# Patient Record
Sex: FEMALE | Race: OTHER | Employment: UNEMPLOYED | URBAN - METROPOLITAN AREA
[De-identification: names, ages, dates, MRNs, and addresses within clinical notes are randomized per-mention and may not be internally consistent; named-entity substitution may affect disease eponyms.]

---

## 2018-02-27 ENCOUNTER — HOSPITAL ENCOUNTER (EMERGENCY)
Age: 83
Discharge: OTHER INSTITUTION | End: 2018-02-27
Attending: EMERGENCY MEDICINE
Payer: MEDICARE

## 2018-02-27 ENCOUNTER — APPOINTMENT (OUTPATIENT)
Dept: GENERAL RADIOLOGY | Age: 83
End: 2018-02-27
Payer: MEDICARE

## 2018-02-27 ENCOUNTER — APPOINTMENT (OUTPATIENT)
Dept: CT IMAGING | Age: 83
End: 2018-02-27
Payer: MEDICARE

## 2018-02-27 VITALS
OXYGEN SATURATION: 96 % | HEART RATE: 107 BPM | DIASTOLIC BLOOD PRESSURE: 54 MMHG | RESPIRATION RATE: 18 BRPM | TEMPERATURE: 98 F | BODY MASS INDEX: 30.04 KG/M2 | SYSTOLIC BLOOD PRESSURE: 113 MMHG | WEIGHT: 159 LBS

## 2018-02-27 DIAGNOSIS — F41.1 ANXIETY STATE: Primary | ICD-10-CM

## 2018-02-27 DIAGNOSIS — F39 MOOD DISORDER (HCC): ICD-10-CM

## 2018-02-27 DIAGNOSIS — F99 PSYCHIATRIC DISORDER: ICD-10-CM

## 2018-02-27 LAB
ALBUMIN SERPL-MCNC: 4 G/DL (ref 3.9–4.9)
ALP BLD-CCNC: 71 U/L (ref 40–130)
ALT SERPL-CCNC: 20 U/L (ref 0–33)
AMPHETAMINE SCREEN, URINE: NORMAL
ANION GAP SERPL CALCULATED.3IONS-SCNC: 12 MEQ/L (ref 7–13)
APTT: 33.8 SEC (ref 21.6–35.4)
AST SERPL-CCNC: 37 U/L (ref 0–35)
BARBITURATE SCREEN URINE: NORMAL
BASOPHILS ABSOLUTE: 0.1 K/UL (ref 0–0.2)
BASOPHILS RELATIVE PERCENT: 1 %
BENZODIAZEPINE SCREEN, URINE: NORMAL
BILIRUB SERPL-MCNC: 0.5 MG/DL (ref 0–1.2)
BILIRUBIN URINE: ABNORMAL
BLOOD, URINE: ABNORMAL
BUN BLDV-MCNC: 19 MG/DL (ref 8–23)
CALCIUM SERPL-MCNC: 9.1 MG/DL (ref 8.6–10.2)
CANNABINOID SCREEN URINE: NORMAL
CHLORIDE BLD-SCNC: 101 MEQ/L (ref 98–107)
CLARITY: ABNORMAL
CO2: 27 MEQ/L (ref 22–29)
COCAINE METABOLITE SCREEN URINE: NORMAL
COLOR: ABNORMAL
CREAT SERPL-MCNC: 1 MG/DL (ref 0.5–0.9)
CRYSTALS, UA: ABNORMAL
EKG ATRIAL RATE: 133 BPM
EKG Q-T INTERVAL: 368 MS
EKG QRS DURATION: 100 MS
EKG QTC CALCULATION (BAZETT): 440 MS
EKG R AXIS: 6 DEGREES
EKG T AXIS: 97 DEGREES
EKG VENTRICULAR RATE: 86 BPM
EOSINOPHILS ABSOLUTE: 0.1 K/UL (ref 0–0.7)
EOSINOPHILS RELATIVE PERCENT: 1.3 %
EPITHELIAL CELLS, UA: ABNORMAL /HPF
ETHANOL PERCENT: NORMAL G/DL
ETHANOL: <10 MG/DL (ref 0–0.08)
GFR AFRICAN AMERICAN: >60
GFR NON-AFRICAN AMERICAN: 52.8
GLOBULIN: 2.5 G/DL (ref 2.3–3.5)
GLUCOSE BLD-MCNC: 178 MG/DL (ref 74–109)
GLUCOSE URINE: NEGATIVE MG/DL
HCT VFR BLD CALC: 42.1 % (ref 37–47)
HEMOGLOBIN: 13.7 G/DL (ref 12–16)
INR BLD: 2.4
KETONES, URINE: NEGATIVE MG/DL
LEUKOCYTE ESTERASE, URINE: ABNORMAL
LYMPHOCYTES ABSOLUTE: 0.8 K/UL (ref 1–4.8)
LYMPHOCYTES RELATIVE PERCENT: 12.3 %
Lab: NORMAL
MAGNESIUM: 2.2 MG/DL (ref 1.7–2.3)
MCH RBC QN AUTO: 29.1 PG (ref 27–31.3)
MCHC RBC AUTO-ENTMCNC: 32.6 % (ref 33–37)
MCV RBC AUTO: 89.2 FL (ref 82–100)
MONOCYTES ABSOLUTE: 0.5 K/UL (ref 0.2–0.8)
MONOCYTES RELATIVE PERCENT: 7.9 %
NEUTROPHILS ABSOLUTE: 5 K/UL (ref 1.4–6.5)
NEUTROPHILS RELATIVE PERCENT: 77.5 %
NITRITE, URINE: NEGATIVE
OPIATE SCREEN URINE: NORMAL
PDW BLD-RTO: 13.3 % (ref 11.5–14.5)
PH UA: 5.5 (ref 5–9)
PHENCYCLIDINE SCREEN URINE: NORMAL
PLATELET # BLD: 161 K/UL (ref 130–400)
POTASSIUM SERPL-SCNC: 4.6 MEQ/L (ref 3.5–5.1)
PROTEIN UA: ABNORMAL MG/DL
PROTHROMBIN TIME: 25.5 SEC (ref 8.1–13.7)
RBC # BLD: 4.72 M/UL (ref 4.2–5.4)
RBC UA: ABNORMAL /HPF (ref 0–2)
RENAL EPITHELIAL, UA: ABNORMAL /HPF
SODIUM BLD-SCNC: 140 MEQ/L (ref 132–144)
SPECIFIC GRAVITY UA: 1.02 (ref 1–1.03)
TOTAL PROTEIN: 6.5 G/DL (ref 6.4–8.1)
TROPONIN: <0.01 NG/ML (ref 0–0.01)
UROBILINOGEN, URINE: 0.2 E.U./DL
WBC # BLD: 6.5 K/UL (ref 4.8–10.8)
WBC UA: ABNORMAL /HPF (ref 0–5)

## 2018-02-27 PROCEDURE — 84484 ASSAY OF TROPONIN QUANT: CPT

## 2018-02-27 PROCEDURE — 80307 DRUG TEST PRSMV CHEM ANLYZR: CPT

## 2018-02-27 PROCEDURE — 6360000002 HC RX W HCPCS: Performed by: EMERGENCY MEDICINE

## 2018-02-27 PROCEDURE — G0480 DRUG TEST DEF 1-7 CLASSES: HCPCS

## 2018-02-27 PROCEDURE — 96374 THER/PROPH/DIAG INJ IV PUSH: CPT

## 2018-02-27 PROCEDURE — 85610 PROTHROMBIN TIME: CPT

## 2018-02-27 PROCEDURE — 83735 ASSAY OF MAGNESIUM: CPT

## 2018-02-27 PROCEDURE — 93010 ELECTROCARDIOGRAM REPORT: CPT | Performed by: INTERNAL MEDICINE

## 2018-02-27 PROCEDURE — 80053 COMPREHEN METABOLIC PANEL: CPT

## 2018-02-27 PROCEDURE — 85025 COMPLETE CBC W/AUTO DIFF WBC: CPT

## 2018-02-27 PROCEDURE — 93005 ELECTROCARDIOGRAM TRACING: CPT

## 2018-02-27 PROCEDURE — 72125 CT NECK SPINE W/O DYE: CPT

## 2018-02-27 PROCEDURE — 72110 X-RAY EXAM L-2 SPINE 4/>VWS: CPT

## 2018-02-27 PROCEDURE — 81001 URINALYSIS AUTO W/SCOPE: CPT

## 2018-02-27 PROCEDURE — 99285 EMERGENCY DEPT VISIT HI MDM: CPT

## 2018-02-27 PROCEDURE — 70450 CT HEAD/BRAIN W/O DYE: CPT

## 2018-02-27 PROCEDURE — 85730 THROMBOPLASTIN TIME PARTIAL: CPT

## 2018-02-27 PROCEDURE — 2580000003 HC RX 258: Performed by: EMERGENCY MEDICINE

## 2018-02-27 PROCEDURE — 36415 COLL VENOUS BLD VENIPUNCTURE: CPT

## 2018-02-27 RX ORDER — LORAZEPAM 2 MG/ML
1 INJECTION INTRAMUSCULAR ONCE
Status: COMPLETED | OUTPATIENT
Start: 2018-02-27 | End: 2018-02-27

## 2018-02-27 RX ORDER — 0.9 % SODIUM CHLORIDE 0.9 %
1000 INTRAVENOUS SOLUTION INTRAVENOUS ONCE
Status: COMPLETED | OUTPATIENT
Start: 2018-02-27 | End: 2018-02-27

## 2018-02-27 RX ADMIN — SODIUM CHLORIDE 1000 ML: 9 INJECTION, SOLUTION INTRAVENOUS at 08:21

## 2018-02-27 RX ADMIN — LORAZEPAM 1 MG: 2 INJECTION INTRAMUSCULAR; INTRAVENOUS at 14:30

## 2018-02-27 ASSESSMENT — ENCOUNTER SYMPTOMS
BACK PAIN: 1
ABDOMINAL PAIN: 0
COUGH: 0
VOMITING: 0
SORE THROAT: 0
DIARRHEA: 0
NAUSEA: 0
SHORTNESS OF BREATH: 0

## 2018-02-27 ASSESSMENT — PAIN SCALES - GENERAL: PAINLEVEL_OUTOF10: 6

## 2018-02-27 ASSESSMENT — PAIN DESCRIPTION - PAIN TYPE: TYPE: ACUTE PAIN

## 2018-02-27 ASSESSMENT — PAIN DESCRIPTION - DESCRIPTORS: DESCRIPTORS: ACHING

## 2018-02-27 ASSESSMENT — PAIN DESCRIPTION - FREQUENCY: FREQUENCY: CONTINUOUS

## 2018-02-27 ASSESSMENT — PAIN DESCRIPTION - LOCATION: LOCATION: ABDOMEN

## 2018-02-27 ASSESSMENT — PAIN DESCRIPTION - ORIENTATION: ORIENTATION: UPPER

## 2018-02-27 NOTE — ED PROVIDER NOTES
(AUGMENTIN) 875-125 MG PER TABLET    TK 1 T PO BID    CEPHALEXIN (KEFLEX) 500 MG CAPSULE    TK ONE C PO   BID FOR 7 DAYS    CHOLECALCIFEROL (VITAMIN D) 2000 UNITS CAPS CAPSULE    Take 1 capsule by mouth daily    CLOBETASOL (TEMOVATE) 0.05 % OINTMENT    Apply prn. CLOTRIMAZOLE-BETAMETHASONE (LOTRISONE) 1-0.05 % CREAM    Apply topically 2 times daily. DONEPEZIL (ARICEPT) 5 MG TABLET    Take 1 tablet by mouth nightly    DULOXETINE (CYMBALTA) 30 MG EXTENDED RELEASE CAPSULE    Take 30 mg by mouth 2 times daily Indications: Duloxetine DR    ESCITALOPRAM (LEXAPRO) 20 MG TABLET    TAKE 1 TABLET BY MOUTH DAILY    FAMOTIDINE (PEPCID) 20 MG TABLET    TAKE 1 TABLET BY MOUTH TWICE DAILY    LIDOCAINE (LIDODERM) 5 %    Place 1 patch onto the skin daily 12 hours on, 12 hours off. METOPROLOL (TOPROL-XL) 50 MG XL TABLET    Take 1 1/2 tabs daily    NUEDEXTA 20-10 MG CAPS        PRIMIDONE (MYSOLINE) 50 MG TABLET    Take 3 tablets by mouth daily. SULFAMETHOXAZOLE-TRIMETHOPRIM (BACTRIM DS;SEPTRA DS) 800-160 MG PER TABLET    TK 1 T PO  BID    TIZANIDINE (ZANAFLEX) 4 MG TABLET    Take 0.5-1 tablets by mouth nightly    WARFARIN (COUMADIN) 6 MG TABLET    TAKE AS DIRECTED TO MAINTAIN INR       ALLERGIES     Codeine    FAMILY HISTORY     History reviewed. No pertinent family history. SOCIAL HISTORY       Social History     Social History    Marital status:      Spouse name: N/A    Number of children: N/A    Years of education: N/A     Social History Main Topics    Smoking status: Never Smoker    Smokeless tobacco: None    Alcohol use No    Drug use: No    Sexual activity: Not Asked     Other Topics Concern    None     Social History Narrative    None         PHYSICAL EXAM       ED Triage Vitals [02/27/18 0707]   BP Temp Temp Source Pulse Resp SpO2 Height Weight   (!) 138/91 97.2 °F (36.2 °C) Oral 94 21 97 % -- 159 lb (72.1 kg)       Physical Exam   Constitutional: She is oriented to person, place, and time.
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
## 2018-04-02 ENCOUNTER — HOSPITAL ENCOUNTER (INPATIENT)
Age: 83
LOS: 5 days | Discharge: HOME | DRG: 193 | End: 2018-04-08
Attending: EMERGENCY MEDICINE | Admitting: HOSPITALIST
Payer: MEDICARE

## 2018-04-02 ENCOUNTER — APPOINTMENT (OUTPATIENT)
Dept: GENERAL RADIOLOGY | Age: 83
DRG: 193 | End: 2018-04-02
Payer: MEDICARE

## 2018-04-02 DIAGNOSIS — J98.01 BRONCHOSPASM: ICD-10-CM

## 2018-04-02 DIAGNOSIS — J10.1 INFLUENZA B: Primary | ICD-10-CM

## 2018-04-02 DIAGNOSIS — E87.20 LACTIC ACIDOSIS: ICD-10-CM

## 2018-04-02 DIAGNOSIS — R77.8 ELEVATED TROPONIN: ICD-10-CM

## 2018-04-02 LAB
ALBUMIN SERPL-MCNC: 3.2 G/DL (ref 3.9–4.9)
ALP BLD-CCNC: 52 U/L (ref 40–130)
ALT SERPL-CCNC: 7 U/L (ref 0–33)
ANION GAP SERPL CALCULATED.3IONS-SCNC: 13 MEQ/L (ref 7–13)
AST SERPL-CCNC: 27 U/L (ref 0–35)
BASOPHILS ABSOLUTE: 0 K/UL (ref 0–0.2)
BASOPHILS RELATIVE PERCENT: 0.6 %
BILIRUB SERPL-MCNC: 0.4 MG/DL (ref 0–1.2)
BUN BLDV-MCNC: 27 MG/DL (ref 8–23)
CALCIUM SERPL-MCNC: 8.2 MG/DL (ref 8.6–10.2)
CHLORIDE BLD-SCNC: 100 MEQ/L (ref 98–107)
CO2: 28 MEQ/L (ref 22–29)
CREAT SERPL-MCNC: 0.93 MG/DL (ref 0.5–0.9)
EOSINOPHILS ABSOLUTE: 0 K/UL (ref 0–0.7)
EOSINOPHILS RELATIVE PERCENT: 0 %
GFR AFRICAN AMERICAN: >60
GFR NON-AFRICAN AMERICAN: 57.4
GLOBULIN: 2.1 G/DL (ref 2.3–3.5)
GLUCOSE BLD-MCNC: 187 MG/DL (ref 74–109)
HCT VFR BLD CALC: 41 % (ref 37–47)
HEMOGLOBIN: 13.4 G/DL (ref 12–16)
INR BLD: 2.5
LACTIC ACID: 3.1 MMOL/L (ref 0.5–2.2)
LYMPHOCYTES ABSOLUTE: 1.1 K/UL (ref 1–4.8)
LYMPHOCYTES RELATIVE PERCENT: 14 %
MCH RBC QN AUTO: 29 PG (ref 27–31.3)
MCHC RBC AUTO-ENTMCNC: 32.7 % (ref 33–37)
MCV RBC AUTO: 88.8 FL (ref 82–100)
MONOCYTES ABSOLUTE: 1 K/UL (ref 0.2–0.8)
MONOCYTES RELATIVE PERCENT: 12.9 %
NEUTROPHILS ABSOLUTE: 5.7 K/UL (ref 1.4–6.5)
NEUTROPHILS RELATIVE PERCENT: 72.5 %
PDW BLD-RTO: 14.6 % (ref 11.5–14.5)
PLATELET # BLD: 79 K/UL (ref 130–400)
PLATELET SLIDE REVIEW: ABNORMAL
POTASSIUM SERPL-SCNC: 3.5 MEQ/L (ref 3.5–5.1)
PRO-BNP: 3576 PG/ML
PROTHROMBIN TIME: 26.2 SEC (ref 8.1–13.7)
RAPID INFLUENZA  B AGN: POSITIVE
RAPID INFLUENZA A AGN: NEGATIVE
RBC # BLD: 4.62 M/UL (ref 4.2–5.4)
SLIDE REVIEW: ABNORMAL
SODIUM BLD-SCNC: 141 MEQ/L (ref 132–144)
TOTAL PROTEIN: 5.3 G/DL (ref 6.4–8.1)
TROPONIN: 0.04 NG/ML (ref 0–0.01)
WBC # BLD: 7.9 K/UL (ref 4.8–10.8)

## 2018-04-02 PROCEDURE — 85025 COMPLETE CBC W/AUTO DIFF WBC: CPT

## 2018-04-02 PROCEDURE — 6370000000 HC RX 637 (ALT 250 FOR IP): Performed by: EMERGENCY MEDICINE

## 2018-04-02 PROCEDURE — 93005 ELECTROCARDIOGRAM TRACING: CPT

## 2018-04-02 PROCEDURE — 71046 X-RAY EXAM CHEST 2 VIEWS: CPT

## 2018-04-02 PROCEDURE — G0378 HOSPITAL OBSERVATION PER HR: HCPCS

## 2018-04-02 PROCEDURE — 83880 ASSAY OF NATRIURETIC PEPTIDE: CPT

## 2018-04-02 PROCEDURE — 94640 AIRWAY INHALATION TREATMENT: CPT

## 2018-04-02 PROCEDURE — 85610 PROTHROMBIN TIME: CPT

## 2018-04-02 PROCEDURE — 96374 THER/PROPH/DIAG INJ IV PUSH: CPT

## 2018-04-02 PROCEDURE — 80053 COMPREHEN METABOLIC PANEL: CPT

## 2018-04-02 PROCEDURE — 99285 EMERGENCY DEPT VISIT HI MDM: CPT

## 2018-04-02 PROCEDURE — 84484 ASSAY OF TROPONIN QUANT: CPT

## 2018-04-02 PROCEDURE — 6360000002 HC RX W HCPCS: Performed by: EMERGENCY MEDICINE

## 2018-04-02 PROCEDURE — 2580000003 HC RX 258: Performed by: EMERGENCY MEDICINE

## 2018-04-02 PROCEDURE — 6370000000 HC RX 637 (ALT 250 FOR IP): Performed by: INTERNAL MEDICINE

## 2018-04-02 PROCEDURE — 36415 COLL VENOUS BLD VENIPUNCTURE: CPT

## 2018-04-02 PROCEDURE — 86403 PARTICLE AGGLUT ANTBDY SCRN: CPT

## 2018-04-02 PROCEDURE — 87040 BLOOD CULTURE FOR BACTERIA: CPT

## 2018-04-02 PROCEDURE — 83605 ASSAY OF LACTIC ACID: CPT

## 2018-04-02 RX ORDER — OSELTAMIVIR PHOSPHATE 75 MG/1
75 CAPSULE ORAL 2 TIMES DAILY
Status: DISCONTINUED | OUTPATIENT
Start: 2018-04-02 | End: 2018-04-07

## 2018-04-02 RX ORDER — BENZONATATE 100 MG/1
100 CAPSULE ORAL 3 TIMES DAILY PRN
Status: DISCONTINUED | OUTPATIENT
Start: 2018-04-02 | End: 2018-04-08 | Stop reason: HOSPADM

## 2018-04-02 RX ORDER — METHYLPREDNISOLONE SODIUM SUCCINATE 40 MG/ML
20 INJECTION, POWDER, LYOPHILIZED, FOR SOLUTION INTRAMUSCULAR; INTRAVENOUS EVERY 8 HOURS
Status: DISCONTINUED | OUTPATIENT
Start: 2018-04-02 | End: 2018-04-02

## 2018-04-02 RX ORDER — LIDOCAINE 50 MG/G
1 PATCH TOPICAL DAILY
Status: DISCONTINUED | OUTPATIENT
Start: 2018-04-02 | End: 2018-04-08 | Stop reason: HOSPADM

## 2018-04-02 RX ORDER — ONDANSETRON 2 MG/ML
4 INJECTION INTRAMUSCULAR; INTRAVENOUS EVERY 6 HOURS PRN
Status: DISCONTINUED | OUTPATIENT
Start: 2018-04-02 | End: 2018-04-08 | Stop reason: HOSPADM

## 2018-04-02 RX ORDER — SODIUM CHLORIDE 9 MG/ML
INJECTION, SOLUTION INTRAVENOUS CONTINUOUS
Status: DISCONTINUED | OUTPATIENT
Start: 2018-04-02 | End: 2018-04-03

## 2018-04-02 RX ORDER — 0.9 % SODIUM CHLORIDE 0.9 %
500 INTRAVENOUS SOLUTION INTRAVENOUS ONCE
Status: COMPLETED | OUTPATIENT
Start: 2018-04-02 | End: 2018-04-02

## 2018-04-02 RX ORDER — BUSPIRONE HYDROCHLORIDE 10 MG/1
10 TABLET ORAL 3 TIMES DAILY
COMMUNITY
End: 2018-09-09

## 2018-04-02 RX ORDER — ESCITALOPRAM OXALATE 20 MG/1
20 TABLET ORAL DAILY
Status: DISCONTINUED | OUTPATIENT
Start: 2018-04-02 | End: 2018-04-03

## 2018-04-02 RX ORDER — PALIPERIDONE 3 MG/1
3 TABLET, EXTENDED RELEASE ORAL EVERY MORNING
Status: ON HOLD | COMMUNITY
End: 2018-04-08 | Stop reason: HOSPADM

## 2018-04-02 RX ORDER — PALIPERIDONE 3 MG/1
3 TABLET, EXTENDED RELEASE ORAL EVERY MORNING
Status: DISCONTINUED | OUTPATIENT
Start: 2018-04-03 | End: 2018-04-03

## 2018-04-02 RX ORDER — IPRATROPIUM BROMIDE AND ALBUTEROL SULFATE 2.5; .5 MG/3ML; MG/3ML
1 SOLUTION RESPIRATORY (INHALATION) 3 TIMES DAILY
Status: DISCONTINUED | OUTPATIENT
Start: 2018-04-02 | End: 2018-04-02

## 2018-04-02 RX ORDER — HALOPERIDOL 5 MG/ML
2 INJECTION INTRAMUSCULAR EVERY 4 HOURS PRN
Status: DISCONTINUED | OUTPATIENT
Start: 2018-04-02 | End: 2018-04-08 | Stop reason: HOSPADM

## 2018-04-02 RX ORDER — DIVALPROEX SODIUM 125 MG/1
375 CAPSULE, COATED PELLETS ORAL 2 TIMES DAILY
Status: ON HOLD | COMMUNITY
End: 2018-04-08 | Stop reason: HOSPADM

## 2018-04-02 RX ORDER — DIVALPROEX SODIUM 125 MG/1
375 CAPSULE, COATED PELLETS ORAL 2 TIMES DAILY
Status: DISCONTINUED | OUTPATIENT
Start: 2018-04-02 | End: 2018-04-03

## 2018-04-02 RX ORDER — AMANTADINE HYDROCHLORIDE 100 MG/1
100 CAPSULE, GELATIN COATED ORAL DAILY
Status: DISCONTINUED | OUTPATIENT
Start: 2018-04-02 | End: 2018-04-03

## 2018-04-02 RX ORDER — METHYLPREDNISOLONE SODIUM SUCCINATE 125 MG/2ML
125 INJECTION, POWDER, LYOPHILIZED, FOR SOLUTION INTRAMUSCULAR; INTRAVENOUS ONCE
Status: COMPLETED | OUTPATIENT
Start: 2018-04-02 | End: 2018-04-02

## 2018-04-02 RX ORDER — ALBUTEROL SULFATE 2.5 MG/3ML
2.5 SOLUTION RESPIRATORY (INHALATION)
Status: DISCONTINUED | OUTPATIENT
Start: 2018-04-02 | End: 2018-04-02

## 2018-04-02 RX ORDER — IPRATROPIUM BROMIDE AND ALBUTEROL SULFATE 2.5; .5 MG/3ML; MG/3ML
1 SOLUTION RESPIRATORY (INHALATION) PRN
Status: DISCONTINUED | OUTPATIENT
Start: 2018-04-02 | End: 2018-04-02

## 2018-04-02 RX ORDER — ALBUTEROL SULFATE 2.5 MG/3ML
2.5 SOLUTION RESPIRATORY (INHALATION) EVERY 4 HOURS PRN
Status: DISCONTINUED | OUTPATIENT
Start: 2018-04-02 | End: 2018-04-03

## 2018-04-02 RX ORDER — FAMOTIDINE 20 MG/1
20 TABLET, FILM COATED ORAL 2 TIMES DAILY
Status: DISCONTINUED | OUTPATIENT
Start: 2018-04-02 | End: 2018-04-08 | Stop reason: HOSPADM

## 2018-04-02 RX ORDER — TIZANIDINE 4 MG/1
4 TABLET ORAL NIGHTLY PRN
Status: DISCONTINUED | OUTPATIENT
Start: 2018-04-02 | End: 2018-04-08 | Stop reason: HOSPADM

## 2018-04-02 RX ORDER — PRIMIDONE 50 MG/1
150 TABLET ORAL DAILY
Status: DISCONTINUED | OUTPATIENT
Start: 2018-04-02 | End: 2018-04-08 | Stop reason: HOSPADM

## 2018-04-02 RX ORDER — GUAIFENESIN/DEXTROMETHORPHAN 100-10MG/5
5 SYRUP ORAL EVERY 4 HOURS PRN
Status: DISCONTINUED | OUTPATIENT
Start: 2018-04-02 | End: 2018-04-08 | Stop reason: HOSPADM

## 2018-04-02 RX ORDER — FLECAINIDE ACETATE 100 MG/1
100 TABLET ORAL 2 TIMES DAILY
Status: DISCONTINUED | OUTPATIENT
Start: 2018-04-02 | End: 2018-04-08 | Stop reason: HOSPADM

## 2018-04-02 RX ORDER — ALPRAZOLAM 0.25 MG/1
0.25 TABLET ORAL 3 TIMES DAILY PRN
Status: DISCONTINUED | OUTPATIENT
Start: 2018-04-02 | End: 2018-04-08 | Stop reason: HOSPADM

## 2018-04-02 RX ORDER — ACETAMINOPHEN 325 MG/1
650 TABLET ORAL EVERY 4 HOURS PRN
Status: DISCONTINUED | OUTPATIENT
Start: 2018-04-02 | End: 2018-04-08 | Stop reason: HOSPADM

## 2018-04-02 RX ORDER — DONEPEZIL HYDROCHLORIDE 5 MG/1
5 TABLET, FILM COATED ORAL NIGHTLY
Status: DISCONTINUED | OUTPATIENT
Start: 2018-04-02 | End: 2018-04-02

## 2018-04-02 RX ORDER — DULOXETIN HYDROCHLORIDE 30 MG/1
30 CAPSULE, DELAYED RELEASE ORAL 2 TIMES DAILY
Status: DISCONTINUED | OUTPATIENT
Start: 2018-04-02 | End: 2018-04-08 | Stop reason: HOSPADM

## 2018-04-02 RX ORDER — BUSPIRONE HYDROCHLORIDE 10 MG/1
10 TABLET ORAL 3 TIMES DAILY
Status: DISCONTINUED | OUTPATIENT
Start: 2018-04-02 | End: 2018-04-08 | Stop reason: HOSPADM

## 2018-04-02 RX ORDER — DOCUSATE SODIUM 100 MG/1
100 CAPSULE, LIQUID FILLED ORAL 2 TIMES DAILY
Status: DISCONTINUED | OUTPATIENT
Start: 2018-04-02 | End: 2018-04-08 | Stop reason: HOSPADM

## 2018-04-02 RX ORDER — FLECAINIDE ACETATE 100 MG/1
100 TABLET ORAL 2 TIMES DAILY
COMMUNITY
End: 2018-09-09

## 2018-04-02 RX ORDER — IPRATROPIUM BROMIDE AND ALBUTEROL SULFATE 2.5; .5 MG/3ML; MG/3ML
1 SOLUTION RESPIRATORY (INHALATION) 4 TIMES DAILY
Status: DISCONTINUED | OUTPATIENT
Start: 2018-04-02 | End: 2018-04-02

## 2018-04-02 RX ADMIN — ALPRAZOLAM 0.25 MG: 0.25 TABLET ORAL at 19:01

## 2018-04-02 RX ADMIN — SODIUM CHLORIDE 500 ML: 9 INJECTION, SOLUTION INTRAVENOUS at 14:33

## 2018-04-02 RX ADMIN — METHYLPREDNISOLONE SODIUM SUCCINATE 125 MG: 125 INJECTION, POWDER, FOR SOLUTION INTRAMUSCULAR; INTRAVENOUS at 14:33

## 2018-04-02 RX ADMIN — WARFARIN SODIUM 6 MG: 5 TABLET ORAL at 19:01

## 2018-04-02 RX ADMIN — IPRATROPIUM BROMIDE AND ALBUTEROL SULFATE 1 AMPULE: .5; 3 SOLUTION RESPIRATORY (INHALATION) at 13:15

## 2018-04-02 RX ADMIN — SODIUM CHLORIDE: 9 INJECTION, SOLUTION INTRAVENOUS at 22:28

## 2018-04-02 ASSESSMENT — ENCOUNTER SYMPTOMS
SORE THROAT: 0
VOMITING: 0
SHORTNESS OF BREATH: 1
NAUSEA: 0
WHEEZING: 1
ABDOMINAL PAIN: 0
EYE PAIN: 0
COUGH: 1
CHEST TIGHTNESS: 1

## 2018-04-03 PROBLEM — R77.8 ELEVATED TROPONIN: Status: ACTIVE | Noted: 2018-04-03

## 2018-04-03 LAB
CK MB: 2.4 NG/ML (ref 0–3.8)
CK MB: 2.7 NG/ML (ref 0–3.8)
CREATINE KINASE-MB INDEX: 3.2 % (ref 0–3.5)
CREATINE KINASE-MB INDEX: 3.9 % (ref 0–3.5)
INR BLD: 2.8
LACTIC ACID: 2.9 MMOL/L (ref 0.5–2.2)
PROTHROMBIN TIME: 30 SEC (ref 8.1–13.7)
TOTAL CK: 70 U/L (ref 0–170)
TOTAL CK: 75 U/L (ref 0–170)
TROPONIN: <0.01 NG/ML (ref 0–0.01)
TROPONIN: <0.01 NG/ML (ref 0–0.01)

## 2018-04-03 PROCEDURE — 1210000000 HC MED SURG R&B

## 2018-04-03 PROCEDURE — 6370000000 HC RX 637 (ALT 250 FOR IP): Performed by: INTERNAL MEDICINE

## 2018-04-03 PROCEDURE — 82550 ASSAY OF CK (CPK): CPT

## 2018-04-03 PROCEDURE — 2580000003 HC RX 258: Performed by: EMERGENCY MEDICINE

## 2018-04-03 PROCEDURE — 94640 AIRWAY INHALATION TREATMENT: CPT

## 2018-04-03 PROCEDURE — 93005 ELECTROCARDIOGRAM TRACING: CPT

## 2018-04-03 PROCEDURE — 6370000000 HC RX 637 (ALT 250 FOR IP): Performed by: CLINICAL NURSE SPECIALIST

## 2018-04-03 PROCEDURE — 6370000000 HC RX 637 (ALT 250 FOR IP): Performed by: PSYCHIATRY & NEUROLOGY

## 2018-04-03 PROCEDURE — 99222 1ST HOSP IP/OBS MODERATE 55: CPT | Performed by: INTERNAL MEDICINE

## 2018-04-03 PROCEDURE — 83605 ASSAY OF LACTIC ACID: CPT

## 2018-04-03 PROCEDURE — 82553 CREATINE MB FRACTION: CPT

## 2018-04-03 PROCEDURE — 97162 PT EVAL MOD COMPLEX 30 MIN: CPT

## 2018-04-03 PROCEDURE — 36415 COLL VENOUS BLD VENIPUNCTURE: CPT

## 2018-04-03 PROCEDURE — 84484 ASSAY OF TROPONIN QUANT: CPT

## 2018-04-03 PROCEDURE — G8979 MOBILITY GOAL STATUS: HCPCS

## 2018-04-03 PROCEDURE — 99222 1ST HOSP IP/OBS MODERATE 55: CPT | Performed by: CLINICAL NURSE SPECIALIST

## 2018-04-03 PROCEDURE — 6360000002 HC RX W HCPCS: Performed by: INTERNAL MEDICINE

## 2018-04-03 PROCEDURE — 93010 ELECTROCARDIOGRAM REPORT: CPT | Performed by: INTERNAL MEDICINE

## 2018-04-03 PROCEDURE — G8978 MOBILITY CURRENT STATUS: HCPCS

## 2018-04-03 PROCEDURE — 85610 PROTHROMBIN TIME: CPT

## 2018-04-03 RX ORDER — ALBUTEROL SULFATE 2.5 MG/3ML
2.5 SOLUTION RESPIRATORY (INHALATION)
Status: DISCONTINUED | OUTPATIENT
Start: 2018-04-03 | End: 2018-04-08

## 2018-04-03 RX ORDER — MEMANTINE HYDROCHLORIDE 5 MG/1
5 TABLET ORAL 2 TIMES DAILY
Status: DISCONTINUED | OUTPATIENT
Start: 2018-04-03 | End: 2018-04-08 | Stop reason: HOSPADM

## 2018-04-03 RX ORDER — ALBUTEROL SULFATE 2.5 MG/3ML
2.5 SOLUTION RESPIRATORY (INHALATION) 4 TIMES DAILY
Status: DISCONTINUED | OUTPATIENT
Start: 2018-04-03 | End: 2018-04-06

## 2018-04-03 RX ORDER — QUETIAPINE FUMARATE 25 MG/1
12.5 TABLET, FILM COATED ORAL 3 TIMES DAILY
Status: DISCONTINUED | OUTPATIENT
Start: 2018-04-03 | End: 2018-04-08 | Stop reason: HOSPADM

## 2018-04-03 RX ORDER — DIVALPROEX SODIUM 125 MG/1
500 CAPSULE, COATED PELLETS ORAL EVERY 12 HOURS SCHEDULED
Status: DISCONTINUED | OUTPATIENT
Start: 2018-04-03 | End: 2018-04-03

## 2018-04-03 RX ADMIN — DOCUSATE SODIUM 100 MG: 100 CAPSULE, LIQUID FILLED ORAL at 20:13

## 2018-04-03 RX ADMIN — FAMOTIDINE 20 MG: 20 TABLET ORAL at 08:54

## 2018-04-03 RX ADMIN — ACETAMINOPHEN 650 MG: 325 TABLET ORAL at 09:09

## 2018-04-03 RX ADMIN — PRIMIDONE 150 MG: 50 TABLET ORAL at 08:54

## 2018-04-03 RX ADMIN — QUETIAPINE FUMARATE 12.5 MG: 25 TABLET ORAL at 15:05

## 2018-04-03 RX ADMIN — QUETIAPINE FUMARATE 12.5 MG: 25 TABLET ORAL at 20:13

## 2018-04-03 RX ADMIN — PIMAVANSERIN TARTRATE 34 MG: 17 TABLET, COATED ORAL at 20:15

## 2018-04-03 RX ADMIN — ESCITALOPRAM 20 MG: 20 TABLET, FILM COATED ORAL at 08:53

## 2018-04-03 RX ADMIN — AMANTADINE HYDROCHLORIDE 100 MG: 100 CAPSULE ORAL at 08:54

## 2018-04-03 RX ADMIN — SODIUM CHLORIDE: 9 INJECTION, SOLUTION INTRAVENOUS at 08:52

## 2018-04-03 RX ADMIN — ALBUTEROL SULFATE 2.5 MG: 2.5 SOLUTION RESPIRATORY (INHALATION) at 19:34

## 2018-04-03 RX ADMIN — OSELTAMIVIR PHOSPHATE 75 MG: 75 CAPSULE ORAL at 08:52

## 2018-04-03 RX ADMIN — BENZONATATE 100 MG: 100 CAPSULE ORAL at 17:36

## 2018-04-03 RX ADMIN — ACETAMINOPHEN 650 MG: 325 TABLET ORAL at 17:36

## 2018-04-03 RX ADMIN — DIVALPROEX SODIUM 375 MG: 125 CAPSULE, COATED PELLETS ORAL at 08:53

## 2018-04-03 RX ADMIN — ALBUTEROL SULFATE 2.5 MG: 2.5 SOLUTION RESPIRATORY (INHALATION) at 16:08

## 2018-04-03 RX ADMIN — BUSPIRONE HYDROCHLORIDE 10 MG: 10 TABLET ORAL at 08:53

## 2018-04-03 RX ADMIN — VITAMIN D, TAB 1000IU (100/BT) 2000 UNITS: 25 TAB at 08:52

## 2018-04-03 RX ADMIN — OSELTAMIVIR PHOSPHATE 75 MG: 75 CAPSULE ORAL at 20:13

## 2018-04-03 RX ADMIN — MEMANTINE 5 MG: 5 TABLET ORAL at 20:13

## 2018-04-03 RX ADMIN — FLECAINIDE ACETATE 100 MG: 100 TABLET ORAL at 08:54

## 2018-04-03 RX ADMIN — BUSPIRONE HYDROCHLORIDE 10 MG: 10 TABLET ORAL at 20:13

## 2018-04-03 RX ADMIN — CARBIDOPA AND LEVODOPA 1 TABLET: 10; 100 TABLET ORAL at 08:55

## 2018-04-03 RX ADMIN — DULOXETINE HYDROCHLORIDE 30 MG: 30 CAPSULE, DELAYED RELEASE ORAL at 20:13

## 2018-04-03 RX ADMIN — BENZONATATE 100 MG: 100 CAPSULE ORAL at 09:09

## 2018-04-03 RX ADMIN — FAMOTIDINE 20 MG: 20 TABLET ORAL at 20:13

## 2018-04-03 RX ADMIN — ALPRAZOLAM 0.25 MG: 0.25 TABLET ORAL at 17:56

## 2018-04-03 RX ADMIN — MEMANTINE 5 MG: 5 TABLET ORAL at 15:05

## 2018-04-03 RX ADMIN — DOCUSATE SODIUM 100 MG: 100 CAPSULE, LIQUID FILLED ORAL at 08:52

## 2018-04-03 RX ADMIN — WARFARIN SODIUM 6 MG: 5 TABLET ORAL at 17:37

## 2018-04-03 RX ADMIN — DULOXETINE HYDROCHLORIDE 30 MG: 30 CAPSULE, DELAYED RELEASE ORAL at 08:54

## 2018-04-03 RX ADMIN — METOPROLOL SUCCINATE 75 MG: 50 TABLET, FILM COATED, EXTENDED RELEASE ORAL at 08:47

## 2018-04-03 RX ADMIN — BUSPIRONE HYDROCHLORIDE 10 MG: 10 TABLET ORAL at 17:37

## 2018-04-03 RX ADMIN — FLECAINIDE ACETATE 100 MG: 100 TABLET ORAL at 20:13

## 2018-04-03 ASSESSMENT — ENCOUNTER SYMPTOMS
CHEST TIGHTNESS: 0
COLOR CHANGE: 0
ABDOMINAL DISTENTION: 0
BACK PAIN: 0
ABDOMINAL PAIN: 0
APNEA: 0
CHOKING: 0
SHORTNESS OF BREATH: 1
COUGH: 1

## 2018-04-03 ASSESSMENT — PAIN SCALES - GENERAL
PAINLEVEL_OUTOF10: 5
PAINLEVEL_OUTOF10: 0
PAINLEVEL_OUTOF10: 5
PAINLEVEL_OUTOF10: 4
PAINLEVEL_OUTOF10: 0
PAINLEVEL_OUTOF10: 0
PAINLEVEL_OUTOF10: 4
PAINLEVEL_OUTOF10: 0

## 2018-04-03 ASSESSMENT — PAIN DESCRIPTION - ORIENTATION: ORIENTATION: RIGHT

## 2018-04-03 ASSESSMENT — PAIN - FUNCTIONAL ASSESSMENT: PAIN_FUNCTIONAL_ASSESSMENT: 0-10

## 2018-04-03 ASSESSMENT — PAIN DESCRIPTION - PAIN TYPE
TYPE: ACUTE PAIN
TYPE: ACUTE PAIN

## 2018-04-03 ASSESSMENT — PAIN DESCRIPTION - LOCATION
LOCATION: EAR
LOCATION: HEAD;THROAT

## 2018-04-04 LAB
CK MB: 2 NG/ML (ref 0–3.8)
CREATINE KINASE-MB INDEX: 5.1 % (ref 0–3.5)
INR BLD: 5.6
PROTHROMBIN TIME: 60.2 SEC (ref 8.1–13.7)
TOTAL CK: 39 U/L (ref 0–170)
TROPONIN: 0.02 NG/ML (ref 0–0.01)

## 2018-04-04 PROCEDURE — 6360000002 HC RX W HCPCS: Performed by: INTERNAL MEDICINE

## 2018-04-04 PROCEDURE — 6370000000 HC RX 637 (ALT 250 FOR IP): Performed by: INTERNAL MEDICINE

## 2018-04-04 PROCEDURE — 99232 SBSQ HOSP IP/OBS MODERATE 35: CPT | Performed by: INTERNAL MEDICINE

## 2018-04-04 PROCEDURE — 1210000000 HC MED SURG R&B

## 2018-04-04 PROCEDURE — 94640 AIRWAY INHALATION TREATMENT: CPT

## 2018-04-04 PROCEDURE — 82553 CREATINE MB FRACTION: CPT

## 2018-04-04 PROCEDURE — 93005 ELECTROCARDIOGRAM TRACING: CPT

## 2018-04-04 PROCEDURE — 84484 ASSAY OF TROPONIN QUANT: CPT

## 2018-04-04 PROCEDURE — 94760 N-INVAS EAR/PLS OXIMETRY 1: CPT

## 2018-04-04 PROCEDURE — 36415 COLL VENOUS BLD VENIPUNCTURE: CPT

## 2018-04-04 PROCEDURE — 6370000000 HC RX 637 (ALT 250 FOR IP): Performed by: PSYCHIATRY & NEUROLOGY

## 2018-04-04 PROCEDURE — APPSS15 APP SPLIT SHARED TIME 0-15 MINUTES: Performed by: PHYSICIAN ASSISTANT

## 2018-04-04 PROCEDURE — 6370000000 HC RX 637 (ALT 250 FOR IP)

## 2018-04-04 PROCEDURE — 6370000000 HC RX 637 (ALT 250 FOR IP): Performed by: CLINICAL NURSE SPECIALIST

## 2018-04-04 PROCEDURE — 82550 ASSAY OF CK (CPK): CPT

## 2018-04-04 PROCEDURE — 85610 PROTHROMBIN TIME: CPT

## 2018-04-04 RX ORDER — ACETAMINOPHEN 80 MG
TABLET,CHEWABLE ORAL ONCE
Status: DISCONTINUED | OUTPATIENT
Start: 2018-04-04 | End: 2018-04-08 | Stop reason: HOSPADM

## 2018-04-04 RX ORDER — METOPROLOL SUCCINATE 50 MG/1
100 TABLET, EXTENDED RELEASE ORAL DAILY
Status: DISCONTINUED | OUTPATIENT
Start: 2018-04-05 | End: 2018-04-08 | Stop reason: HOSPADM

## 2018-04-04 RX ORDER — METHYLPREDNISOLONE SODIUM SUCCINATE 40 MG/ML
40 INJECTION, POWDER, LYOPHILIZED, FOR SOLUTION INTRAMUSCULAR; INTRAVENOUS EVERY 8 HOURS
Status: DISCONTINUED | OUTPATIENT
Start: 2018-04-04 | End: 2018-04-07

## 2018-04-04 RX ADMIN — VITAMIN D, TAB 1000IU (100/BT) 2000 UNITS: 25 TAB at 10:03

## 2018-04-04 RX ADMIN — QUETIAPINE FUMARATE 12.5 MG: 25 TABLET ORAL at 10:05

## 2018-04-04 RX ADMIN — ALBUTEROL SULFATE 2.5 MG: 2.5 SOLUTION RESPIRATORY (INHALATION) at 08:21

## 2018-04-04 RX ADMIN — BUSPIRONE HYDROCHLORIDE 10 MG: 10 TABLET ORAL at 20:14

## 2018-04-04 RX ADMIN — QUETIAPINE FUMARATE 12.5 MG: 25 TABLET ORAL at 20:15

## 2018-04-04 RX ADMIN — METOPROLOL SUCCINATE 75 MG: 50 TABLET, FILM COATED, EXTENDED RELEASE ORAL at 10:03

## 2018-04-04 RX ADMIN — ALBUTEROL SULFATE 2.5 MG: 2.5 SOLUTION RESPIRATORY (INHALATION) at 12:13

## 2018-04-04 RX ADMIN — DULOXETINE HYDROCHLORIDE 30 MG: 30 CAPSULE, DELAYED RELEASE ORAL at 10:04

## 2018-04-04 RX ADMIN — FAMOTIDINE 20 MG: 20 TABLET ORAL at 20:15

## 2018-04-04 RX ADMIN — DOCUSATE SODIUM 100 MG: 100 CAPSULE, LIQUID FILLED ORAL at 10:02

## 2018-04-04 RX ADMIN — ALBUTEROL SULFATE 2.5 MG: 2.5 SOLUTION RESPIRATORY (INHALATION) at 15:47

## 2018-04-04 RX ADMIN — OSELTAMIVIR PHOSPHATE 75 MG: 75 CAPSULE ORAL at 20:15

## 2018-04-04 RX ADMIN — FLECAINIDE ACETATE 100 MG: 100 TABLET ORAL at 20:15

## 2018-04-04 RX ADMIN — BUSPIRONE HYDROCHLORIDE 10 MG: 10 TABLET ORAL at 10:03

## 2018-04-04 RX ADMIN — FLECAINIDE ACETATE 100 MG: 100 TABLET ORAL at 10:04

## 2018-04-04 RX ADMIN — OSELTAMIVIR PHOSPHATE 75 MG: 75 CAPSULE ORAL at 10:06

## 2018-04-04 RX ADMIN — MEMANTINE 5 MG: 5 TABLET ORAL at 10:03

## 2018-04-04 RX ADMIN — ALBUTEROL SULFATE 2.5 MG: 2.5 SOLUTION RESPIRATORY (INHALATION) at 20:50

## 2018-04-04 RX ADMIN — BUSPIRONE HYDROCHLORIDE 10 MG: 10 TABLET ORAL at 14:48

## 2018-04-04 RX ADMIN — DULOXETINE HYDROCHLORIDE 30 MG: 30 CAPSULE, DELAYED RELEASE ORAL at 20:15

## 2018-04-04 RX ADMIN — PIMAVANSERIN TARTRATE 34 MG: 17 TABLET, COATED ORAL at 20:11

## 2018-04-04 RX ADMIN — METHYLPREDNISOLONE SODIUM SUCCINATE 40 MG: 40 INJECTION, POWDER, FOR SOLUTION INTRAMUSCULAR; INTRAVENOUS at 20:11

## 2018-04-04 RX ADMIN — DOCUSATE SODIUM 100 MG: 100 CAPSULE, LIQUID FILLED ORAL at 20:15

## 2018-04-04 RX ADMIN — FAMOTIDINE 20 MG: 20 TABLET ORAL at 10:04

## 2018-04-04 RX ADMIN — MEMANTINE 5 MG: 5 TABLET ORAL at 20:15

## 2018-04-04 RX ADMIN — PRIMIDONE 150 MG: 50 TABLET ORAL at 10:03

## 2018-04-04 RX ADMIN — QUETIAPINE FUMARATE 12.5 MG: 25 TABLET ORAL at 14:48

## 2018-04-05 ENCOUNTER — APPOINTMENT (OUTPATIENT)
Dept: GENERAL RADIOLOGY | Age: 83
DRG: 193 | End: 2018-04-05
Payer: MEDICARE

## 2018-04-05 LAB
AMORPHOUS: ABNORMAL
BACTERIA: ABNORMAL /HPF
BILIRUBIN URINE: NEGATIVE
BLOOD, URINE: NEGATIVE
C-REACTIVE PROTEIN, HIGH SENSITIVITY: 33.8 MG/L (ref 0–5)
CLARITY: ABNORMAL
COLOR: YELLOW
EPITHELIAL CELLS, UA: ABNORMAL /HPF
GLUCOSE URINE: 500 MG/DL
INR BLD: 4.8
KETONES, URINE: NEGATIVE MG/DL
LEUKOCYTE ESTERASE, URINE: ABNORMAL
NITRITE, URINE: NEGATIVE
PH UA: 6 (ref 5–9)
PROTEIN UA: ABNORMAL MG/DL
PROTHROMBIN TIME: 51.3 SEC (ref 8.1–13.7)
RBC UA: ABNORMAL /HPF (ref 0–2)
SEDIMENTATION RATE, ERYTHROCYTE: 5 MM (ref 0–30)
SPECIFIC GRAVITY UA: 1.03 (ref 1–1.03)
UROBILINOGEN, URINE: 1 E.U./DL
WBC UA: ABNORMAL /HPF (ref 0–5)

## 2018-04-05 PROCEDURE — 6360000002 HC RX W HCPCS: Performed by: HOSPITALIST

## 2018-04-05 PROCEDURE — 85652 RBC SED RATE AUTOMATED: CPT

## 2018-04-05 PROCEDURE — 6360000002 HC RX W HCPCS: Performed by: INTERNAL MEDICINE

## 2018-04-05 PROCEDURE — 99232 SBSQ HOSP IP/OBS MODERATE 35: CPT | Performed by: INTERNAL MEDICINE

## 2018-04-05 PROCEDURE — 6370000000 HC RX 637 (ALT 250 FOR IP): Performed by: NURSE PRACTITIONER

## 2018-04-05 PROCEDURE — 94760 N-INVAS EAR/PLS OXIMETRY 1: CPT

## 2018-04-05 PROCEDURE — 94640 AIRWAY INHALATION TREATMENT: CPT

## 2018-04-05 PROCEDURE — 99232 SBSQ HOSP IP/OBS MODERATE 35: CPT | Performed by: CLINICAL NURSE SPECIALIST

## 2018-04-05 PROCEDURE — 1210000000 HC MED SURG R&B

## 2018-04-05 PROCEDURE — 6370000000 HC RX 637 (ALT 250 FOR IP): Performed by: CLINICAL NURSE SPECIALIST

## 2018-04-05 PROCEDURE — 71045 X-RAY EXAM CHEST 1 VIEW: CPT

## 2018-04-05 PROCEDURE — 93005 ELECTROCARDIOGRAM TRACING: CPT

## 2018-04-05 PROCEDURE — G8997 SWALLOW GOAL STATUS: HCPCS

## 2018-04-05 PROCEDURE — 2580000003 HC RX 258: Performed by: HOSPITALIST

## 2018-04-05 PROCEDURE — 86141 C-REACTIVE PROTEIN HS: CPT

## 2018-04-05 PROCEDURE — 87086 URINE CULTURE/COLONY COUNT: CPT

## 2018-04-05 PROCEDURE — 93010 ELECTROCARDIOGRAM REPORT: CPT | Performed by: INTERNAL MEDICINE

## 2018-04-05 PROCEDURE — 97110 THERAPEUTIC EXERCISES: CPT

## 2018-04-05 PROCEDURE — 36415 COLL VENOUS BLD VENIPUNCTURE: CPT

## 2018-04-05 PROCEDURE — 2580000003 HC RX 258: Performed by: INTERNAL MEDICINE

## 2018-04-05 PROCEDURE — G8996 SWALLOW CURRENT STATUS: HCPCS

## 2018-04-05 PROCEDURE — 6370000000 HC RX 637 (ALT 250 FOR IP): Performed by: PSYCHIATRY & NEUROLOGY

## 2018-04-05 PROCEDURE — 92610 EVALUATE SWALLOWING FUNCTION: CPT

## 2018-04-05 PROCEDURE — 6370000000 HC RX 637 (ALT 250 FOR IP): Performed by: INTERNAL MEDICINE

## 2018-04-05 PROCEDURE — 85610 PROTHROMBIN TIME: CPT

## 2018-04-05 PROCEDURE — 81001 URINALYSIS AUTO W/SCOPE: CPT

## 2018-04-05 RX ORDER — OSELTAMIVIR PHOSPHATE 75 MG/1
75 CAPSULE ORAL 2 TIMES DAILY
Qty: 4 CAPSULE | Refills: 0 | Status: SHIPPED | OUTPATIENT
Start: 2018-04-05 | End: 2018-04-08 | Stop reason: HOSPADM

## 2018-04-05 RX ORDER — METOPROLOL SUCCINATE 100 MG/1
100 TABLET, EXTENDED RELEASE ORAL DAILY
Qty: 30 TABLET | Refills: 3 | Status: SHIPPED | OUTPATIENT
Start: 2018-04-06 | End: 2018-04-08

## 2018-04-05 RX ORDER — PREDNISONE 10 MG/1
TABLET ORAL
Qty: 30 TABLET | Refills: 0 | Status: SHIPPED | OUTPATIENT
Start: 2018-04-05 | End: 2018-04-08 | Stop reason: HOSPADM

## 2018-04-05 RX ORDER — MEMANTINE HYDROCHLORIDE 5 MG/1
5 TABLET ORAL 2 TIMES DAILY
Qty: 60 TABLET | Refills: 1 | Status: SHIPPED | OUTPATIENT
Start: 2018-04-05 | End: 2018-04-08

## 2018-04-05 RX ORDER — SODIUM CHLORIDE 9 MG/ML
INJECTION, SOLUTION INTRAVENOUS CONTINUOUS
Status: DISPENSED | OUTPATIENT
Start: 2018-04-05 | End: 2018-04-05

## 2018-04-05 RX ORDER — QUETIAPINE FUMARATE 25 MG/1
12.5 TABLET, FILM COATED ORAL 3 TIMES DAILY
Qty: 90 TABLET | Refills: 1 | Status: SHIPPED | OUTPATIENT
Start: 2018-04-05 | End: 2018-04-08

## 2018-04-05 RX ADMIN — VITAMIN D, TAB 1000IU (100/BT) 2000 UNITS: 25 TAB at 08:50

## 2018-04-05 RX ADMIN — ALBUTEROL SULFATE 2.5 MG: 2.5 SOLUTION RESPIRATORY (INHALATION) at 20:49

## 2018-04-05 RX ADMIN — FAMOTIDINE 20 MG: 20 TABLET ORAL at 21:50

## 2018-04-05 RX ADMIN — QUETIAPINE FUMARATE 12.5 MG: 25 TABLET ORAL at 15:01

## 2018-04-05 RX ADMIN — METOPROLOL SUCCINATE 100 MG: 50 TABLET, EXTENDED RELEASE ORAL at 08:49

## 2018-04-05 RX ADMIN — FLECAINIDE ACETATE 100 MG: 100 TABLET ORAL at 08:50

## 2018-04-05 RX ADMIN — SODIUM CHLORIDE: 9 INJECTION, SOLUTION INTRAVENOUS at 08:49

## 2018-04-05 RX ADMIN — ALBUTEROL SULFATE 2.5 MG: 2.5 SOLUTION RESPIRATORY (INHALATION) at 07:25

## 2018-04-05 RX ADMIN — PRIMIDONE 150 MG: 50 TABLET ORAL at 08:50

## 2018-04-05 RX ADMIN — OSELTAMIVIR PHOSPHATE 75 MG: 75 CAPSULE ORAL at 21:50

## 2018-04-05 RX ADMIN — BUSPIRONE HYDROCHLORIDE 10 MG: 10 TABLET ORAL at 08:50

## 2018-04-05 RX ADMIN — DULOXETINE HYDROCHLORIDE 30 MG: 30 CAPSULE, DELAYED RELEASE ORAL at 08:49

## 2018-04-05 RX ADMIN — MEMANTINE 5 MG: 5 TABLET ORAL at 21:50

## 2018-04-05 RX ADMIN — METHYLPREDNISOLONE SODIUM SUCCINATE 40 MG: 40 INJECTION, POWDER, FOR SOLUTION INTRAMUSCULAR; INTRAVENOUS at 21:51

## 2018-04-05 RX ADMIN — CEFTRIAXONE 1 G: 1 INJECTION, POWDER, FOR SOLUTION INTRAMUSCULAR; INTRAVENOUS at 18:29

## 2018-04-05 RX ADMIN — METHYLPREDNISOLONE SODIUM SUCCINATE 40 MG: 40 INJECTION, POWDER, FOR SOLUTION INTRAMUSCULAR; INTRAVENOUS at 12:34

## 2018-04-05 RX ADMIN — MEMANTINE 5 MG: 5 TABLET ORAL at 08:50

## 2018-04-05 RX ADMIN — QUETIAPINE FUMARATE 12.5 MG: 25 TABLET ORAL at 08:50

## 2018-04-05 RX ADMIN — DULOXETINE HYDROCHLORIDE 30 MG: 30 CAPSULE, DELAYED RELEASE ORAL at 21:50

## 2018-04-05 RX ADMIN — ALBUTEROL SULFATE 2.5 MG: 2.5 SOLUTION RESPIRATORY (INHALATION) at 10:58

## 2018-04-05 RX ADMIN — METHYLPREDNISOLONE SODIUM SUCCINATE 40 MG: 40 INJECTION, POWDER, FOR SOLUTION INTRAMUSCULAR; INTRAVENOUS at 04:37

## 2018-04-05 RX ADMIN — OSELTAMIVIR PHOSPHATE 75 MG: 75 CAPSULE ORAL at 08:49

## 2018-04-05 RX ADMIN — FLECAINIDE ACETATE 100 MG: 100 TABLET ORAL at 21:51

## 2018-04-05 RX ADMIN — BUSPIRONE HYDROCHLORIDE 10 MG: 10 TABLET ORAL at 21:50

## 2018-04-05 RX ADMIN — QUETIAPINE FUMARATE 12.5 MG: 25 TABLET ORAL at 21:51

## 2018-04-05 RX ADMIN — BUSPIRONE HYDROCHLORIDE 10 MG: 10 TABLET ORAL at 15:02

## 2018-04-05 RX ADMIN — ALBUTEROL SULFATE 2.5 MG: 2.5 SOLUTION RESPIRATORY (INHALATION) at 16:59

## 2018-04-05 RX ADMIN — DOCUSATE SODIUM 100 MG: 100 CAPSULE, LIQUID FILLED ORAL at 08:49

## 2018-04-05 RX ADMIN — FAMOTIDINE 20 MG: 20 TABLET ORAL at 08:49

## 2018-04-05 RX ADMIN — PIMAVANSERIN TARTRATE 34 MG: 17 TABLET, COATED ORAL at 21:48

## 2018-04-05 RX ADMIN — DOCUSATE SODIUM 100 MG: 100 CAPSULE, LIQUID FILLED ORAL at 21:50

## 2018-04-05 ASSESSMENT — PAIN SCALES - GENERAL
PAINLEVEL_OUTOF10: 0
PAINLEVEL_OUTOF10: 0

## 2018-04-06 LAB
ANION GAP SERPL CALCULATED.3IONS-SCNC: 11 MEQ/L (ref 7–13)
BASOPHILS ABSOLUTE: 0 K/UL (ref 0–0.2)
BASOPHILS RELATIVE PERCENT: 0.1 %
BUN BLDV-MCNC: 22 MG/DL (ref 8–23)
CALCIUM SERPL-MCNC: 8.2 MG/DL (ref 8.6–10.2)
CHLORIDE BLD-SCNC: 97 MEQ/L (ref 98–107)
CO2: 26 MEQ/L (ref 22–29)
CREAT SERPL-MCNC: 0.47 MG/DL (ref 0.5–0.9)
EOSINOPHILS ABSOLUTE: 0 K/UL (ref 0–0.7)
EOSINOPHILS RELATIVE PERCENT: 0 %
GFR AFRICAN AMERICAN: >60
GFR NON-AFRICAN AMERICAN: >60
GLUCOSE BLD-MCNC: 188 MG/DL (ref 74–109)
HCT VFR BLD CALC: 38.9 % (ref 37–47)
HEMOGLOBIN: 12.8 G/DL (ref 12–16)
INR BLD: 4.1
LYMPHOCYTES ABSOLUTE: 0.6 K/UL (ref 1–4.8)
LYMPHOCYTES RELATIVE PERCENT: 10.3 %
MAGNESIUM: 2 MG/DL (ref 1.7–2.3)
MCH RBC QN AUTO: 29.1 PG (ref 27–31.3)
MCHC RBC AUTO-ENTMCNC: 33 % (ref 33–37)
MCV RBC AUTO: 88.1 FL (ref 82–100)
MONOCYTES ABSOLUTE: 0.7 K/UL (ref 0.2–0.8)
MONOCYTES RELATIVE PERCENT: 11.5 %
NEUTROPHILS ABSOLUTE: 4.6 K/UL (ref 1.4–6.5)
NEUTROPHILS RELATIVE PERCENT: 78.1 %
PDW BLD-RTO: 14.2 % (ref 11.5–14.5)
PLATELET # BLD: 86 K/UL (ref 130–400)
POTASSIUM SERPL-SCNC: 4.3 MEQ/L (ref 3.5–5.1)
PROTHROMBIN TIME: 43.1 SEC (ref 8.1–13.7)
RBC # BLD: 4.41 M/UL (ref 4.2–5.4)
SODIUM BLD-SCNC: 134 MEQ/L (ref 132–144)
URINE CULTURE, ROUTINE: NORMAL
WBC # BLD: 5.9 K/UL (ref 4.8–10.8)

## 2018-04-06 PROCEDURE — 97116 GAIT TRAINING THERAPY: CPT

## 2018-04-06 PROCEDURE — APPSS15 APP SPLIT SHARED TIME 0-15 MINUTES: Performed by: PHYSICIAN ASSISTANT

## 2018-04-06 PROCEDURE — 94760 N-INVAS EAR/PLS OXIMETRY 1: CPT

## 2018-04-06 PROCEDURE — 83735 ASSAY OF MAGNESIUM: CPT

## 2018-04-06 PROCEDURE — 6360000002 HC RX W HCPCS: Performed by: HOSPITALIST

## 2018-04-06 PROCEDURE — 6360000002 HC RX W HCPCS: Performed by: INTERNAL MEDICINE

## 2018-04-06 PROCEDURE — 6370000000 HC RX 637 (ALT 250 FOR IP): Performed by: NURSE PRACTITIONER

## 2018-04-06 PROCEDURE — 93010 ELECTROCARDIOGRAM REPORT: CPT | Performed by: INTERNAL MEDICINE

## 2018-04-06 PROCEDURE — 97110 THERAPEUTIC EXERCISES: CPT

## 2018-04-06 PROCEDURE — 80048 BASIC METABOLIC PNL TOTAL CA: CPT

## 2018-04-06 PROCEDURE — 99232 SBSQ HOSP IP/OBS MODERATE 35: CPT | Performed by: INTERNAL MEDICINE

## 2018-04-06 PROCEDURE — 85025 COMPLETE CBC W/AUTO DIFF WBC: CPT

## 2018-04-06 PROCEDURE — 87086 URINE CULTURE/COLONY COUNT: CPT

## 2018-04-06 PROCEDURE — 2580000003 HC RX 258: Performed by: HOSPITALIST

## 2018-04-06 PROCEDURE — 6370000000 HC RX 637 (ALT 250 FOR IP): Performed by: PSYCHIATRY & NEUROLOGY

## 2018-04-06 PROCEDURE — 94664 DEMO&/EVAL PT USE INHALER: CPT

## 2018-04-06 PROCEDURE — 6370000000 HC RX 637 (ALT 250 FOR IP): Performed by: INTERNAL MEDICINE

## 2018-04-06 PROCEDURE — 6370000000 HC RX 637 (ALT 250 FOR IP): Performed by: CLINICAL NURSE SPECIALIST

## 2018-04-06 PROCEDURE — 1210000000 HC MED SURG R&B

## 2018-04-06 PROCEDURE — 94640 AIRWAY INHALATION TREATMENT: CPT

## 2018-04-06 PROCEDURE — 36415 COLL VENOUS BLD VENIPUNCTURE: CPT

## 2018-04-06 PROCEDURE — 85610 PROTHROMBIN TIME: CPT

## 2018-04-06 RX ORDER — ALBUTEROL SULFATE 2.5 MG/3ML
2.5 SOLUTION RESPIRATORY (INHALATION) 3 TIMES DAILY
Status: DISCONTINUED | OUTPATIENT
Start: 2018-04-06 | End: 2018-04-08

## 2018-04-06 RX ADMIN — METHYLPREDNISOLONE SODIUM SUCCINATE 40 MG: 40 INJECTION, POWDER, FOR SOLUTION INTRAMUSCULAR; INTRAVENOUS at 06:11

## 2018-04-06 RX ADMIN — QUETIAPINE FUMARATE 12.5 MG: 25 TABLET ORAL at 13:07

## 2018-04-06 RX ADMIN — FLECAINIDE ACETATE 100 MG: 100 TABLET ORAL at 21:36

## 2018-04-06 RX ADMIN — OSELTAMIVIR PHOSPHATE 75 MG: 75 CAPSULE ORAL at 08:08

## 2018-04-06 RX ADMIN — DULOXETINE HYDROCHLORIDE 30 MG: 30 CAPSULE, DELAYED RELEASE ORAL at 21:36

## 2018-04-06 RX ADMIN — FLECAINIDE ACETATE 100 MG: 100 TABLET ORAL at 08:12

## 2018-04-06 RX ADMIN — ALBUTEROL SULFATE 2.5 MG: 2.5 SOLUTION RESPIRATORY (INHALATION) at 19:48

## 2018-04-06 RX ADMIN — METOPROLOL SUCCINATE 100 MG: 50 TABLET, EXTENDED RELEASE ORAL at 08:07

## 2018-04-06 RX ADMIN — QUETIAPINE FUMARATE 12.5 MG: 25 TABLET ORAL at 21:37

## 2018-04-06 RX ADMIN — FAMOTIDINE 20 MG: 20 TABLET ORAL at 21:37

## 2018-04-06 RX ADMIN — ALPRAZOLAM 0.25 MG: 0.25 TABLET ORAL at 16:13

## 2018-04-06 RX ADMIN — ALBUTEROL SULFATE 2.5 MG: 2.5 SOLUTION RESPIRATORY (INHALATION) at 22:22

## 2018-04-06 RX ADMIN — BUSPIRONE HYDROCHLORIDE 10 MG: 10 TABLET ORAL at 13:07

## 2018-04-06 RX ADMIN — BUSPIRONE HYDROCHLORIDE 10 MG: 10 TABLET ORAL at 08:08

## 2018-04-06 RX ADMIN — DOCUSATE SODIUM 100 MG: 100 CAPSULE, LIQUID FILLED ORAL at 21:36

## 2018-04-06 RX ADMIN — MEMANTINE 5 MG: 5 TABLET ORAL at 21:36

## 2018-04-06 RX ADMIN — ALBUTEROL SULFATE 2.5 MG: 2.5 SOLUTION RESPIRATORY (INHALATION) at 07:30

## 2018-04-06 RX ADMIN — QUETIAPINE FUMARATE 12.5 MG: 25 TABLET ORAL at 08:08

## 2018-04-06 RX ADMIN — DOCUSATE SODIUM 100 MG: 100 CAPSULE, LIQUID FILLED ORAL at 08:07

## 2018-04-06 RX ADMIN — FAMOTIDINE 20 MG: 20 TABLET ORAL at 08:08

## 2018-04-06 RX ADMIN — MEMANTINE 5 MG: 5 TABLET ORAL at 08:08

## 2018-04-06 RX ADMIN — METHYLPREDNISOLONE SODIUM SUCCINATE 40 MG: 40 INJECTION, POWDER, FOR SOLUTION INTRAMUSCULAR; INTRAVENOUS at 22:16

## 2018-04-06 RX ADMIN — PRIMIDONE 150 MG: 50 TABLET ORAL at 08:07

## 2018-04-06 RX ADMIN — CEFTRIAXONE 1 G: 1 INJECTION, POWDER, FOR SOLUTION INTRAMUSCULAR; INTRAVENOUS at 18:22

## 2018-04-06 RX ADMIN — METHYLPREDNISOLONE SODIUM SUCCINATE 40 MG: 40 INJECTION, POWDER, FOR SOLUTION INTRAMUSCULAR; INTRAVENOUS at 13:07

## 2018-04-06 RX ADMIN — DULOXETINE HYDROCHLORIDE 30 MG: 30 CAPSULE, DELAYED RELEASE ORAL at 08:07

## 2018-04-06 RX ADMIN — BUSPIRONE HYDROCHLORIDE 10 MG: 10 TABLET ORAL at 21:36

## 2018-04-06 RX ADMIN — ALBUTEROL SULFATE 2.5 MG: 2.5 SOLUTION RESPIRATORY (INHALATION) at 13:53

## 2018-04-06 RX ADMIN — OSELTAMIVIR PHOSPHATE 75 MG: 75 CAPSULE ORAL at 21:37

## 2018-04-06 RX ADMIN — VITAMIN D, TAB 1000IU (100/BT) 2000 UNITS: 25 TAB at 08:08

## 2018-04-06 ASSESSMENT — PAIN SCALES - GENERAL
PAINLEVEL_OUTOF10: 0

## 2018-04-07 LAB
BLOOD CULTURE, ROUTINE: NORMAL
CULTURE, BLOOD 2: NORMAL
INR BLD: 2.2
PROTHROMBIN TIME: 23 SEC (ref 8.1–13.7)
URINE CULTURE, ROUTINE: NORMAL

## 2018-04-07 PROCEDURE — 6360000002 HC RX W HCPCS: Performed by: HOSPITALIST

## 2018-04-07 PROCEDURE — 97116 GAIT TRAINING THERAPY: CPT

## 2018-04-07 PROCEDURE — 97110 THERAPEUTIC EXERCISES: CPT

## 2018-04-07 PROCEDURE — 94640 AIRWAY INHALATION TREATMENT: CPT

## 2018-04-07 PROCEDURE — 6360000002 HC RX W HCPCS: Performed by: INTERNAL MEDICINE

## 2018-04-07 PROCEDURE — 6370000000 HC RX 637 (ALT 250 FOR IP): Performed by: INTERNAL MEDICINE

## 2018-04-07 PROCEDURE — 6370000000 HC RX 637 (ALT 250 FOR IP): Performed by: CLINICAL NURSE SPECIALIST

## 2018-04-07 PROCEDURE — 99231 SBSQ HOSP IP/OBS SF/LOW 25: CPT | Performed by: INTERNAL MEDICINE

## 2018-04-07 PROCEDURE — 6370000000 HC RX 637 (ALT 250 FOR IP): Performed by: PSYCHIATRY & NEUROLOGY

## 2018-04-07 PROCEDURE — 85610 PROTHROMBIN TIME: CPT

## 2018-04-07 PROCEDURE — 36415 COLL VENOUS BLD VENIPUNCTURE: CPT

## 2018-04-07 PROCEDURE — 1210000000 HC MED SURG R&B

## 2018-04-07 PROCEDURE — 6370000000 HC RX 637 (ALT 250 FOR IP): Performed by: NURSE PRACTITIONER

## 2018-04-07 PROCEDURE — 93005 ELECTROCARDIOGRAM TRACING: CPT

## 2018-04-07 RX ORDER — PREDNISONE 10 MG/1
10 TABLET ORAL 2 TIMES DAILY
Status: DISCONTINUED | OUTPATIENT
Start: 2018-04-07 | End: 2018-04-08 | Stop reason: HOSPADM

## 2018-04-07 RX ORDER — HYDRALAZINE HYDROCHLORIDE 20 MG/ML
20 INJECTION INTRAMUSCULAR; INTRAVENOUS EVERY 4 HOURS PRN
Status: DISCONTINUED | OUTPATIENT
Start: 2018-04-07 | End: 2018-04-08 | Stop reason: HOSPADM

## 2018-04-07 RX ORDER — CEFUROXIME AXETIL 250 MG/1
250 TABLET ORAL 2 TIMES DAILY
Status: DISCONTINUED | OUTPATIENT
Start: 2018-04-07 | End: 2018-04-08 | Stop reason: HOSPADM

## 2018-04-07 RX ADMIN — METOPROLOL SUCCINATE 100 MG: 50 TABLET, EXTENDED RELEASE ORAL at 09:55

## 2018-04-07 RX ADMIN — WARFARIN SODIUM 3 MG: 2 TABLET ORAL at 17:36

## 2018-04-07 RX ADMIN — DOCUSATE SODIUM 100 MG: 100 CAPSULE, LIQUID FILLED ORAL at 09:57

## 2018-04-07 RX ADMIN — FLECAINIDE ACETATE 100 MG: 100 TABLET ORAL at 09:57

## 2018-04-07 RX ADMIN — PRIMIDONE 150 MG: 50 TABLET ORAL at 09:57

## 2018-04-07 RX ADMIN — BUSPIRONE HYDROCHLORIDE 10 MG: 10 TABLET ORAL at 13:02

## 2018-04-07 RX ADMIN — CEFUROXIME AXETIL 250 MG: 250 TABLET ORAL at 11:23

## 2018-04-07 RX ADMIN — METHYLPREDNISOLONE SODIUM SUCCINATE 40 MG: 40 INJECTION, POWDER, FOR SOLUTION INTRAMUSCULAR; INTRAVENOUS at 07:07

## 2018-04-07 RX ADMIN — HALOPERIDOL LACTATE 2 MG: 5 INJECTION, SOLUTION INTRAMUSCULAR at 20:53

## 2018-04-07 RX ADMIN — OSELTAMIVIR PHOSPHATE 75 MG: 75 CAPSULE ORAL at 09:57

## 2018-04-07 RX ADMIN — MEMANTINE 5 MG: 5 TABLET ORAL at 09:57

## 2018-04-07 RX ADMIN — HALOPERIDOL LACTATE 2 MG: 5 INJECTION, SOLUTION INTRAMUSCULAR at 21:12

## 2018-04-07 RX ADMIN — FAMOTIDINE 20 MG: 20 TABLET ORAL at 09:55

## 2018-04-07 RX ADMIN — PREDNISONE 10 MG: 10 TABLET ORAL at 11:23

## 2018-04-07 RX ADMIN — DULOXETINE HYDROCHLORIDE 30 MG: 30 CAPSULE, DELAYED RELEASE ORAL at 09:57

## 2018-04-07 RX ADMIN — ALPRAZOLAM 0.25 MG: 0.25 TABLET ORAL at 06:59

## 2018-04-07 RX ADMIN — ALBUTEROL SULFATE 2.5 MG: 2.5 SOLUTION RESPIRATORY (INHALATION) at 13:14

## 2018-04-07 RX ADMIN — ALBUTEROL SULFATE 2.5 MG: 2.5 SOLUTION RESPIRATORY (INHALATION) at 22:33

## 2018-04-07 RX ADMIN — QUETIAPINE FUMARATE 12.5 MG: 25 TABLET ORAL at 09:57

## 2018-04-07 RX ADMIN — ALPRAZOLAM 0.25 MG: 0.25 TABLET ORAL at 21:20

## 2018-04-07 RX ADMIN — ALPRAZOLAM 0.25 MG: 0.25 TABLET ORAL at 17:36

## 2018-04-07 RX ADMIN — BUSPIRONE HYDROCHLORIDE 10 MG: 10 TABLET ORAL at 09:57

## 2018-04-07 RX ADMIN — QUETIAPINE FUMARATE 12.5 MG: 25 TABLET ORAL at 13:02

## 2018-04-07 RX ADMIN — VITAMIN D, TAB 1000IU (100/BT) 2000 UNITS: 25 TAB at 09:55

## 2018-04-07 ASSESSMENT — ENCOUNTER SYMPTOMS
SHORTNESS OF BREATH: 0
VOMITING: 0
NAUSEA: 0

## 2018-04-07 ASSESSMENT — PAIN SCALES - GENERAL
PAINLEVEL_OUTOF10: 0

## 2018-04-07 ASSESSMENT — PAIN DESCRIPTION - PAIN TYPE: TYPE: ACUTE PAIN

## 2018-04-08 VITALS
HEART RATE: 114 BPM | WEIGHT: 171 LBS | OXYGEN SATURATION: 96 % | BODY MASS INDEX: 29.19 KG/M2 | SYSTOLIC BLOOD PRESSURE: 152 MMHG | RESPIRATION RATE: 20 BRPM | DIASTOLIC BLOOD PRESSURE: 116 MMHG | HEIGHT: 64 IN | TEMPERATURE: 97.9 F

## 2018-04-08 LAB
EKG ATRIAL RATE: 125 BPM
EKG ATRIAL RATE: 159 BPM
EKG ATRIAL RATE: 73 BPM
EKG ATRIAL RATE: 75 BPM
EKG ATRIAL RATE: 82 BPM
EKG ATRIAL RATE: 82 BPM
EKG ATRIAL RATE: 89 BPM
EKG Q-T INTERVAL: 294 MS
EKG Q-T INTERVAL: 346 MS
EKG Q-T INTERVAL: 348 MS
EKG Q-T INTERVAL: 358 MS
EKG Q-T INTERVAL: 372 MS
EKG Q-T INTERVAL: 410 MS
EKG Q-T INTERVAL: 446 MS
EKG QRS DURATION: 108 MS
EKG QRS DURATION: 112 MS
EKG QRS DURATION: 114 MS
EKG QRS DURATION: 120 MS
EKG QRS DURATION: 82 MS
EKG QRS DURATION: 96 MS
EKG QRS DURATION: 98 MS
EKG QTC CALCULATION (BAZETT): 388 MS
EKG QTC CALCULATION (BAZETT): 434 MS
EKG QTC CALCULATION (BAZETT): 447 MS
EKG QTC CALCULATION (BAZETT): 447 MS
EKG QTC CALCULATION (BAZETT): 457 MS
EKG QTC CALCULATION (BAZETT): 459 MS
EKG QTC CALCULATION (BAZETT): 600 MS
EKG R AXIS: 10 DEGREES
EKG R AXIS: 12 DEGREES
EKG R AXIS: 15 DEGREES
EKG R AXIS: 16 DEGREES
EKG R AXIS: 6 DEGREES
EKG T AXIS: 129 DEGREES
EKG T AXIS: 138 DEGREES
EKG T AXIS: 199 DEGREES
EKG T AXIS: 62 DEGREES
EKG T AXIS: 66 DEGREES
EKG T AXIS: 82 DEGREES
EKG T AXIS: 82 DEGREES
EKG VENTRICULAR RATE: 105 BPM
EKG VENTRICULAR RATE: 105 BPM
EKG VENTRICULAR RATE: 109 BPM
EKG VENTRICULAR RATE: 75 BPM
EKG VENTRICULAR RATE: 87 BPM
EKG VENTRICULAR RATE: 94 BPM
EKG VENTRICULAR RATE: 95 BPM
INR BLD: 1.7
PROTHROMBIN TIME: 17.7 SEC (ref 8.1–13.7)

## 2018-04-08 PROCEDURE — 6360000002 HC RX W HCPCS: Performed by: HOSPITALIST

## 2018-04-08 PROCEDURE — 6370000000 HC RX 637 (ALT 250 FOR IP): Performed by: CLINICAL NURSE SPECIALIST

## 2018-04-08 PROCEDURE — 6370000000 HC RX 637 (ALT 250 FOR IP): Performed by: NURSE PRACTITIONER

## 2018-04-08 PROCEDURE — 36415 COLL VENOUS BLD VENIPUNCTURE: CPT

## 2018-04-08 PROCEDURE — 6370000000 HC RX 637 (ALT 250 FOR IP): Performed by: INTERNAL MEDICINE

## 2018-04-08 PROCEDURE — 93005 ELECTROCARDIOGRAM TRACING: CPT

## 2018-04-08 PROCEDURE — 85610 PROTHROMBIN TIME: CPT

## 2018-04-08 PROCEDURE — 6370000000 HC RX 637 (ALT 250 FOR IP): Performed by: PSYCHIATRY & NEUROLOGY

## 2018-04-08 RX ORDER — METOPROLOL SUCCINATE 100 MG/1
100 TABLET, EXTENDED RELEASE ORAL DAILY
Qty: 30 TABLET | Refills: 3 | Status: ON HOLD | OUTPATIENT
Start: 2018-04-08 | End: 2018-12-24 | Stop reason: HOSPADM

## 2018-04-08 RX ORDER — CEFUROXIME AXETIL 250 MG/1
250 TABLET ORAL 2 TIMES DAILY
Qty: 10 TABLET | Refills: 0 | Status: SHIPPED | OUTPATIENT
Start: 2018-04-08 | End: 2018-04-13

## 2018-04-08 RX ORDER — GUAIFENESIN/DEXTROMETHORPHAN 100-10MG/5
5 SYRUP ORAL EVERY 4 HOURS PRN
Qty: 120 ML | COMMUNITY
Start: 2018-04-08 | End: 2018-04-18

## 2018-04-08 RX ORDER — ALBUTEROL SULFATE 90 UG/1
2 AEROSOL, METERED RESPIRATORY (INHALATION) EVERY 4 HOURS PRN
Qty: 1 INHALER | Refills: 0 | Status: SHIPPED | OUTPATIENT
Start: 2018-04-08 | End: 2018-09-09

## 2018-04-08 RX ORDER — QUETIAPINE FUMARATE 25 MG/1
12.5 TABLET, FILM COATED ORAL 3 TIMES DAILY
Qty: 90 TABLET | Refills: 0 | Status: ON HOLD | OUTPATIENT
Start: 2018-04-08 | End: 2018-12-24 | Stop reason: HOSPADM

## 2018-04-08 RX ORDER — MEMANTINE HYDROCHLORIDE 5 MG/1
5 TABLET ORAL 2 TIMES DAILY
Qty: 60 TABLET | Refills: 0 | Status: SHIPPED | OUTPATIENT
Start: 2018-04-08

## 2018-04-08 RX ORDER — PREDNISONE 10 MG/1
10 TABLET ORAL 2 TIMES DAILY
Qty: 10 TABLET | Refills: 0 | Status: SHIPPED | OUTPATIENT
Start: 2018-04-08 | End: 2018-04-13

## 2018-04-08 RX ORDER — ALBUTEROL SULFATE 90 UG/1
2 AEROSOL, METERED RESPIRATORY (INHALATION) EVERY 4 HOURS PRN
Status: DISCONTINUED | OUTPATIENT
Start: 2018-04-08 | End: 2018-04-08 | Stop reason: HOSPADM

## 2018-04-08 RX ADMIN — BUSPIRONE HYDROCHLORIDE 10 MG: 10 TABLET ORAL at 09:21

## 2018-04-08 RX ADMIN — PREDNISONE 10 MG: 10 TABLET ORAL at 09:21

## 2018-04-08 RX ADMIN — VITAMIN D, TAB 1000IU (100/BT) 2000 UNITS: 25 TAB at 09:21

## 2018-04-08 RX ADMIN — MEMANTINE 5 MG: 5 TABLET ORAL at 09:20

## 2018-04-08 RX ADMIN — PRIMIDONE 150 MG: 50 TABLET ORAL at 09:21

## 2018-04-08 RX ADMIN — QUETIAPINE FUMARATE 12.5 MG: 25 TABLET ORAL at 09:21

## 2018-04-08 RX ADMIN — FAMOTIDINE 20 MG: 20 TABLET ORAL at 09:21

## 2018-04-08 RX ADMIN — CEFUROXIME AXETIL 250 MG: 250 TABLET ORAL at 09:20

## 2018-04-08 RX ADMIN — ALPRAZOLAM 0.25 MG: 0.25 TABLET ORAL at 09:21

## 2018-04-08 RX ADMIN — ALBUTEROL SULFATE 2.5 MG: 2.5 SOLUTION RESPIRATORY (INHALATION) at 08:51

## 2018-04-08 RX ADMIN — FLECAINIDE ACETATE 100 MG: 100 TABLET ORAL at 09:20

## 2018-04-08 RX ADMIN — METOPROLOL SUCCINATE 100 MG: 50 TABLET, EXTENDED RELEASE ORAL at 09:20

## 2018-04-08 RX ADMIN — DOCUSATE SODIUM 100 MG: 100 CAPSULE, LIQUID FILLED ORAL at 09:21

## 2018-04-08 RX ADMIN — DULOXETINE HYDROCHLORIDE 30 MG: 30 CAPSULE, DELAYED RELEASE ORAL at 09:21

## 2018-04-08 ASSESSMENT — PAIN SCALES - GENERAL
PAINLEVEL_OUTOF10: 0
PAINLEVEL_OUTOF10: 0

## 2018-04-08 ASSESSMENT — ENCOUNTER SYMPTOMS
SHORTNESS OF BREATH: 0
NAUSEA: 0
VOMITING: 0

## 2018-04-09 PROCEDURE — 93010 ELECTROCARDIOGRAM REPORT: CPT | Performed by: INTERNAL MEDICINE

## 2018-04-14 ENCOUNTER — APPOINTMENT (OUTPATIENT)
Dept: GENERAL RADIOLOGY | Age: 83
End: 2018-04-14
Payer: MEDICARE

## 2018-04-14 ENCOUNTER — HOSPITAL ENCOUNTER (EMERGENCY)
Age: 83
Discharge: HOME | End: 2018-04-14
Payer: MEDICARE

## 2018-04-14 VITALS
HEIGHT: 63 IN | DIASTOLIC BLOOD PRESSURE: 83 MMHG | OXYGEN SATURATION: 97 % | WEIGHT: 171 LBS | BODY MASS INDEX: 30.3 KG/M2 | TEMPERATURE: 98 F | RESPIRATION RATE: 18 BRPM | SYSTOLIC BLOOD PRESSURE: 143 MMHG | HEART RATE: 82 BPM

## 2018-04-14 DIAGNOSIS — J10.1 INFLUENZA B: ICD-10-CM

## 2018-04-14 DIAGNOSIS — R05.9 COUGH: Primary | ICD-10-CM

## 2018-04-14 LAB
ALBUMIN SERPL-MCNC: 3.9 G/DL (ref 3.9–4.9)
ALP BLD-CCNC: 67 U/L (ref 40–130)
ALT SERPL-CCNC: 39 U/L (ref 0–33)
ANION GAP SERPL CALCULATED.3IONS-SCNC: 11 MEQ/L (ref 7–13)
AST SERPL-CCNC: 28 U/L (ref 0–35)
BASOPHILS ABSOLUTE: 0.1 K/UL (ref 0–0.2)
BASOPHILS RELATIVE PERCENT: 1 %
BILIRUB SERPL-MCNC: 0.5 MG/DL (ref 0–1.2)
BUN BLDV-MCNC: 19 MG/DL (ref 8–23)
CALCIUM SERPL-MCNC: 8.6 MG/DL (ref 8.6–10.2)
CHLORIDE BLD-SCNC: 97 MEQ/L (ref 98–107)
CO2: 29 MEQ/L (ref 22–29)
CREAT SERPL-MCNC: 0.86 MG/DL (ref 0.5–0.9)
EOSINOPHILS ABSOLUTE: 0.1 K/UL (ref 0–0.7)
EOSINOPHILS RELATIVE PERCENT: 1.2 %
GFR AFRICAN AMERICAN: >60
GFR NON-AFRICAN AMERICAN: >60
GLOBULIN: 2.4 G/DL (ref 2.3–3.5)
GLUCOSE BLD-MCNC: 119 MG/DL (ref 74–109)
HCT VFR BLD CALC: 38.2 % (ref 37–47)
HEMOGLOBIN: 12.3 G/DL (ref 12–16)
INR BLD: 1.5
LYMPHOCYTES ABSOLUTE: 1.4 K/UL (ref 1–4.8)
LYMPHOCYTES RELATIVE PERCENT: 12.1 %
MCH RBC QN AUTO: 28.5 PG (ref 27–31.3)
MCHC RBC AUTO-ENTMCNC: 32.2 % (ref 33–37)
MCV RBC AUTO: 88.6 FL (ref 82–100)
MONOCYTES ABSOLUTE: 0.7 K/UL (ref 0.2–0.8)
MONOCYTES RELATIVE PERCENT: 6.5 %
NEUTROPHILS ABSOLUTE: 8.9 K/UL (ref 1.4–6.5)
NEUTROPHILS RELATIVE PERCENT: 79.2 %
PDW BLD-RTO: 14.7 % (ref 11.5–14.5)
PLATELET # BLD: 174 K/UL (ref 130–400)
POTASSIUM SERPL-SCNC: 4.3 MEQ/L (ref 3.5–5.1)
PRO-BNP: 3715 PG/ML
PROTHROMBIN TIME: 15.7 SEC (ref 9.6–12.3)
RBC # BLD: 4.31 M/UL (ref 4.2–5.4)
SODIUM BLD-SCNC: 137 MEQ/L (ref 132–144)
TOTAL PROTEIN: 6.3 G/DL (ref 6.4–8.1)
WBC # BLD: 11.3 K/UL (ref 4.8–10.8)

## 2018-04-14 PROCEDURE — 85025 COMPLETE CBC W/AUTO DIFF WBC: CPT

## 2018-04-14 PROCEDURE — 80053 COMPREHEN METABOLIC PANEL: CPT

## 2018-04-14 PROCEDURE — 71046 X-RAY EXAM CHEST 2 VIEWS: CPT

## 2018-04-14 PROCEDURE — 83880 ASSAY OF NATRIURETIC PEPTIDE: CPT

## 2018-04-14 PROCEDURE — 36415 COLL VENOUS BLD VENIPUNCTURE: CPT

## 2018-04-14 PROCEDURE — 99284 EMERGENCY DEPT VISIT MOD MDM: CPT

## 2018-04-14 PROCEDURE — 85610 PROTHROMBIN TIME: CPT

## 2018-04-14 RX ORDER — BENZONATATE 100 MG/1
100 CAPSULE ORAL 3 TIMES DAILY PRN
Qty: 20 CAPSULE | Refills: 0 | Status: SHIPPED | OUTPATIENT
Start: 2018-04-14 | End: 2018-09-09

## 2018-04-14 ASSESSMENT — ENCOUNTER SYMPTOMS
SHORTNESS OF BREATH: 0
DIARRHEA: 0
NAUSEA: 0
VOMITING: 0
COUGH: 1
ABDOMINAL PAIN: 0

## 2018-04-23 ENCOUNTER — OFFICE VISIT (OUTPATIENT)
Dept: PULMONOLOGY | Age: 83
End: 2018-04-23
Payer: MEDICARE

## 2018-04-23 VITALS
DIASTOLIC BLOOD PRESSURE: 70 MMHG | TEMPERATURE: 98.5 F | HEART RATE: 109 BPM | SYSTOLIC BLOOD PRESSURE: 118 MMHG | OXYGEN SATURATION: 92 % | WEIGHT: 159.3 LBS | BODY MASS INDEX: 28.23 KG/M2 | HEIGHT: 63 IN

## 2018-04-23 DIAGNOSIS — K21.9 GASTROESOPHAGEAL REFLUX DISEASE WITHOUT ESOPHAGITIS: ICD-10-CM

## 2018-04-23 DIAGNOSIS — Z09 HOSPITAL DISCHARGE FOLLOW-UP: Primary | ICD-10-CM

## 2018-04-23 DIAGNOSIS — J11.1 INFLUENZAL BRONCHITIS: ICD-10-CM

## 2018-04-23 PROCEDURE — G8427 DOCREV CUR MEDS BY ELIG CLIN: HCPCS | Performed by: PHYSICIAN ASSISTANT

## 2018-04-23 PROCEDURE — 1090F PRES/ABSN URINE INCON ASSESS: CPT | Performed by: PHYSICIAN ASSISTANT

## 2018-04-23 PROCEDURE — G8419 CALC BMI OUT NRM PARAM NOF/U: HCPCS | Performed by: PHYSICIAN ASSISTANT

## 2018-04-23 PROCEDURE — 4040F PNEUMOC VAC/ADMIN/RCVD: CPT | Performed by: PHYSICIAN ASSISTANT

## 2018-04-23 PROCEDURE — 1123F ACP DISCUSS/DSCN MKR DOCD: CPT | Performed by: PHYSICIAN ASSISTANT

## 2018-04-23 PROCEDURE — 1036F TOBACCO NON-USER: CPT | Performed by: PHYSICIAN ASSISTANT

## 2018-04-23 PROCEDURE — G8400 PT W/DXA NO RESULTS DOC: HCPCS | Performed by: PHYSICIAN ASSISTANT

## 2018-04-23 PROCEDURE — 1111F DSCHRG MED/CURRENT MED MERGE: CPT | Performed by: PHYSICIAN ASSISTANT

## 2018-04-23 PROCEDURE — 99214 OFFICE O/P EST MOD 30 MIN: CPT | Performed by: PHYSICIAN ASSISTANT

## 2018-04-23 ASSESSMENT — ENCOUNTER SYMPTOMS
VOICE CHANGE: 0
TROUBLE SWALLOWING: 0
SINUS PAIN: 0
COUGH: 0
RHINORRHEA: 0
ABDOMINAL PAIN: 0
SORE THROAT: 0
CHEST TIGHTNESS: 0
BACK PAIN: 0
STRIDOR: 0
WHEEZING: 0
SHORTNESS OF BREATH: 0
SINUS PRESSURE: 0

## 2018-05-08 PROBLEM — R77.8 ELEVATED TROPONIN: Status: RESOLVED | Noted: 2018-04-03 | Resolved: 2018-05-08

## 2018-09-08 ENCOUNTER — HOSPITAL ENCOUNTER (EMERGENCY)
Age: 83
Discharge: HOME | End: 2018-09-09
Attending: EMERGENCY MEDICINE
Payer: MEDICARE

## 2018-09-08 DIAGNOSIS — R07.89 ATYPICAL CHEST PAIN: Primary | ICD-10-CM

## 2018-09-08 LAB
EKG ATRIAL RATE: 144 BPM
EKG Q-T INTERVAL: 322 MS
EKG QRS DURATION: 82 MS
EKG QTC CALCULATION (BAZETT): 441 MS
EKG R AXIS: 23 DEGREES
EKG T AXIS: 164 DEGREES
EKG VENTRICULAR RATE: 113 BPM

## 2018-09-08 PROCEDURE — 93005 ELECTROCARDIOGRAM TRACING: CPT

## 2018-09-08 PROCEDURE — 99285 EMERGENCY DEPT VISIT HI MDM: CPT

## 2018-09-08 RX ORDER — LORAZEPAM 2 MG/ML
0.5 INJECTION INTRAMUSCULAR ONCE
Status: DISCONTINUED | OUTPATIENT
Start: 2018-09-08 | End: 2018-09-09

## 2018-09-08 ASSESSMENT — PAIN SCALES - WONG BAKER: WONGBAKER_NUMERICALRESPONSE: 4

## 2018-09-08 ASSESSMENT — PAIN DESCRIPTION - ONSET: ONSET: ON-GOING

## 2018-09-08 ASSESSMENT — PAIN SCALES - GENERAL: PAINLEVEL_OUTOF10: 4

## 2018-09-08 ASSESSMENT — PAIN DESCRIPTION - PROGRESSION: CLINICAL_PROGRESSION: NOT CHANGED

## 2018-09-08 ASSESSMENT — PAIN DESCRIPTION - FREQUENCY: FREQUENCY: CONTINUOUS

## 2018-09-08 ASSESSMENT — PAIN DESCRIPTION - LOCATION: LOCATION: CHEST

## 2018-09-08 ASSESSMENT — PAIN DESCRIPTION - DESCRIPTORS: DESCRIPTORS: PRESSURE;SHARP

## 2018-09-09 ENCOUNTER — APPOINTMENT (OUTPATIENT)
Dept: GENERAL RADIOLOGY | Age: 83
End: 2018-09-09
Payer: MEDICARE

## 2018-09-09 ENCOUNTER — APPOINTMENT (OUTPATIENT)
Dept: ULTRASOUND IMAGING | Age: 83
End: 2018-09-09
Payer: MEDICARE

## 2018-09-09 VITALS
BODY MASS INDEX: 28.16 KG/M2 | WEIGHT: 153 LBS | SYSTOLIC BLOOD PRESSURE: 145 MMHG | RESPIRATION RATE: 19 BRPM | OXYGEN SATURATION: 98 % | HEIGHT: 62 IN | TEMPERATURE: 97.8 F | HEART RATE: 87 BPM | DIASTOLIC BLOOD PRESSURE: 79 MMHG

## 2018-09-09 LAB
ALBUMIN SERPL-MCNC: 4.3 G/DL (ref 3.9–4.9)
ALP BLD-CCNC: 79 U/L (ref 40–130)
ALT SERPL-CCNC: 24 U/L (ref 0–33)
ANION GAP SERPL CALCULATED.3IONS-SCNC: 13 MEQ/L (ref 7–13)
APTT: 36.1 SEC (ref 21.6–35.4)
AST SERPL-CCNC: 33 U/L (ref 0–35)
BASOPHILS ABSOLUTE: 0.1 K/UL (ref 0–0.2)
BASOPHILS RELATIVE PERCENT: 0.8 %
BILIRUB SERPL-MCNC: 0.3 MG/DL (ref 0–1.2)
BUN BLDV-MCNC: 12 MG/DL (ref 8–23)
CALCIUM SERPL-MCNC: 9.3 MG/DL (ref 8.6–10.2)
CHLORIDE BLD-SCNC: 102 MEQ/L (ref 98–107)
CO2: 26 MEQ/L (ref 22–29)
CREAT SERPL-MCNC: 0.68 MG/DL (ref 0.5–0.9)
EOSINOPHILS ABSOLUTE: 0.1 K/UL (ref 0–0.7)
EOSINOPHILS RELATIVE PERCENT: 1.6 %
GFR AFRICAN AMERICAN: >60
GFR NON-AFRICAN AMERICAN: >60
GLOBULIN: 2.9 G/DL (ref 2.3–3.5)
GLUCOSE BLD-MCNC: 155 MG/DL (ref 74–109)
HCT VFR BLD CALC: 43.5 % (ref 37–47)
HEMOGLOBIN: 14.6 G/DL (ref 12–16)
INR BLD: 2.4
LYMPHOCYTES ABSOLUTE: 1.6 K/UL (ref 1–4.8)
LYMPHOCYTES RELATIVE PERCENT: 22.3 %
MCH RBC QN AUTO: 29.6 PG (ref 27–31.3)
MCHC RBC AUTO-ENTMCNC: 33.5 % (ref 33–37)
MCV RBC AUTO: 88.3 FL (ref 82–100)
MONOCYTES ABSOLUTE: 0.7 K/UL (ref 0.2–0.8)
MONOCYTES RELATIVE PERCENT: 9.9 %
NEUTROPHILS ABSOLUTE: 4.8 K/UL (ref 1.4–6.5)
NEUTROPHILS RELATIVE PERCENT: 65.4 %
PDW BLD-RTO: 14.4 % (ref 11.5–14.5)
PLATELET # BLD: 150 K/UL (ref 130–400)
POTASSIUM SERPL-SCNC: 4.2 MEQ/L (ref 3.5–5.1)
PRO-BNP: 1145 PG/ML
PROTHROMBIN TIME: 25.3 SEC (ref 9.6–12.3)
RBC # BLD: 4.93 M/UL (ref 4.2–5.4)
SODIUM BLD-SCNC: 141 MEQ/L (ref 132–144)
TOTAL CK: 120 U/L (ref 0–170)
TOTAL PROTEIN: 7.2 G/DL (ref 6.4–8.1)
TROPONIN: <0.01 NG/ML (ref 0–0.01)
WBC # BLD: 7.4 K/UL (ref 4.8–10.8)

## 2018-09-09 PROCEDURE — 93971 EXTREMITY STUDY: CPT

## 2018-09-09 PROCEDURE — 84484 ASSAY OF TROPONIN QUANT: CPT

## 2018-09-09 PROCEDURE — 80053 COMPREHEN METABOLIC PANEL: CPT

## 2018-09-09 PROCEDURE — 85730 THROMBOPLASTIN TIME PARTIAL: CPT

## 2018-09-09 PROCEDURE — 85610 PROTHROMBIN TIME: CPT

## 2018-09-09 PROCEDURE — 85025 COMPLETE CBC W/AUTO DIFF WBC: CPT

## 2018-09-09 PROCEDURE — 71045 X-RAY EXAM CHEST 1 VIEW: CPT

## 2018-09-09 PROCEDURE — 82550 ASSAY OF CK (CPK): CPT

## 2018-09-09 PROCEDURE — 36415 COLL VENOUS BLD VENIPUNCTURE: CPT

## 2018-09-09 PROCEDURE — 83880 ASSAY OF NATRIURETIC PEPTIDE: CPT

## 2018-09-09 RX ORDER — LORAZEPAM 1 MG/1
0.5 TABLET ORAL ONCE
Status: DISCONTINUED | OUTPATIENT
Start: 2018-09-09 | End: 2018-09-09 | Stop reason: HOSPADM

## 2018-09-09 RX ORDER — DIGOXIN 125 MCG
0.25 TABLET ORAL DAILY
COMMUNITY
End: 2018-12-19

## 2018-09-09 RX ORDER — DILTIAZEM HYDROCHLORIDE 120 MG/1
240 CAPSULE, COATED, EXTENDED RELEASE ORAL DAILY
Status: ON HOLD | COMMUNITY
End: 2018-12-24

## 2018-09-09 RX ORDER — LORAZEPAM 2 MG/ML
1 INJECTION INTRAMUSCULAR ONCE
Status: DISCONTINUED | OUTPATIENT
Start: 2018-09-09 | End: 2018-09-09 | Stop reason: HOSPADM

## 2018-09-09 RX ORDER — METOPROLOL TARTRATE 100 MG/1
100 TABLET ORAL DAILY
COMMUNITY
End: 2018-12-18

## 2018-09-09 ASSESSMENT — ENCOUNTER SYMPTOMS
COUGH: 0
SHORTNESS OF BREATH: 0
CHEST TIGHTNESS: 0
DIARRHEA: 0
TROUBLE SWALLOWING: 0
APNEA: 0
WHEEZING: 0
NAUSEA: 0
ABDOMINAL PAIN: 0
ALLERGIC/IMMUNOLOGIC NEGATIVE: 1
VOMITING: 0
COLOR CHANGE: 0
BLOOD IN STOOL: 0
EYE PAIN: 0

## 2018-09-09 NOTE — ED PROVIDER NOTES
[address] [facility] ED  eMERGENCY dEPARTMENT eNCOUnter      Pt Name: Darvin Clark  MRN: [id number]  [place] 1934  Date of evaluation: 9/8/2018  Provider: Ally Tavarez MD    CHIEF COMPLAINT       Chief Complaint   Patient presents with    Chest Pain     pt to room 17 via squad from home with complaint of chest pain and high blood pressure today    Hypertension         HISTORY OF PRESENT ILLNESS   (Location/Symptom, Timing/Onset, Context/Setting, Quality, Duration, Modifying Factors, Severity)  Note limiting factors. Darvin Clark is a 80 y.o. female who presents to the emergency department by EMS c/o high blood pressure and chest pain. Reports that her blood pressure was elevated yesterday and again today when she checked it. Her chest pain began today and is localized to the left side, \"feels like a sharp, stabbing, knife\", and does not radiate to her back, arm, or neck. She has not had pain like this in the past, and states that her  was yelling very loudly which made her feel anxious. She did not take any medication to try and alleviate her chest pain. Reports that she is taking coumadin for atrial fibrillation. She is also c/o right lower extremity edema. Reports that this began one week ago and that it extends up to about her knee. Endorses a history of DVT in that leg \"years ago\". Endorses some dizziness and denies any headache, diaphoresis, numbness or tingling, cough, shortness of breath, abdominal pain, nausea, vomiting, or diarrhea. HPI    Nursing Notes were reviewed. REVIEW OF SYSTEMS    (2-9 systems for level 4, 10 or more for level 5)     Review of Systems   Constitutional: Negative for chills, diaphoresis, fatigue and fever. HENT: Negative for hearing loss and trouble swallowing. Eyes: Negative for pain. Respiratory: Negative for apnea, cough, chest tightness, shortness of breath and wheezing.     Cardiovascular: Positive for chest pain, palpitations

## 2018-09-09 NOTE — ED NOTES
This RN at bedside to give patient Ativan as ordered  Patient stated \" My Doctor told me no more ativan. It makes me all doped up. \"  This RN explained to patient that only a small amount was ordered to help her relax. Patient continued to refuse this medication. Dr Caden Mcclellan made aware.       Howard Powers RN  09/09/18 8732

## 2018-09-09 NOTE — ED TRIAGE NOTES
Pt here via squad with complaint of chest pain and high blood pressure which pt states started yesterday and states her  \"yells a lot\" and has right lower leg edema and history of blood clot in right leg

## 2018-09-10 PROCEDURE — 93010 ELECTROCARDIOGRAM REPORT: CPT | Performed by: INTERNAL MEDICINE

## 2018-12-14 ENCOUNTER — HOSPITAL ENCOUNTER (EMERGENCY)
Age: 83
Discharge: HOME | End: 2018-12-15
Payer: MEDICARE

## 2018-12-14 VITALS
TEMPERATURE: 98.3 F | BODY MASS INDEX: 25.71 KG/M2 | RESPIRATION RATE: 16 BRPM | DIASTOLIC BLOOD PRESSURE: 99 MMHG | HEART RATE: 97 BPM | WEIGHT: 160 LBS | SYSTOLIC BLOOD PRESSURE: 158 MMHG | HEIGHT: 66 IN | OXYGEN SATURATION: 98 %

## 2018-12-14 DIAGNOSIS — F41.1 ANXIETY STATE: Primary | ICD-10-CM

## 2018-12-14 DIAGNOSIS — S63.502D LEFT WRIST SPRAIN, SUBSEQUENT ENCOUNTER: ICD-10-CM

## 2018-12-14 PROCEDURE — 99283 EMERGENCY DEPT VISIT LOW MDM: CPT

## 2018-12-14 RX ORDER — ACETAMINOPHEN 325 MG/1
650 TABLET ORAL ONCE
Status: COMPLETED | OUTPATIENT
Start: 2018-12-14 | End: 2018-12-15

## 2018-12-15 PROCEDURE — 6370000000 HC RX 637 (ALT 250 FOR IP): Performed by: NURSE PRACTITIONER

## 2018-12-15 RX ADMIN — ACETAMINOPHEN 650 MG: 325 TABLET ORAL at 00:14

## 2018-12-15 ASSESSMENT — ENCOUNTER SYMPTOMS
WHEEZING: 0
CHEST TIGHTNESS: 0
BACK PAIN: 0
VOMITING: 0
SHORTNESS OF BREATH: 0
TROUBLE SWALLOWING: 0
DIARRHEA: 0
RHINORRHEA: 0
COLOR CHANGE: 0
COUGH: 0
NAUSEA: 0
SORE THROAT: 0
ABDOMINAL PAIN: 0

## 2018-12-15 ASSESSMENT — PAIN SCALES - GENERAL: PAINLEVEL_OUTOF10: 4

## 2018-12-15 NOTE — ED NOTES
MSP intact. Patient states the splint gives her relief.  at bedside to take patient home. Diamond Francis RN  12/15/18 0030

## 2018-12-18 ENCOUNTER — HOSPITAL ENCOUNTER (INPATIENT)
Age: 83
LOS: 5 days | Discharge: HOME | DRG: 884 | End: 2018-12-24
Attending: PSYCHIATRY & NEUROLOGY | Admitting: PSYCHIATRY & NEUROLOGY
Payer: MEDICARE

## 2018-12-18 DIAGNOSIS — F03.90 DEMENTIA WITHOUT BEHAVIORAL DISTURBANCE, UNSPECIFIED DEMENTIA TYPE: Primary | ICD-10-CM

## 2018-12-18 DIAGNOSIS — Z86.718 HISTORY OF DVT (DEEP VEIN THROMBOSIS): ICD-10-CM

## 2018-12-18 DIAGNOSIS — I48.0 PAROXYSMAL ATRIAL FIBRILLATION (HCC): ICD-10-CM

## 2018-12-18 LAB
ACETAMINOPHEN LEVEL: <5 UG/ML (ref 10–30)
ALBUMIN SERPL-MCNC: 4.8 G/DL (ref 3.9–4.9)
ALP BLD-CCNC: 105 U/L (ref 40–130)
ALT SERPL-CCNC: 25 U/L (ref 0–33)
AMPHETAMINE SCREEN, URINE: NORMAL
ANION GAP SERPL CALCULATED.3IONS-SCNC: 13 MEQ/L (ref 7–13)
AST SERPL-CCNC: 42 U/L (ref 0–35)
BARBITURATE SCREEN URINE: NORMAL
BASOPHILS ABSOLUTE: 0 K/UL (ref 0–0.2)
BASOPHILS RELATIVE PERCENT: 0.6 %
BENZODIAZEPINE SCREEN, URINE: NORMAL
BILIRUB SERPL-MCNC: 0.6 MG/DL (ref 0–1.2)
BILIRUBIN URINE: NEGATIVE
BLOOD, URINE: NEGATIVE
BUN BLDV-MCNC: 14 MG/DL (ref 8–23)
CALCIUM SERPL-MCNC: 9.8 MG/DL (ref 8.6–10.2)
CANNABINOID SCREEN URINE: NORMAL
CHLORIDE BLD-SCNC: 97 MEQ/L (ref 98–107)
CK MB: 3.9 NG/ML (ref 0–3.8)
CLARITY: CLEAR
CO2: 30 MEQ/L (ref 22–29)
COCAINE METABOLITE SCREEN URINE: NORMAL
COLOR: ABNORMAL
CREAT SERPL-MCNC: 0.85 MG/DL (ref 0.5–0.9)
CREATINE KINASE-MB INDEX: 2.3 % (ref 0–3.5)
EOSINOPHILS ABSOLUTE: 0.1 K/UL (ref 0–0.7)
EOSINOPHILS RELATIVE PERCENT: 1.2 %
ETHANOL PERCENT: NORMAL G/DL
ETHANOL: <10 MG/DL (ref 0–0.08)
GFR AFRICAN AMERICAN: >60
GFR NON-AFRICAN AMERICAN: >60
GLOBULIN: 3.7 G/DL (ref 2.3–3.5)
GLUCOSE BLD-MCNC: 131 MG/DL (ref 74–109)
GLUCOSE URINE: 500 MG/DL
HCT VFR BLD CALC: 47.5 % (ref 37–47)
HEMOGLOBIN: 15.9 G/DL (ref 12–16)
KETONES, URINE: ABNORMAL MG/DL
LEUKOCYTE ESTERASE, URINE: NEGATIVE
LYMPHOCYTES ABSOLUTE: 1.6 K/UL (ref 1–4.8)
LYMPHOCYTES RELATIVE PERCENT: 20.5 %
Lab: NORMAL
MCH RBC QN AUTO: 30.2 PG (ref 27–31.3)
MCHC RBC AUTO-ENTMCNC: 33.5 % (ref 33–37)
MCV RBC AUTO: 90.3 FL (ref 82–100)
MONOCYTES ABSOLUTE: 0.7 K/UL (ref 0.2–0.8)
MONOCYTES RELATIVE PERCENT: 8.6 %
NEUTROPHILS ABSOLUTE: 5.2 K/UL (ref 1.4–6.5)
NEUTROPHILS RELATIVE PERCENT: 69.1 %
NITRITE, URINE: NEGATIVE
OPIATE SCREEN URINE: NORMAL
PDW BLD-RTO: 13.6 % (ref 11.5–14.5)
PH UA: 5 (ref 5–9)
PHENCYCLIDINE SCREEN URINE: NORMAL
PLATELET # BLD: 198 K/UL (ref 130–400)
POTASSIUM SERPL-SCNC: 4 MEQ/L (ref 3.5–5.1)
PROTEIN UA: NEGATIVE MG/DL
RBC # BLD: 5.26 M/UL (ref 4.2–5.4)
SALICYLATE, SERUM: <0.3 MG/DL (ref 15–30)
SODIUM BLD-SCNC: 140 MEQ/L (ref 132–144)
SPECIFIC GRAVITY UA: 1.02 (ref 1–1.03)
TOTAL CK: 171 U/L (ref 0–170)
TOTAL PROTEIN: 8.5 G/DL (ref 6.4–8.1)
TSH SERPL DL<=0.05 MIU/L-ACNC: 1.31 UIU/ML (ref 0.27–4.2)
URINE REFLEX TO CULTURE: ABNORMAL
UROBILINOGEN, URINE: 0.2 E.U./DL
WBC # BLD: 7.6 K/UL (ref 4.8–10.8)

## 2018-12-18 PROCEDURE — 80307 DRUG TEST PRSMV CHEM ANLYZR: CPT

## 2018-12-18 PROCEDURE — G0480 DRUG TEST DEF 1-7 CLASSES: HCPCS

## 2018-12-18 PROCEDURE — 85025 COMPLETE CBC W/AUTO DIFF WBC: CPT

## 2018-12-18 PROCEDURE — 80053 COMPREHEN METABOLIC PANEL: CPT

## 2018-12-18 PROCEDURE — 82550 ASSAY OF CK (CPK): CPT

## 2018-12-18 PROCEDURE — 99285 EMERGENCY DEPT VISIT HI MDM: CPT

## 2018-12-18 PROCEDURE — 84443 ASSAY THYROID STIM HORMONE: CPT

## 2018-12-18 PROCEDURE — 6370000000 HC RX 637 (ALT 250 FOR IP): Performed by: PERSONAL EMERGENCY RESPONSE ATTENDANT

## 2018-12-18 PROCEDURE — 36415 COLL VENOUS BLD VENIPUNCTURE: CPT

## 2018-12-18 PROCEDURE — 82553 CREATINE MB FRACTION: CPT

## 2018-12-18 PROCEDURE — 81003 URINALYSIS AUTO W/O SCOPE: CPT

## 2018-12-18 RX ORDER — FLUTICASONE PROPIONATE 50 MCG
2 SPRAY, SUSPENSION (ML) NASAL 2 TIMES DAILY PRN
COMMUNITY

## 2018-12-18 RX ORDER — ACETAMINOPHEN 500 MG
1000 TABLET ORAL ONCE
Status: COMPLETED | OUTPATIENT
Start: 2018-12-18 | End: 2018-12-18

## 2018-12-18 RX ORDER — FUROSEMIDE 20 MG/1
20 TABLET ORAL SEE ADMIN INSTRUCTIONS
COMMUNITY

## 2018-12-18 RX ORDER — METOPROLOL SUCCINATE 50 MG/1
150 TABLET, EXTENDED RELEASE ORAL ONCE
Status: COMPLETED | OUTPATIENT
Start: 2018-12-18 | End: 2018-12-18

## 2018-12-18 RX ORDER — MEMANTINE HYDROCHLORIDE 5 MG/1
5 TABLET ORAL ONCE
Status: COMPLETED | OUTPATIENT
Start: 2018-12-18 | End: 2018-12-18

## 2018-12-18 RX ORDER — ACETAMINOPHEN 80 MG
TABLET,CHEWABLE ORAL ONCE
Status: DISCONTINUED | OUTPATIENT
Start: 2018-12-18 | End: 2018-12-20

## 2018-12-18 RX ORDER — DULOXETIN HYDROCHLORIDE 30 MG/1
30 CAPSULE, DELAYED RELEASE ORAL ONCE
Status: COMPLETED | OUTPATIENT
Start: 2018-12-18 | End: 2018-12-18

## 2018-12-18 RX ORDER — QUETIAPINE FUMARATE 25 MG/1
12.5 TABLET, FILM COATED ORAL ONCE
Status: COMPLETED | OUTPATIENT
Start: 2018-12-18 | End: 2018-12-18

## 2018-12-18 RX ADMIN — DULOXETINE HYDROCHLORIDE 30 MG: 30 CAPSULE, DELAYED RELEASE ORAL at 23:38

## 2018-12-18 RX ADMIN — METOPROLOL SUCCINATE 150 MG: 50 TABLET, EXTENDED RELEASE ORAL at 23:28

## 2018-12-18 RX ADMIN — ACETAMINOPHEN 1000 MG: 500 TABLET ORAL at 23:19

## 2018-12-18 RX ADMIN — MEMANTINE HYDROCHLORIDE 5 MG: 5 TABLET ORAL at 23:38

## 2018-12-18 RX ADMIN — QUETIAPINE FUMARATE 12.5 MG: 25 TABLET ORAL at 23:33

## 2018-12-18 ASSESSMENT — ENCOUNTER SYMPTOMS
COUGH: 0
BLOOD IN STOOL: 0
RHINORRHEA: 0
DIARRHEA: 0
SORE THROAT: 0
COLOR CHANGE: 0
VOMITING: 0
SHORTNESS OF BREATH: 0
NAUSEA: 0
ABDOMINAL PAIN: 0

## 2018-12-18 ASSESSMENT — PAIN SCALES - GENERAL: PAINLEVEL_OUTOF10: 10

## 2018-12-18 ASSESSMENT — PAIN DESCRIPTION - LOCATION: LOCATION: TEETH

## 2018-12-18 ASSESSMENT — PAIN DESCRIPTION - PAIN TYPE: TYPE: CHRONIC PAIN

## 2018-12-18 ASSESSMENT — PATIENT HEALTH QUESTIONNAIRE - PHQ9: SUM OF ALL RESPONSES TO PHQ QUESTIONS 1-9: 2

## 2018-12-18 ASSESSMENT — PAIN DESCRIPTION - DESCRIPTORS: DESCRIPTORS: ACHING
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
## 2018-12-19 PROBLEM — F03.918: Status: ACTIVE | Noted: 2018-12-19

## 2018-12-19 LAB
EKG ATRIAL RATE: 326 BPM
EKG Q-T INTERVAL: 352 MS
EKG QRS DURATION: 78 MS
EKG QTC CALCULATION (BAZETT): 408 MS
EKG R AXIS: 32 DEGREES
EKG T AXIS: 98 DEGREES
EKG VENTRICULAR RATE: 81 BPM
INR BLD: 2.9
PROTHROMBIN TIME: 28.5 SEC (ref 9–11.5)

## 2018-12-19 PROCEDURE — 6370000000 HC RX 637 (ALT 250 FOR IP): Performed by: PHYSICIAN ASSISTANT

## 2018-12-19 PROCEDURE — 93005 ELECTROCARDIOGRAM TRACING: CPT

## 2018-12-19 PROCEDURE — 85610 PROTHROMBIN TIME: CPT

## 2018-12-19 PROCEDURE — 6370000000 HC RX 637 (ALT 250 FOR IP): Performed by: CLINICAL NURSE SPECIALIST

## 2018-12-19 PROCEDURE — 1240000000 HC EMOTIONAL WELLNESS R&B

## 2018-12-19 PROCEDURE — 36415 COLL VENOUS BLD VENIPUNCTURE: CPT

## 2018-12-19 RX ORDER — DIGOXIN 0.25 MG/ML
250 INJECTION INTRAMUSCULAR; INTRAVENOUS ONCE
Status: DISCONTINUED | OUTPATIENT
Start: 2018-12-19 | End: 2018-12-19

## 2018-12-19 RX ORDER — TRAZODONE HYDROCHLORIDE 50 MG/1
25 TABLET ORAL NIGHTLY PRN
Status: DISCONTINUED | OUTPATIENT
Start: 2018-12-19 | End: 2018-12-24 | Stop reason: HOSPADM

## 2018-12-19 RX ORDER — FUROSEMIDE 20 MG/1
20 TABLET ORAL DAILY
Status: DISCONTINUED | OUTPATIENT
Start: 2018-12-19 | End: 2018-12-24 | Stop reason: HOSPADM

## 2018-12-19 RX ORDER — DILTIAZEM HYDROCHLORIDE 240 MG/1
240 CAPSULE, COATED, EXTENDED RELEASE ORAL ONCE
Status: COMPLETED | OUTPATIENT
Start: 2018-12-19 | End: 2018-12-19

## 2018-12-19 RX ORDER — MEMANTINE HYDROCHLORIDE 5 MG/1
5 TABLET ORAL 2 TIMES DAILY
Status: DISCONTINUED | OUTPATIENT
Start: 2018-12-19 | End: 2018-12-24 | Stop reason: HOSPADM

## 2018-12-19 RX ORDER — QUETIAPINE FUMARATE 25 MG/1
12.5 TABLET, FILM COATED ORAL 2 TIMES DAILY
Status: DISCONTINUED | OUTPATIENT
Start: 2018-12-19 | End: 2018-12-24 | Stop reason: HOSPADM

## 2018-12-19 RX ORDER — ACETAMINOPHEN 325 MG/1
650 TABLET ORAL EVERY 4 HOURS PRN
Status: DISCONTINUED | OUTPATIENT
Start: 2018-12-19 | End: 2018-12-24 | Stop reason: HOSPADM

## 2018-12-19 RX ORDER — DULOXETIN HYDROCHLORIDE 30 MG/1
30 CAPSULE, DELAYED RELEASE ORAL NIGHTLY
Status: DISCONTINUED | OUTPATIENT
Start: 2018-12-19 | End: 2018-12-24 | Stop reason: HOSPADM

## 2018-12-19 RX ORDER — MAGNESIUM HYDROXIDE/ALUMINUM HYDROXICE/SIMETHICONE 120; 1200; 1200 MG/30ML; MG/30ML; MG/30ML
30 SUSPENSION ORAL PRN
Status: DISCONTINUED | OUTPATIENT
Start: 2018-12-19 | End: 2018-12-24 | Stop reason: HOSPADM

## 2018-12-19 RX ORDER — HALOPERIDOL 2 MG/1
2 TABLET ORAL EVERY 6 HOURS PRN
Status: DISCONTINUED | OUTPATIENT
Start: 2018-12-19 | End: 2018-12-24 | Stop reason: HOSPADM

## 2018-12-19 RX ORDER — DIGOXIN 250 MCG
250 TABLET ORAL DAILY
Status: DISCONTINUED | OUTPATIENT
Start: 2018-12-19 | End: 2018-12-24 | Stop reason: HOSPADM

## 2018-12-19 RX ORDER — BENZTROPINE MESYLATE 1 MG/ML
2 INJECTION INTRAMUSCULAR; INTRAVENOUS 2 TIMES DAILY PRN
Status: DISCONTINUED | OUTPATIENT
Start: 2018-12-19 | End: 2018-12-24 | Stop reason: HOSPADM

## 2018-12-19 RX ORDER — MEMANTINE HYDROCHLORIDE 5 MG/1
5 TABLET ORAL DAILY
Status: DISCONTINUED | OUTPATIENT
Start: 2018-12-19 | End: 2018-12-20 | Stop reason: DRUGHIGH

## 2018-12-19 RX ORDER — DIGOXIN 250 MCG
125 TABLET ORAL DAILY
COMMUNITY

## 2018-12-19 RX ORDER — HALOPERIDOL 5 MG/ML
2 INJECTION INTRAMUSCULAR EVERY 6 HOURS PRN
Status: DISCONTINUED | OUTPATIENT
Start: 2018-12-19 | End: 2018-12-24 | Stop reason: HOSPADM

## 2018-12-19 RX ORDER — WARFARIN SODIUM 3 MG/1
3 TABLET ORAL ONCE
Status: COMPLETED | OUTPATIENT
Start: 2018-12-19 | End: 2018-12-19

## 2018-12-19 RX ORDER — HYDROXYZINE PAMOATE 25 MG/1
25 CAPSULE ORAL EVERY 6 HOURS PRN
Status: DISCONTINUED | OUTPATIENT
Start: 2018-12-19 | End: 2018-12-24 | Stop reason: HOSPADM

## 2018-12-19 RX ORDER — QUETIAPINE FUMARATE 25 MG/1
12.5 TABLET, FILM COATED ORAL ONCE
Status: COMPLETED | OUTPATIENT
Start: 2018-12-19 | End: 2018-12-19

## 2018-12-19 RX ADMIN — DIGOXIN 250 MCG: 0.25 TABLET ORAL at 11:37

## 2018-12-19 RX ADMIN — HYDROXYZINE PAMOATE 25 MG: 25 CAPSULE ORAL at 21:31

## 2018-12-19 RX ADMIN — DILTIAZEM HYDROCHLORIDE 240 MG: 240 CAPSULE, COATED, EXTENDED RELEASE ORAL at 11:08

## 2018-12-19 RX ADMIN — MEMANTINE HYDROCHLORIDE 5 MG: 5 TABLET ORAL at 11:08

## 2018-12-19 RX ADMIN — TRAZODONE HYDROCHLORIDE 25 MG: 50 TABLET ORAL at 21:41

## 2018-12-19 RX ADMIN — QUETIAPINE FUMARATE 12.5 MG: 25 TABLET ORAL at 11:23

## 2018-12-19 RX ADMIN — QUETIAPINE FUMARATE 12.5 MG: 25 TABLET ORAL at 21:32

## 2018-12-19 RX ADMIN — MEMANTINE HYDROCHLORIDE 5 MG: 5 TABLET ORAL at 21:32

## 2018-12-19 RX ADMIN — FUROSEMIDE 20 MG: 20 TABLET ORAL at 11:08

## 2018-12-19 RX ADMIN — WARFARIN SODIUM 3 MG: 3 TABLET ORAL at 11:08

## 2018-12-19 RX ADMIN — DULOXETINE HYDROCHLORIDE 30 MG: 30 CAPSULE, DELAYED RELEASE ORAL at 21:31

## 2018-12-19 ASSESSMENT — SLEEP AND FATIGUE QUESTIONNAIRES
SLEEP PATTERN: NORMAL
DO YOU HAVE DIFFICULTY SLEEPING: NO
AVERAGE NUMBER OF SLEEP HOURS: 8
DO YOU USE A SLEEP AID: NO

## 2018-12-19 ASSESSMENT — LIFESTYLE VARIABLES: HISTORY_ALCOHOL_USE: NO

## 2018-12-19 ASSESSMENT — PATIENT HEALTH QUESTIONNAIRE - PHQ9: SUM OF ALL RESPONSES TO PHQ QUESTIONS 1-9: 2

## 2018-12-19 NOTE — ED NOTES
Pt medicated as ordered, waiting for cymbalta and namenda from pharmacy.      Moncho Mathew LPN  86/56/65 8730

## 2018-12-19 NOTE — ED PROVIDER NOTES
MEDICATIONS       Previous Medications    DIGOXIN (LANOXIN) 125 MCG TABLET    Take 0.25 mg by mouth daily Take 0.5 tab daily    DILTIAZEM (CARDIZEM CD) 120 MG EXTENDED RELEASE CAPSULE    Take 240 mg by mouth daily     DULOXETINE (CYMBALTA) 30 MG EXTENDED RELEASE CAPSULE    Take 30 mg by mouth nightly     FAMOTIDINE (PEPCID) 20 MG TABLET    TAKE 1 TABLET BY MOUTH TWICE DAILY    FLUTICASONE (FLONASE) 50 MCG/ACT NASAL SPRAY    2 sprays by Each Nare route 2 times daily as needed     FUROSEMIDE (LASIX) 20 MG TABLET    Take 20 mg by mouth See Admin Instructions 3 times per week Mon, Wed, Fri    LIDOCAINE (LIDODERM) 5 %    Place 1 patch onto the skin daily 12 hours on, 12 hours off. MEMANTINE (NAMENDA) 5 MG TABLET    Take 1 tablet by mouth 2 times daily    METOPROLOL SUCCINATE (TOPROL XL) 100 MG EXTENDED RELEASE TABLET    Take 1 tablet by mouth daily    QUETIAPINE (SEROQUEL) 25 MG TABLET    Take 0.5 tablets by mouth 3 times daily    WARFARIN (COUMADIN) 6 MG TABLET    TAKE AS DIRECTED TO MAINTAIN INR       ALLERGIES     Donepezil and Codeine    FAMILY HISTORY     No family history on file. SOCIAL HISTORY       Social History     Social History    Marital status:      Spouse name: N/A    Number of children: N/A    Years of education: N/A     Social History Main Topics    Smoking status: Never Smoker    Smokeless tobacco: Never Used    Alcohol use No    Drug use: No    Sexual activity: Not on file     Other Topics Concern    Not on file     Social History Narrative    No narrative on file         PHYSICAL EXAM         ED Triage Vitals [12/18/18 1808]   BP Temp Temp Source Pulse Resp SpO2 Height Weight   (!) 163/107 97.9 °F (36.6 °C) Oral 121 20 97 % 5' 2\" (1.575 m) 157 lb (71.2 kg)       Physical Exam   Constitutional: She is oriented to person, place, and time. She appears well-developed and well-nourished. HENT:   Head: Normocephalic and atraumatic.    Mouth/Throat: Oropharynx is clear and moist. Glucose, Ur 500 (*)     Ketones, Urine TRACE (*)     All other components within normal limits   SALICYLATE LEVEL - Abnormal; Notable for the following:     Salicylate, Serum <5.6 (*)     All other components within normal limits   ACETAMINOPHEN LEVEL - Abnormal; Notable for the following:     Acetaminophen Level <5 (*)     All other components within normal limits   CKMB & RELATIVE PERCENT - Abnormal; Notable for the following:     CK-MB 3.9 (*)     All other components within normal limits   ETHANOL   URINE DRUG SCREEN   TSH WITHOUT REFLEX   PROTIME-INR       All other labs were within normal range or not returned as of this dictation. EMERGENCY DEPARTMENT COURSE and DIFFERENTIAL DIAGNOSIS/MDM:   Vitals:    Vitals:    12/18/18 1808 12/18/18 1813   BP: (!) 163/107 (!) 149/89   Pulse: 121 118   Resp: 20 20   Temp: 97.9 °F (36.6 °C)    TempSrc: Oral    SpO2: 97%    Weight: 157 lb (71.2 kg)    Height: 5' 2\" (1.575 m)          MDM    Medically cleared. CRITICAL CARE TIME   Total Critical Caretime was 0 minutes, excluding separately reportable procedures. There was a high probability of clinically significant/life threatening deterioration in the patient's condition which required my urgent intervention. Procedures    FINAL IMPRESSION      1. Dementia without behavioral disturbance, unspecified dementia type          DISPOSITION/PLAN   DISPOSITION        PATIENT REFERRED TO:  No follow-up provider specified. DISCHARGE MEDICATIONS:  New Prescriptions    No medications on file          (Please notethat portions of this note were completed with a voice recognition program.  Efforts were made to edit the dictations but occasionally words are mis-transcribed. )    Philippa Schaumann, PA (electronically signed)  Emergency Physician Assistant          Ivan Hdez [place]  12/19/18 1662
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
## 2018-12-20 PROBLEM — F22 DELUSIONAL DISORDER (HCC): Status: ACTIVE | Noted: 2018-12-20

## 2018-12-20 LAB
INR BLD: 3.3
PROTHROMBIN TIME: 32 SEC (ref 9–11.5)

## 2018-12-20 PROCEDURE — 36415 COLL VENOUS BLD VENIPUNCTURE: CPT

## 2018-12-20 PROCEDURE — 1240000000 HC EMOTIONAL WELLNESS R&B

## 2018-12-20 PROCEDURE — 6370000000 HC RX 637 (ALT 250 FOR IP): Performed by: INTERNAL MEDICINE

## 2018-12-20 PROCEDURE — 99222 1ST HOSP IP/OBS MODERATE 55: CPT | Performed by: INTERNAL MEDICINE

## 2018-12-20 PROCEDURE — 6370000000 HC RX 637 (ALT 250 FOR IP): Performed by: PHYSICIAN ASSISTANT

## 2018-12-20 PROCEDURE — 6370000000 HC RX 637 (ALT 250 FOR IP): Performed by: CLINICAL NURSE SPECIALIST

## 2018-12-20 PROCEDURE — 99223 1ST HOSP IP/OBS HIGH 75: CPT | Performed by: PSYCHIATRY & NEUROLOGY

## 2018-12-20 PROCEDURE — 85610 PROTHROMBIN TIME: CPT

## 2018-12-20 RX ORDER — METOPROLOL SUCCINATE 100 MG/1
100 TABLET, EXTENDED RELEASE ORAL DAILY
Status: DISCONTINUED | OUTPATIENT
Start: 2018-12-20 | End: 2018-12-21

## 2018-12-20 RX ORDER — FLUTICASONE PROPIONATE 50 MCG
2 SPRAY, SUSPENSION (ML) NASAL DAILY
Status: DISCONTINUED | OUTPATIENT
Start: 2018-12-20 | End: 2018-12-24 | Stop reason: HOSPADM

## 2018-12-20 RX ORDER — WARFARIN SODIUM 3 MG/1
3 TABLET ORAL
Status: DISCONTINUED | OUTPATIENT
Start: 2018-12-21 | End: 2018-12-21

## 2018-12-20 RX ORDER — WARFARIN SODIUM 3 MG/1
6 TABLET ORAL
Status: DISCONTINUED | OUTPATIENT
Start: 2018-12-20 | End: 2018-12-21

## 2018-12-20 RX ORDER — WARFARIN SODIUM 3 MG/1
1.5 TABLET ORAL
Status: COMPLETED | OUTPATIENT
Start: 2018-12-20 | End: 2018-12-20

## 2018-12-20 RX ADMIN — MEMANTINE HYDROCHLORIDE 5 MG: 5 TABLET ORAL at 08:32

## 2018-12-20 RX ADMIN — MEMANTINE HYDROCHLORIDE 5 MG: 5 TABLET ORAL at 20:32

## 2018-12-20 RX ADMIN — WARFARIN SODIUM 1.5 MG: 3 TABLET ORAL at 17:29

## 2018-12-20 RX ADMIN — DULOXETINE HYDROCHLORIDE 30 MG: 30 CAPSULE, DELAYED RELEASE ORAL at 20:32

## 2018-12-20 RX ADMIN — FUROSEMIDE 20 MG: 20 TABLET ORAL at 08:31

## 2018-12-20 RX ADMIN — QUETIAPINE FUMARATE 12.5 MG: 25 TABLET ORAL at 08:32

## 2018-12-20 RX ADMIN — FLUTICASONE PROPIONATE 2 SPRAY: 50 SPRAY, METERED NASAL at 20:37

## 2018-12-20 RX ADMIN — ACETAMINOPHEN 650 MG: 325 TABLET ORAL at 11:02

## 2018-12-20 RX ADMIN — WARFARIN SODIUM 6 MG: 3 TABLET ORAL at 17:28

## 2018-12-20 RX ADMIN — METOPROLOL SUCCINATE 100 MG: 100 TABLET, EXTENDED RELEASE ORAL at 11:02

## 2018-12-20 RX ADMIN — TRAZODONE HYDROCHLORIDE 25 MG: 50 TABLET ORAL at 20:33

## 2018-12-20 RX ADMIN — QUETIAPINE FUMARATE 12.5 MG: 25 TABLET ORAL at 20:33

## 2018-12-20 RX ADMIN — DIGOXIN 250 MCG: 0.25 TABLET ORAL at 08:31

## 2018-12-20 ASSESSMENT — ENCOUNTER SYMPTOMS
RESPIRATORY NEGATIVE: 1
COUGH: 0
NAUSEA: 0
STRIDOR: 0
GASTROINTESTINAL NEGATIVE: 1
BLOOD IN STOOL: 0
EYES NEGATIVE: 1
SHORTNESS OF BREATH: 0
CHEST TIGHTNESS: 0
WHEEZING: 0

## 2018-12-20 ASSESSMENT — PAIN SCALES - GENERAL: PAINLEVEL_OUTOF10: 3
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
## 2018-12-20 NOTE — CONSULTS
Consults    Patient Name: Rosemarie Dial Date: 2018  5:26 PM  MR #: [id number]  : 1934    Attending Physician: Nancy Siddiqui MD  Reason for consult: af    History of Presenting Illness:      Teresita Scott is a 80 y.o. female on hospital day 1 with a history of . History Obtained From:  patient, electronic medical record    Pt admitted with depression and anxiety. She feels here  is out to hurt her. She has Chronic AF on Warfarin and rate control. Sees Dr. Nevarez Feeling regularly. She is nervous now. Her HR is fast 100. She can sense HR. She did not yet receive any of her CV meds this am    She has no cp no sob. No falls no bleed. LVEF 55% by echo at [facility]  History:      EKG:  Past Medical History:   Diagnosis Date    Anxiety     Atrial fibrillation (HCC)     Atrial fibrillation (HCC)     Atrial fibrillation (HCC)     Chronic back pain     Dementia     Depression     Hypertension      Past Surgical History:   Procedure Laterality Date    COLONOSCOPY  09    DR Manuel Garcia    HYSTERECTOMY      SMALL INTESTINE SURGERY      PARTIAL    UPPER GASTROINTESTINAL ENDOSCOPY  09    DR DONALD       Family History  No family history on file. [] Unable to obtain due to ventilated and/ or neurologic status    Social History     Social History    Marital status:      Spouse name: N/A    Number of children: N/A    Years of education: N/A     Occupational History    Not on file.      Social History Main Topics    Smoking status: Never Smoker    Smokeless tobacco: Never Used    Alcohol use No    Drug use: No    Sexual activity: Not on file     Other Topics Concern    Not on file     Social History Narrative    No narrative on file      [] Unable to obtain due to ventilated and/ or neurologic status      Home Medications:      Prescriptions Prior to Admission: digoxin (LANOXIN) 250 MCG tablet, Take 250 mcg by mouth daily To = 0.25mg  furosemide (LASIX) 20 MG
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
## 2018-12-21 ENCOUNTER — APPOINTMENT (OUTPATIENT)
Dept: GENERAL RADIOLOGY | Age: 83
DRG: 884 | End: 2018-12-21
Payer: MEDICARE

## 2018-12-21 LAB
INR BLD: 3.6
PROTHROMBIN TIME: 35.2 SEC (ref 9–11.5)

## 2018-12-21 PROCEDURE — 73090 X-RAY EXAM OF FOREARM: CPT

## 2018-12-21 PROCEDURE — 36415 COLL VENOUS BLD VENIPUNCTURE: CPT

## 2018-12-21 PROCEDURE — 1240000000 HC EMOTIONAL WELLNESS R&B

## 2018-12-21 PROCEDURE — 73110 X-RAY EXAM OF WRIST: CPT

## 2018-12-21 PROCEDURE — 85610 PROTHROMBIN TIME: CPT

## 2018-12-21 PROCEDURE — 99233 SBSQ HOSP IP/OBS HIGH 50: CPT | Performed by: PSYCHIATRY & NEUROLOGY

## 2018-12-21 PROCEDURE — 6370000000 HC RX 637 (ALT 250 FOR IP): Performed by: INTERNAL MEDICINE

## 2018-12-21 PROCEDURE — 93010 ELECTROCARDIOGRAM REPORT: CPT | Performed by: INTERNAL MEDICINE

## 2018-12-21 PROCEDURE — 6370000000 HC RX 637 (ALT 250 FOR IP): Performed by: CLINICAL NURSE SPECIALIST

## 2018-12-21 PROCEDURE — 99232 SBSQ HOSP IP/OBS MODERATE 35: CPT | Performed by: INTERNAL MEDICINE

## 2018-12-21 PROCEDURE — 6370000000 HC RX 637 (ALT 250 FOR IP): Performed by: PHYSICIAN ASSISTANT

## 2018-12-21 RX ORDER — IBUPROFEN 600 MG/1
600 TABLET ORAL EVERY 6 HOURS PRN
Status: DISCONTINUED | OUTPATIENT
Start: 2018-12-21 | End: 2018-12-21

## 2018-12-21 RX ORDER — METOPROLOL SUCCINATE 100 MG/1
100 TABLET, EXTENDED RELEASE ORAL 2 TIMES DAILY
Status: DISCONTINUED | OUTPATIENT
Start: 2018-12-21 | End: 2018-12-24 | Stop reason: HOSPADM

## 2018-12-21 RX ORDER — DILTIAZEM HYDROCHLORIDE 120 MG/1
120 CAPSULE, COATED, EXTENDED RELEASE ORAL DAILY
Status: DISCONTINUED | OUTPATIENT
Start: 2018-12-21 | End: 2018-12-24 | Stop reason: HOSPADM

## 2018-12-21 RX ADMIN — ACETAMINOPHEN 650 MG: 325 TABLET ORAL at 12:39

## 2018-12-21 RX ADMIN — DULOXETINE HYDROCHLORIDE 30 MG: 30 CAPSULE, DELAYED RELEASE ORAL at 20:46

## 2018-12-21 RX ADMIN — QUETIAPINE FUMARATE 12.5 MG: 25 TABLET ORAL at 20:47

## 2018-12-21 RX ADMIN — DILTIAZEM HYDROCHLORIDE 120 MG: 120 CAPSULE, COATED, EXTENDED RELEASE ORAL at 12:44

## 2018-12-21 RX ADMIN — ACETAMINOPHEN 650 MG: 325 TABLET ORAL at 08:39

## 2018-12-21 RX ADMIN — DIGOXIN 250 MCG: 0.25 TABLET ORAL at 08:39

## 2018-12-21 RX ADMIN — FUROSEMIDE 20 MG: 20 TABLET ORAL at 08:39

## 2018-12-21 RX ADMIN — METOPROLOL SUCCINATE 100 MG: 100 TABLET, EXTENDED RELEASE ORAL at 12:44

## 2018-12-21 RX ADMIN — QUETIAPINE FUMARATE 12.5 MG: 25 TABLET ORAL at 08:39

## 2018-12-21 RX ADMIN — METOPROLOL SUCCINATE 100 MG: 100 TABLET, EXTENDED RELEASE ORAL at 20:46

## 2018-12-21 RX ADMIN — MEMANTINE HYDROCHLORIDE 5 MG: 5 TABLET ORAL at 08:39

## 2018-12-21 RX ADMIN — MEMANTINE HYDROCHLORIDE 5 MG: 5 TABLET ORAL at 20:47

## 2018-12-21 RX ADMIN — FLUTICASONE PROPIONATE 2 SPRAY: 50 SPRAY, METERED NASAL at 08:40

## 2018-12-21 RX ADMIN — TRAZODONE HYDROCHLORIDE 25 MG: 50 TABLET ORAL at 20:46

## 2018-12-21 RX ADMIN — ACETAMINOPHEN 650 MG: 325 TABLET ORAL at 18:56

## 2018-12-21 ASSESSMENT — PAIN SCALES - GENERAL
PAINLEVEL_OUTOF10: 4
PAINLEVEL_OUTOF10: 4
PAINLEVEL_OUTOF10: 5
PAINLEVEL_OUTOF10: 5

## 2018-12-21 ASSESSMENT — PAIN DESCRIPTION - DESCRIPTORS: DESCRIPTORS: DISCOMFORT

## 2018-12-21 ASSESSMENT — PAIN DESCRIPTION - LOCATION: LOCATION: WRIST

## 2018-12-21 NOTE — PROGRESS NOTES
Out on unit, social. Calm, cooperative and seems more comfortable being here today compared to yesterday. Pt stated how nice she thought the nubia party and gift she got yesterday. Pt was helped with a shower this morning. Cooperative, eating, sleeping and going to group.

## 2018-12-21 NOTE — PLAN OF CARE
Problem: Falls - Risk of:  Goal: Will remain free from falls  Will remain free from falls   Outcome: Ongoing    Goal: Absence of physical injury  Absence of physical injury   Outcome: Ongoing      Problem: Altered Mood, Manic Behavior:  Goal: Mood stable  Mood stable   Outcome: Ongoing

## 2018-12-21 NOTE — CARE COORDINATION
Patient did not attend group despite staff encouragement.   Electronically signed by Erica Falcon on 12/21/2018 at 12:57 PM

## 2018-12-22 PROCEDURE — 6370000000 HC RX 637 (ALT 250 FOR IP): Performed by: PHYSICIAN ASSISTANT

## 2018-12-22 PROCEDURE — 6370000000 HC RX 637 (ALT 250 FOR IP): Performed by: CLINICAL NURSE SPECIALIST

## 2018-12-22 PROCEDURE — 6370000000 HC RX 637 (ALT 250 FOR IP): Performed by: INTERNAL MEDICINE

## 2018-12-22 PROCEDURE — 1240000000 HC EMOTIONAL WELLNESS R&B

## 2018-12-22 RX ORDER — WARFARIN SODIUM 3 MG/1
1.5 TABLET ORAL
Status: COMPLETED | OUTPATIENT
Start: 2018-12-22 | End: 2018-12-22

## 2018-12-22 RX ADMIN — TRAZODONE HYDROCHLORIDE 25 MG: 50 TABLET ORAL at 20:47

## 2018-12-22 RX ADMIN — ACETAMINOPHEN 650 MG: 325 TABLET ORAL at 14:11

## 2018-12-22 RX ADMIN — METOPROLOL SUCCINATE 100 MG: 100 TABLET, EXTENDED RELEASE ORAL at 08:47

## 2018-12-22 RX ADMIN — FUROSEMIDE 20 MG: 20 TABLET ORAL at 08:47

## 2018-12-22 RX ADMIN — QUETIAPINE FUMARATE 12.5 MG: 25 TABLET ORAL at 20:47

## 2018-12-22 RX ADMIN — METOPROLOL SUCCINATE 100 MG: 100 TABLET, EXTENDED RELEASE ORAL at 20:47

## 2018-12-22 RX ADMIN — QUETIAPINE FUMARATE 12.5 MG: 25 TABLET ORAL at 08:47

## 2018-12-22 RX ADMIN — MEMANTINE HYDROCHLORIDE 5 MG: 5 TABLET ORAL at 08:47

## 2018-12-22 RX ADMIN — DILTIAZEM HYDROCHLORIDE 120 MG: 120 CAPSULE, COATED, EXTENDED RELEASE ORAL at 08:46

## 2018-12-22 RX ADMIN — DIGOXIN 250 MCG: 0.25 TABLET ORAL at 08:47

## 2018-12-22 RX ADMIN — WARFARIN SODIUM 1.5 MG: 3 TABLET ORAL at 18:11

## 2018-12-22 RX ADMIN — DULOXETINE HYDROCHLORIDE 30 MG: 30 CAPSULE, DELAYED RELEASE ORAL at 20:47

## 2018-12-22 RX ADMIN — MEMANTINE HYDROCHLORIDE 5 MG: 5 TABLET ORAL at 20:47

## 2018-12-22 ASSESSMENT — PAIN SCALES - GENERAL: PAINLEVEL_OUTOF10: 6

## 2018-12-22 NOTE — PROGRESS NOTES
BEHAVIORAL HEALTH FOLLOW-UP NOTE     12/22/18     Patient was seen and examined in person, Chart reviewed   Patient's case discussed with staff/team    Chief Complaint: dementia paranoid    Interim History:     Pt doing better  Less paranoid  Has got explanation for all the questions that her  is raising about her though with delusional content at times  Overall getting better  Wanting to go home by Christiane Mendoza   has been visiting her and supportive  Appetite:   [x] Normal/Unchanged  [] Increased  [] Decreased      Sleep:       [] Normal/Unchanged  [x] Fair       [] Poor              Energy:    [] Normal/Unchanged  [] Increased  [x] Decreased        SI [] Present  [x] Absent    HI  []Present  [x] Absent     Aggression:  [] yes  [] no    Patient is [] able  [] unable to CONTRACT FOR SAFETY     PAST MEDICAL/PSYCHIATRIC HISTORY:   Past Medical History:   Diagnosis Date    Anxiety     [facility]     [facility]rial fibrillation ([facility]     Atrial fibrillation ([facility] [facility]     Chronic back pain     Dementia     Depression     Hypertension        FAMILY/SOCIAL HISTORY:  History reviewed. No pertinent family history. Social History     Social History    Marital status:      Spouse name: N/A    Number of children: N/A    Years of education: N/A     Occupational History    Not on file. Social History Main Topics    Smoking status: Never Smoker    Smokeless tobacco: Never Used    Alcohol use No    Drug use: No    Sexual activity: Not on file     Other Topics Concern    Not on file     Social History Narrative    No narrative on file           ROS:  [x] All negative/unchanged except if checked.  Explain positive(checked items) below:  [] Constitutional  [] Eyes  [] Ear/Nose/Mouth/Throat  [] Respiratory  [] CV  [] GI  []   [] Musculoskeletal  [] Skin/Breast  [] Neurological  [] Endocrine  [] Heme/Lymph  [] Allergic/Immunologic    Explanation:     MEDICATIONS:    Current Facility-Administered (!) 141/82   Pulse 82   Temp 97 °F (36.1 °C)   Resp 16   Ht 5' 2\" (1.575 m)   Wt 157 lb (71.2 kg)   LMP 01/01/1984   SpO2 96%   BMI 28.72 kg/m²   Gait - steady  Medication side effects(SE): no    Mental Status Examination:    Level of consciousness:  within normal limits   Appearance:  fair grooming and fair hygiene  Behavior/Motor:  psychomotor retardation  Attitude toward examiner:  cooperative  Speech:  slow   Mood: anxious  Affect:  anxious  Thought processes:  slow   Thought content:  Delusions:  paranoid  Cognition:  oriented to person, place, and time   Concentration poor  Insight poor   Judgement poor     ASSESSMENT:   Patient symptoms are:  [] Well controlled  [x] Improving  [] Worsening  [] No change      Diagnosis:   Principal Problem:    Neurodegenerative dementia with behavioral disturbance  Active Problems:    Delusional disorder ([facility] [address]  Resolved Problems:    * No resolved hospital problems. *      LABS:    No results for input(s): WBC, HGB, PLT in the last 72 hours. No results for input(s): NA, K, CL, CO2, BUN, CREATININE, GLUCOSE in the last 72 hours. No results for input(s): BILITOT, ALKPHOS, AST, ALT in the last 72 hours. Lab Results   Component Value Date    LABAMPH Neg 12/18/2018    BARBSCNU Neg 12/18/2018    LABBENZ Neg 12/18/2018    OPIATESCREENURINE Neg 12/18/2018    PHENCYCLIDINESCREENURINE Neg 12/18/2018    ETOH <10 12/18/2018     Lab Results   Component Value Date    TSH 1.310 12/18/2018     No results found for: LITHIUM  Lab Results   Component Value Date    VALPROATE 77.6 03/27/2018         Treatment Plan:  Reviewed current Medications with the patient. Meds as ordered  Risks, benefits, side effects, drug-to-drug interactions and alternatives to treatment were discussed. Collateral information: reviewed  CD evaluation  Encourage patient to attend group and other milieu activities.   Discharge planning discussed with the patient and treatment team.    PSYCHOTHERAPY/COUNSELING:  [x] Therapeutic interview  [x] Supportive  [] CBT  [] Ongoing  [] Other    [x] Patient continues to need, on a daily basis, active treatment furnished directly by or requiring the supervision of inpatient psychiatric personnel      Anticipated Length of stay:            Electronically signed by Martha Stinson MD on 12/24/2018 at 9:47 AM

## 2018-12-23 LAB
INR BLD: 2.8
PROTHROMBIN TIME: 27.7 SEC (ref 9–11.5)

## 2018-12-23 PROCEDURE — 6370000000 HC RX 637 (ALT 250 FOR IP): Performed by: CLINICAL NURSE SPECIALIST

## 2018-12-23 PROCEDURE — 85610 PROTHROMBIN TIME: CPT

## 2018-12-23 PROCEDURE — 6370000000 HC RX 637 (ALT 250 FOR IP): Performed by: PHYSICIAN ASSISTANT

## 2018-12-23 PROCEDURE — 6370000000 HC RX 637 (ALT 250 FOR IP): Performed by: INTERNAL MEDICINE

## 2018-12-23 PROCEDURE — 1240000000 HC EMOTIONAL WELLNESS R&B

## 2018-12-23 PROCEDURE — 36415 COLL VENOUS BLD VENIPUNCTURE: CPT

## 2018-12-23 RX ORDER — WARFARIN SODIUM 3 MG/1
1.5 TABLET ORAL
Status: COMPLETED | OUTPATIENT
Start: 2018-12-23 | End: 2018-12-23

## 2018-12-23 RX ADMIN — DULOXETINE HYDROCHLORIDE 30 MG: 30 CAPSULE, DELAYED RELEASE ORAL at 20:34

## 2018-12-23 RX ADMIN — ACETAMINOPHEN 650 MG: 325 TABLET ORAL at 13:16

## 2018-12-23 RX ADMIN — ACETAMINOPHEN 650 MG: 325 TABLET ORAL at 08:15

## 2018-12-23 RX ADMIN — WARFARIN SODIUM 1.5 MG: 3 TABLET ORAL at 18:27

## 2018-12-23 RX ADMIN — DILTIAZEM HYDROCHLORIDE 120 MG: 120 CAPSULE, COATED, EXTENDED RELEASE ORAL at 08:19

## 2018-12-23 RX ADMIN — MEMANTINE HYDROCHLORIDE 5 MG: 5 TABLET ORAL at 20:34

## 2018-12-23 RX ADMIN — QUETIAPINE FUMARATE 12.5 MG: 25 TABLET ORAL at 20:34

## 2018-12-23 RX ADMIN — DIGOXIN 250 MCG: 0.25 TABLET ORAL at 08:20

## 2018-12-23 RX ADMIN — ACETAMINOPHEN 650 MG: 325 TABLET ORAL at 19:42

## 2018-12-23 RX ADMIN — FUROSEMIDE 20 MG: 20 TABLET ORAL at 08:15

## 2018-12-23 RX ADMIN — QUETIAPINE FUMARATE 12.5 MG: 25 TABLET ORAL at 08:16

## 2018-12-23 RX ADMIN — METOPROLOL SUCCINATE 100 MG: 100 TABLET, EXTENDED RELEASE ORAL at 20:34

## 2018-12-23 RX ADMIN — MEMANTINE HYDROCHLORIDE 5 MG: 5 TABLET ORAL at 08:15

## 2018-12-23 RX ADMIN — METOPROLOL SUCCINATE 100 MG: 100 TABLET, EXTENDED RELEASE ORAL at 08:19

## 2018-12-23 ASSESSMENT — PAIN SCALES - GENERAL
PAINLEVEL_OUTOF10: 5
PAINLEVEL_OUTOF10: 5
PAINLEVEL_OUTOF10: 8
PAINLEVEL_OUTOF10: 2

## 2018-12-23 NOTE — PROGRESS NOTES
Pt seems confused, very worried about BP and pulse, Pt stated she was going to die here because of her AFIB. Pt attends groups and out for meals and in common areas. Pt associated with other piers with no problem.

## 2018-12-24 VITALS
HEART RATE: 82 BPM | TEMPERATURE: 97 F | BODY MASS INDEX: 28.89 KG/M2 | OXYGEN SATURATION: 96 % | DIASTOLIC BLOOD PRESSURE: 82 MMHG | SYSTOLIC BLOOD PRESSURE: 141 MMHG | RESPIRATION RATE: 16 BRPM | HEIGHT: 62 IN | WEIGHT: 157 LBS

## 2018-12-24 LAB
INR BLD: 2.2
PROTHROMBIN TIME: 21.4 SEC (ref 9–11.5)

## 2018-12-24 PROCEDURE — 85610 PROTHROMBIN TIME: CPT

## 2018-12-24 PROCEDURE — 36415 COLL VENOUS BLD VENIPUNCTURE: CPT

## 2018-12-24 PROCEDURE — 6370000000 HC RX 637 (ALT 250 FOR IP): Performed by: CLINICAL NURSE SPECIALIST

## 2018-12-24 PROCEDURE — 99239 HOSP IP/OBS DSCHRG MGMT >30: CPT | Performed by: PSYCHIATRY & NEUROLOGY

## 2018-12-24 PROCEDURE — 6370000000 HC RX 637 (ALT 250 FOR IP): Performed by: INTERNAL MEDICINE

## 2018-12-24 PROCEDURE — 6370000000 HC RX 637 (ALT 250 FOR IP): Performed by: PHYSICIAN ASSISTANT

## 2018-12-24 RX ORDER — QUETIAPINE FUMARATE 25 MG/1
12.5 TABLET, FILM COATED ORAL 2 TIMES DAILY
Qty: 30 TABLET | Refills: 1 | Status: SHIPPED | OUTPATIENT
Start: 2018-12-24

## 2018-12-24 RX ORDER — WARFARIN SODIUM 6 MG/1
6 TABLET ORAL SEE ADMIN INSTRUCTIONS
Qty: 1 TABLET | Refills: 0 | Status: SHIPPED | OUTPATIENT
Start: 2018-12-24 | End: 2022-01-01

## 2018-12-24 RX ORDER — DILTIAZEM HYDROCHLORIDE 120 MG/1
120 CAPSULE, COATED, EXTENDED RELEASE ORAL DAILY
Qty: 30 CAPSULE | Refills: 3 | Status: SHIPPED | OUTPATIENT
Start: 2018-12-24

## 2018-12-24 RX ORDER — WARFARIN SODIUM 2.5 MG/1
2.5 TABLET ORAL
Status: DISCONTINUED | OUTPATIENT
Start: 2018-12-24 | End: 2018-12-24 | Stop reason: HOSPADM

## 2018-12-24 RX ORDER — METOPROLOL SUCCINATE 100 MG/1
100 TABLET, EXTENDED RELEASE ORAL 2 TIMES DAILY
Qty: 30 TABLET | Refills: 3 | Status: SHIPPED | OUTPATIENT
Start: 2018-12-24

## 2018-12-24 RX ADMIN — DIGOXIN 250 MCG: 0.25 TABLET ORAL at 08:11

## 2018-12-24 RX ADMIN — ACETAMINOPHEN 650 MG: 325 TABLET ORAL at 08:14

## 2018-12-24 RX ADMIN — FLUTICASONE PROPIONATE 2 SPRAY: 50 SPRAY, METERED NASAL at 08:36

## 2018-12-24 RX ADMIN — METOPROLOL SUCCINATE 100 MG: 100 TABLET, EXTENDED RELEASE ORAL at 08:12

## 2018-12-24 RX ADMIN — QUETIAPINE FUMARATE 12.5 MG: 25 TABLET ORAL at 08:11

## 2018-12-24 RX ADMIN — FUROSEMIDE 20 MG: 20 TABLET ORAL at 08:11

## 2018-12-24 RX ADMIN — MEMANTINE HYDROCHLORIDE 5 MG: 5 TABLET ORAL at 08:11

## 2018-12-24 RX ADMIN — DILTIAZEM HYDROCHLORIDE 120 MG: 120 CAPSULE, COATED, EXTENDED RELEASE ORAL at 08:12

## 2018-12-24 RX ADMIN — ACETAMINOPHEN 650 MG: 325 TABLET ORAL at 12:05

## 2018-12-24 ASSESSMENT — PAIN SCALES - GENERAL
PAINLEVEL_OUTOF10: 3
PAINLEVEL_OUTOF10: 3
PAINLEVEL_OUTOF10: 6

## 2018-12-24 NOTE — CARE COORDINATION
RAKAN confirmed with Mr. Joan Jett Patient will follow up with Dr. Katie Connelly. Mr. Joan Jett said he is not interested [facility] as a follow up option and he is not interested in the [facility] since, according to  Joan Jett, Ben Pollack was not getting help from anyone\".

## 2018-12-24 NOTE — DISCHARGE SUMMARY
medications    clobetasol 0.05 % ointment  Commonly known as:  TEMOVATE     lidocaine 5 %  Commonly known as:  Isidore Curia 1-0.05 % cream  Generic drug:  clotrimazole-betamethasone     metoprolol 100 MG tablet  Commonly known as:  LOPRESSOR     NUEDEXTA 20-10 MG Caps per capsule  Generic drug:  dextromethorphan-quiNIDine     NUPLAZID 17 MG Tabs  Generic drug:  Pimavanserin Tartrate     vitamin D 2000 units Caps capsule           Where to Get Your Medications      These medications were sent to [address] [place] [address],  [address], [facility] [address]    Phone:  [phone]   · diltiazem 120 MG extended release capsule  · metoprolol succinate 100 MG extended release tablet  · QUEtiapine 25 MG tablet           TIME SPEND - 35 MINUTES TO COMPLETE THE EVALUATION, DISCHARGE SUMMARY, MEDICATION RECONCILIATION AND FOLLOW UP CARE     Remigio Lobo  12/24/2018  9:45 AM

## 2018-12-24 NOTE — PROGRESS NOTES
Clinical Pharmacy Note    Warfarin consult follow-up    Recent Labs      12/24/18   0826   INR  2.2     No results for input(s): HGB, HCT, PLT in the last 72 hours. Significant drug:drug interactions:  New warfarin drug-drug interactions: n/a  Discontinued drug-drug interactions: n/a  Other warfarin drug-drug interactions: duloxetine, trazodone, acetaminophen        Notes:  Warfarin Dose     12/19/18 2.9 3mg given in ED   12/20/18 3.3 1.5 mg   12/21/18 3.6 HOLD   12/22/18  --- 1.5 mg    12/23/18 2.8 1.5 mg    12/24/18  2.2  2.5 mg                                INR is 2.2 today which is therapeutic for target goal range 2-3. INR decreased by 0.6 since 12/23. Will increase warfarin dose today to 2.5 mg and continue to monitor closely. Daily PT/INR until stable within therapeutic range.

## 2019-03-11 ENCOUNTER — HOSPITAL ENCOUNTER (EMERGENCY)
Age: 84
Discharge: HOME | End: 2019-03-11
Attending: EMERGENCY MEDICINE
Payer: MEDICARE

## 2019-03-11 ENCOUNTER — APPOINTMENT (OUTPATIENT)
Dept: GENERAL RADIOLOGY | Age: 84
End: 2019-03-11
Payer: MEDICARE

## 2019-03-11 VITALS
TEMPERATURE: 98.2 F | HEART RATE: 102 BPM | RESPIRATION RATE: 16 BRPM | SYSTOLIC BLOOD PRESSURE: 152 MMHG | HEIGHT: 64 IN | OXYGEN SATURATION: 97 % | WEIGHT: 140 LBS | DIASTOLIC BLOOD PRESSURE: 69 MMHG | BODY MASS INDEX: 23.9 KG/M2

## 2019-03-11 DIAGNOSIS — M54.50 ACUTE RIGHT-SIDED LOW BACK PAIN WITHOUT SCIATICA: Primary | ICD-10-CM

## 2019-03-11 PROCEDURE — 6370000000 HC RX 637 (ALT 250 FOR IP): Performed by: EMERGENCY MEDICINE

## 2019-03-11 PROCEDURE — 99283 EMERGENCY DEPT VISIT LOW MDM: CPT

## 2019-03-11 PROCEDURE — 72110 X-RAY EXAM L-2 SPINE 4/>VWS: CPT

## 2019-03-11 RX ORDER — TRAMADOL HYDROCHLORIDE 50 MG/1
50 TABLET ORAL EVERY 6 HOURS PRN
Qty: 20 TABLET | Refills: 0 | Status: SHIPPED | OUTPATIENT
Start: 2019-03-11 | End: 2019-03-14

## 2019-03-11 RX ORDER — TRAMADOL HYDROCHLORIDE 50 MG/1
50 TABLET ORAL ONCE
Status: COMPLETED | OUTPATIENT
Start: 2019-03-11 | End: 2019-03-11

## 2019-03-11 RX ADMIN — TRAMADOL HYDROCHLORIDE 50 MG: 50 TABLET, FILM COATED ORAL at 14:30

## 2019-03-11 ASSESSMENT — ENCOUNTER SYMPTOMS
ABDOMINAL DISTENTION: 0
SHORTNESS OF BREATH: 0
FACIAL SWELLING: 0
EYE DISCHARGE: 0
ABDOMINAL PAIN: 0
RHINORRHEA: 0
COLOR CHANGE: 0
PHOTOPHOBIA: 0
WHEEZING: 0
VOMITING: 0
BACK PAIN: 1

## 2019-03-11 ASSESSMENT — PAIN DESCRIPTION - ORIENTATION: ORIENTATION: RIGHT

## 2019-03-11 ASSESSMENT — PAIN SCALES - GENERAL: PAINLEVEL_OUTOF10: 8

## 2019-03-11 ASSESSMENT — PAIN DESCRIPTION - LOCATION: LOCATION: HIP

## 2019-03-11 ASSESSMENT — PAIN DESCRIPTION - PAIN TYPE: TYPE: ACUTE PAIN

## 2019-04-24 ENCOUNTER — APPOINTMENT (OUTPATIENT)
Dept: GENERAL RADIOLOGY | Age: 84
End: 2019-04-24
Payer: MEDICARE

## 2019-04-24 ENCOUNTER — HOSPITAL ENCOUNTER (EMERGENCY)
Age: 84
Discharge: HOME | End: 2019-04-25
Attending: EMERGENCY MEDICINE
Payer: MEDICARE

## 2019-04-24 DIAGNOSIS — R07.9 CHEST PAIN, UNSPECIFIED TYPE: Primary | ICD-10-CM

## 2019-04-24 LAB
ALBUMIN SERPL-MCNC: 4.4 G/DL (ref 3.5–4.6)
ALP BLD-CCNC: 63 U/L (ref 40–130)
ALT SERPL-CCNC: 29 U/L (ref 0–33)
ANION GAP SERPL CALCULATED.3IONS-SCNC: 16 MEQ/L (ref 9–15)
AST SERPL-CCNC: 37 U/L (ref 0–35)
BASOPHILS ABSOLUTE: 0.1 K/UL (ref 0–0.2)
BASOPHILS RELATIVE PERCENT: 1.5 %
BILIRUB SERPL-MCNC: 0.3 MG/DL (ref 0.2–0.7)
BUN BLDV-MCNC: 12 MG/DL (ref 8–23)
CALCIUM SERPL-MCNC: 9.3 MG/DL (ref 8.5–9.9)
CHLORIDE BLD-SCNC: 94 MEQ/L (ref 95–107)
CO2: 26 MEQ/L (ref 20–31)
CREAT SERPL-MCNC: 0.75 MG/DL (ref 0.5–0.9)
EKG ATRIAL RATE: 144 BPM
EKG Q-T INTERVAL: 324 MS
EKG QRS DURATION: 82 MS
EKG QTC CALCULATION (BAZETT): 400 MS
EKG R AXIS: 20 DEGREES
EKG T AXIS: 118 DEGREES
EKG VENTRICULAR RATE: 92 BPM
EOSINOPHILS ABSOLUTE: 0.1 K/UL (ref 0–0.7)
EOSINOPHILS RELATIVE PERCENT: 1.8 %
GFR AFRICAN AMERICAN: >60
GFR NON-AFRICAN AMERICAN: >60
GLOBULIN: 2.6 G/DL (ref 2.3–3.5)
GLUCOSE BLD-MCNC: 163 MG/DL (ref 70–99)
HCT VFR BLD CALC: 44.3 % (ref 37–47)
HEMOGLOBIN: 14.8 G/DL (ref 12–16)
INR BLD: 2.5
LYMPHOCYTES ABSOLUTE: 1.5 K/UL (ref 1–4.8)
LYMPHOCYTES RELATIVE PERCENT: 25.2 %
MCH RBC QN AUTO: 29.4 PG (ref 27–31.3)
MCHC RBC AUTO-ENTMCNC: 33.4 % (ref 33–37)
MCV RBC AUTO: 88.2 FL (ref 82–100)
MONOCYTES ABSOLUTE: 0.6 K/UL (ref 0.2–0.8)
MONOCYTES RELATIVE PERCENT: 9.5 %
NEUTROPHILS ABSOLUTE: 3.8 K/UL (ref 1.4–6.5)
NEUTROPHILS RELATIVE PERCENT: 62 %
PDW BLD-RTO: 13.9 % (ref 11.5–14.5)
PLATELET # BLD: 142 K/UL (ref 130–400)
POTASSIUM SERPL-SCNC: 4.3 MEQ/L (ref 3.4–4.9)
PROTHROMBIN TIME: 24.5 SEC (ref 9–11.5)
RBC # BLD: 5.02 M/UL (ref 4.2–5.4)
SODIUM BLD-SCNC: 136 MEQ/L (ref 135–144)
TOTAL PROTEIN: 7 G/DL (ref 6.3–8)
TROPONIN: <0.01 NG/ML (ref 0–0.01)
WBC # BLD: 6.1 K/UL (ref 4.8–10.8)

## 2019-04-24 PROCEDURE — 80053 COMPREHEN METABOLIC PANEL: CPT

## 2019-04-24 PROCEDURE — 84484 ASSAY OF TROPONIN QUANT: CPT

## 2019-04-24 PROCEDURE — 85610 PROTHROMBIN TIME: CPT

## 2019-04-24 PROCEDURE — 71045 X-RAY EXAM CHEST 1 VIEW: CPT

## 2019-04-24 PROCEDURE — 99285 EMERGENCY DEPT VISIT HI MDM: CPT

## 2019-04-24 PROCEDURE — 93005 ELECTROCARDIOGRAM TRACING: CPT

## 2019-04-24 PROCEDURE — 85025 COMPLETE CBC W/AUTO DIFF WBC: CPT

## 2019-04-24 RX ORDER — LORAZEPAM 2 MG/ML
0.5 INJECTION INTRAMUSCULAR ONCE
Status: DISCONTINUED | OUTPATIENT
Start: 2019-04-24 | End: 2019-04-25 | Stop reason: HOSPADM

## 2019-04-24 ASSESSMENT — PAIN DESCRIPTION - PAIN TYPE: TYPE: ACUTE PAIN

## 2019-04-24 ASSESSMENT — ENCOUNTER SYMPTOMS
SHORTNESS OF BREATH: 0
ABDOMINAL DISTENTION: 0
SORE THROAT: 0
PHOTOPHOBIA: 0
ABDOMINAL PAIN: 0
WHEEZING: 0
COUGH: 0
EYE DISCHARGE: 0
VOMITING: 0
CHEST TIGHTNESS: 0

## 2019-04-24 ASSESSMENT — PAIN SCALES - GENERAL: PAINLEVEL_OUTOF10: 8

## 2019-04-24 ASSESSMENT — HEART SCORE: ECG: 0

## 2019-04-24 ASSESSMENT — PAIN DESCRIPTION - ORIENTATION: ORIENTATION: LEFT

## 2019-04-24 ASSESSMENT — PAIN DESCRIPTION - LOCATION: LOCATION: SHOULDER;CHEST

## 2019-04-24 ASSESSMENT — PAIN DESCRIPTION - FREQUENCY: FREQUENCY: CONTINUOUS

## 2019-04-25 VITALS
WEIGHT: 140 LBS | RESPIRATION RATE: 21 BRPM | SYSTOLIC BLOOD PRESSURE: 147 MMHG | BODY MASS INDEX: 23.9 KG/M2 | DIASTOLIC BLOOD PRESSURE: 94 MMHG | OXYGEN SATURATION: 98 % | HEIGHT: 64 IN | HEART RATE: 82 BPM | TEMPERATURE: 98.2 F

## 2019-04-25 NOTE — ED PROVIDER NOTES
[facility] [facility] ED  eMERGENCY dEPARTMENT eNCOUnter      Pt Name: Hamilton Burrell  MRN: [id number]  [place] 1934  Date of evaluation: 4/24/2019  Provider: Norm Bocanegra MD    CHIEF COMPLAINT       Chief Complaint   Patient presents with    Chest Pain     onset yesterday- hx. of afib    Shortness of Breath         HISTORY OF PRESENT ILLNESS   (Location/Symptom, Timing/Onset,Context/Setting, Quality, Duration, Modifying Factors, Severity)  Note limiting factors. Hamilton Burrell is a 80 y.o. female who presents to the emergency department for evaluation of chest pain onset 24 hours ago. Patient describes chest pain and dyspnea on exertion for the past 12 hours. Actually the chest pain only occurs with activity and then seems to go away rest.  She does have associated shortness of breath as mentioned. No related abdominal pain. No nausea or vomiting. No diaphoresis. Patient does have a history of atrial fibrillation on Coumadin. She sees a cardiologist from the [facility]. She is currently pain-free     HPI    NursingNotes were reviewed. REVIEW OF SYSTEMS    (2-9 systems for level 4, 10 or more for level 5)     Review of Systems   Constitutional: Negative for chills and diaphoresis. HENT: Negative for congestion, ear pain, mouth sores and sore throat. Eyes: Negative for photophobia and discharge. Respiratory: Negative for cough, chest tightness, shortness of breath and wheezing. Cardiovascular: Positive for chest pain and palpitations. Gastrointestinal: Negative for abdominal distention, abdominal pain and vomiting. Endocrine: Negative for cold intolerance. Genitourinary: Negative for difficulty urinating. Musculoskeletal: Negative for arthralgias, myalgias and neck pain. Skin: Negative for pallor and rash. Allergic/Immunologic: Negative for immunocompromised state. Neurological: Negative for dizziness and syncope. Hematological: Negative for adenopathy. Psychiatric/Behavioral: Negative for agitation and hallucinations. The patient is not nervous/anxious. All other systems reviewed and are negative. Except as noted above the remainder of the review of systems was reviewed and negative. PAST MEDICAL HISTORY     Past Medical History:   Diagnosis Date    Anxiety     Atrial fibrillation ([address]     Atrial fibrillation ([address]     Atrial fibrillation (HCC)     Chronic back pain     Dementia     Depression     Hypertension          SURGICALHISTORY       Past Surgical History:   Procedure Laterality Date    COLONOSCOPY  11/18/09    DR Kwadwo Chapman    HYSTERECTOMY      SMALL INTESTINE SURGERY      PARTIAL    UPPER GASTROINTESTINAL ENDOSCOPY  5/20/09    DR DONALD         CURRENT MEDICATIONS       Previous Medications    DIGOXIN (LANOXIN) 250 MCG TABLET    Take 250 mcg by mouth daily To = 0.25mg    DILTIAZEM (CARDIZEM CD) 120 MG EXTENDED RELEASE CAPSULE    Take 1 capsule by mouth daily    DULOXETINE (CYMBALTA) 30 MG EXTENDED RELEASE CAPSULE    Take 30 mg by mouth nightly     FAMOTIDINE (PEPCID) 20 MG TABLET    TAKE 1 TABLET BY MOUTH TWICE DAILY    FLUTICASONE (FLONASE) 50 MCG/ACT NASAL SPRAY    2 sprays by Each Nare route 2 times daily as needed     FUROSEMIDE (LASIX) 20 MG TABLET    Take 20 mg by mouth See Admin Instructions 3 times per week Mon, Wed, Fri    MEMANTINE (NAMENDA) 5 MG TABLET    Take 1 tablet by mouth 2 times daily    METOPROLOL SUCCINATE (TOPROL XL) 100 MG EXTENDED RELEASE TABLET    Take 1 tablet by mouth 2 times daily    QUETIAPINE (SEROQUEL) 25 MG TABLET    Take 0.5 tablets by mouth 2 times daily    WARFARIN (COUMADIN) 6 MG TABLET    Take 1 tablet by mouth See Admin Instructions ( INR 3.1 on 12/11/18)  TAKE 6 MG Tues, Thurs, Sat and TAKE 3 MG Sun, Mon, Wed, Fri  NEXT INR BLOOD DRAW 12/26/18       ALLERGIES     Donepezil and Codeine    FAMILY HISTORY     History reviewed. No pertinent family history.        SOCIAL HISTORY       Social History     Socioeconomic History    Marital status:      Spouse name: None    Number of children: None    Years of education: None    Highest education level: None   Occupational History    None   Social Needs    Financial resource strain: None    Food insecurity:     Worry: None     Inability: None    Transportation needs:     Medical: None     Non-medical: None   Tobacco Use    Smoking status: Never Smoker    Smokeless tobacco: Never Used   Substance and Sexual Activity    Alcohol use: No    Drug use: No    Sexual activity: Not Currently   Lifestyle    Physical activity:     Days per week: None     Minutes per session: None    Stress: None   Relationships    Social connections:     Talks on phone: None     Gets together: None     Attends Jew service: None     Active member of club or organization: None     Attends meetings of clubs or organizations: None     Relationship status: None    Intimate partner violence:     Fear of current or ex partner: None     Emotionally abused: None     Physically abused: None     Forced sexual activity: None   Other Topics Concern    None   Social History Narrative    None       SCREENINGS      @FLOW([id number])@      PHYSICAL EXAM    (up to 7 for level 4, 8 or more for level 5)     ED Triage Vitals   BP Temp Temp Source Pulse Resp SpO2 Height Weight   04/24/19 2106 04/24/19 2109 04/24/19 2109 04/24/19 2106 04/24/19 2106 04/24/19 2106 04/24/19 2106 04/24/19 2106   (!) 157/101 98.2 °F (36.8 °C) Oral 105 20 95 % 5' 4\" (1.626 m) 140 lb (63.5 kg)       Physical Exam   Constitutional: She is oriented to person, place, and time. She appears well-developed. HENT:   Head: Normocephalic. Nose: Nose normal.   Eyes: Pupils are equal, round, and reactive to light. Conjunctivae are normal.   Neck: Normal range of motion. Neck supple. Cardiovascular: Normal rate, normal heart sounds and intact distal pulses. An irregularly irregular rhythm present. Pulmonary/Chest: Effort normal and breath sounds normal. No stridor. No respiratory distress. She has no wheezes. Abdominal: Soft. Bowel sounds are normal. There is no tenderness. There is no guarding. Musculoskeletal: Normal range of motion. Neurological: She is alert and oriented to person, place, and time. Skin: Skin is warm and dry. Capillary refill takes less than 2 seconds. Psychiatric: She has a normal mood and affect. Nursing note and vitals reviewed. DIAGNOSTIC RESULTS     EKG: All EKG's are interpreted by the Emergency Department Physician who either signs or Co-signsthis chart in the absence of a cardiologist.    EKG shows atrial fibrillation with a controlled rate of 92. There is no acute ST or T-wave changes. Normal axis. Abnormal EKG. RADIOLOGY:   Non-plain filmimages such as CT, Ultrasound and MRI are read by the radiologist. Plain radiographic images are visualized and preliminarily interpreted by the emergency physician with the below findings:    Portal chest x-ray is unremarkable. Interpretation per the Radiologist below, if available at the time ofthis note:    XR CHEST PORTABLE    (Results Pending)         ED BEDSIDE ULTRASOUND:   Performed by ED Physician - none    LABS:  Labs Reviewed   COMPREHENSIVE METABOLIC PANEL - Abnormal; Notable for the following components:       Result Value    Chloride 94 (*)     Anion Gap 16 (*)     Glucose 163 (*)     AST 37 (*)     All other components within normal limits   PROTIME-INR - Abnormal; Notable for the following components:    Protime 24.5 (*)     All other components within normal limits   CBC WITH AUTO DIFFERENTIAL   TROPONIN       All other labs were within normal range or not returned as of this dictation.     EMERGENCY DEPARTMENT COURSE and DIFFERENTIAL DIAGNOSIS/MDM:   Vitals:    Vitals:    04/24/19 2109 04/24/19 2130 04/24/19 2156 04/24/19 2200   BP:  (!) 155/101  (!) 147/94   Pulse:  97 75 84   Resp:  18 20 21   Temp: 98.2 °F (36.8 °C)      TempSrc: Oral      SpO2:  99% 98%    Weight:       Height:            MDM patient with a concerning history for possible angina. Her  thinks it's related to her anxiety. I've explained to both the patient and her  that based on her age and past history my recommendation is she be admitted for further cardiac rule out. She does not want to stay in the hospital.  He definitely does not want her in the hospital.  They have a follow-up appointment with cardiologist in one week for a Holter monitor. At this point I cannot force her to stay in the hospital.  Her workup thus far has been negative. She is told if she develops any persistent pain that lasts more than 10 minutes to return here the emergency department. He feels uncomfortable in any other way she can return here and we could consider admitting her at that time. Her INR today is therapeutic at 2.5      CONSULTS:  None    PROCEDURES:  Unless otherwise noted below, none     Procedures    FINAL IMPRESSION      1.  Chest pain, unspecified type          DISPOSITION/PLAN   DISPOSITION Decision To Discharge 04/24/2019 11:01:14 PM      PATIENT REFERRED TO:  Cardiologist            DISCHARGE MEDICATIONS:  New Prescriptions    No medications on file          (Please note that portions of this note were completed with a voice recognition program.  Efforts were made to edit the dictations but occasionally words are mis-transcribed.)    Cleo Escobedo MD (electronically signed)  Attending Emergency Physician          Cleo Escobedo MD  04/24/19 8732

## 2019-04-25 NOTE — ED NOTES
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 They do not wish for pt to have ativan at this time. Provider notified.        Jude Watson RN  04/24/19 8837

## 2019-08-26 ENCOUNTER — HOSPITAL ENCOUNTER (EMERGENCY)
Age: 84
Discharge: ANOTHER HOSPITAL | End: 2019-08-27
Payer: MEDICARE

## 2019-08-26 ENCOUNTER — APPOINTMENT (OUTPATIENT)
Dept: CT IMAGING | Age: 84
End: 2019-08-26
Payer: MEDICARE

## 2019-08-26 ENCOUNTER — APPOINTMENT (OUTPATIENT)
Dept: GENERAL RADIOLOGY | Age: 84
End: 2019-08-26
Payer: MEDICARE

## 2019-08-26 DIAGNOSIS — F03.90 DEMENTIA WITHOUT BEHAVIORAL DISTURBANCE, UNSPECIFIED DEMENTIA TYPE: Primary | ICD-10-CM

## 2019-08-26 DIAGNOSIS — F32.2 CURRENT SEVERE EPISODE OF MAJOR DEPRESSIVE DISORDER WITHOUT PSYCHOTIC FEATURES, UNSPECIFIED WHETHER RECURRENT (HCC): ICD-10-CM

## 2019-08-26 LAB
ACETAMINOPHEN LEVEL: <5 UG/ML (ref 10–30)
ALBUMIN SERPL-MCNC: 4.3 G/DL (ref 3.5–4.6)
ALP BLD-CCNC: 56 U/L (ref 40–130)
ALT SERPL-CCNC: 24 U/L (ref 0–33)
ANION GAP SERPL CALCULATED.3IONS-SCNC: 18 MEQ/L (ref 9–15)
AST SERPL-CCNC: 34 U/L (ref 0–35)
BACTERIA: NEGATIVE /HPF
BASOPHILS ABSOLUTE: 0.1 K/UL (ref 0–0.2)
BASOPHILS RELATIVE PERCENT: 1.1 %
BILIRUB SERPL-MCNC: 0.4 MG/DL (ref 0.2–0.7)
BILIRUBIN URINE: NEGATIVE
BLOOD, URINE: NEGATIVE
BUN BLDV-MCNC: 10 MG/DL (ref 8–23)
CALCIUM SERPL-MCNC: 9.1 MG/DL (ref 8.5–9.9)
CHLORIDE BLD-SCNC: 98 MEQ/L (ref 95–107)
CK MB: 9.1 NG/ML (ref 0–3.8)
CLARITY: CLEAR
CO2: 26 MEQ/L (ref 20–31)
COLOR: YELLOW
CREAT SERPL-MCNC: 0.58 MG/DL (ref 0.5–0.9)
CREATINE KINASE-MB INDEX: 5.1 % (ref 0–3.5)
EKG ATRIAL RATE: 288 BPM
EKG Q-T INTERVAL: 326 MS
EKG QRS DURATION: 82 MS
EKG QTC CALCULATION (BAZETT): 373 MS
EKG R AXIS: 12 DEGREES
EKG T AXIS: 62 DEGREES
EKG VENTRICULAR RATE: 79 BPM
EOSINOPHILS ABSOLUTE: 0.1 K/UL (ref 0–0.7)
EOSINOPHILS RELATIVE PERCENT: 1.1 %
EPITHELIAL CELLS, UA: ABNORMAL /HPF (ref 0–5)
ETHANOL PERCENT: NORMAL G/DL
ETHANOL: <10 MG/DL (ref 0–0.08)
GFR AFRICAN AMERICAN: >60
GFR NON-AFRICAN AMERICAN: >60
GLOBULIN: 3 G/DL (ref 2.3–3.5)
GLUCOSE BLD-MCNC: 106 MG/DL (ref 70–99)
GLUCOSE URINE: NEGATIVE MG/DL
HCT VFR BLD CALC: 43 % (ref 37–47)
HEMOGLOBIN: 14.3 G/DL (ref 12–16)
HYALINE CASTS: ABNORMAL /HPF (ref 0–5)
INR BLD: 2.7
KETONES, URINE: NEGATIVE MG/DL
LACTIC ACID, SEPSIS: 3.4 MMOL/L (ref 0.5–1.9)
LACTIC ACID: 3 MMOL/L (ref 0.5–2.2)
LEUKOCYTE ESTERASE, URINE: ABNORMAL
LYMPHOCYTES ABSOLUTE: 1.6 K/UL (ref 1–4.8)
LYMPHOCYTES RELATIVE PERCENT: 28.5 %
MCH RBC QN AUTO: 30.1 PG (ref 27–31.3)
MCHC RBC AUTO-ENTMCNC: 33.3 % (ref 33–37)
MCV RBC AUTO: 90.5 FL (ref 82–100)
MONOCYTES ABSOLUTE: 0.7 K/UL (ref 0.2–0.8)
MONOCYTES RELATIVE PERCENT: 11.7 %
NEUTROPHILS ABSOLUTE: 3.3 K/UL (ref 1.4–6.5)
NEUTROPHILS RELATIVE PERCENT: 57.6 %
NITRITE, URINE: NEGATIVE
PDW BLD-RTO: 13.5 % (ref 11.5–14.5)
PH UA: 7 (ref 5–9)
PLATELET # BLD: 152 K/UL (ref 130–400)
POTASSIUM SERPL-SCNC: 4 MEQ/L (ref 3.4–4.9)
PRO-BNP: 1012 PG/ML
PROTEIN UA: NEGATIVE MG/DL
PROTHROMBIN TIME: 30.1 SEC (ref 12.3–14.9)
RBC # BLD: 4.75 M/UL (ref 4.2–5.4)
RBC UA: ABNORMAL /HPF (ref 0–5)
REASON FOR REJECTION: NORMAL
REJECTED TEST: NORMAL
SALICYLATE, SERUM: <0.3 MG/DL (ref 15–30)
SODIUM BLD-SCNC: 142 MEQ/L (ref 135–144)
SPECIFIC GRAVITY UA: 1.01 (ref 1–1.03)
TOTAL CK: 180 U/L (ref 0–170)
TOTAL PROTEIN: 7.3 G/DL (ref 6.3–8)
TROPONIN: <0.01 NG/ML (ref 0–0.01)
TSH SERPL DL<=0.05 MIU/L-ACNC: 1.93 UIU/ML (ref 0.44–3.86)
URINE REFLEX TO CULTURE: YES
UROBILINOGEN, URINE: 0.2 E.U./DL
WBC # BLD: 5.7 K/UL (ref 4.8–10.8)
WBC UA: ABNORMAL /HPF (ref 0–5)

## 2019-08-26 PROCEDURE — 2580000003 HC RX 258: Performed by: PERSONAL EMERGENCY RESPONSE ATTENDANT

## 2019-08-26 PROCEDURE — 84484 ASSAY OF TROPONIN QUANT: CPT

## 2019-08-26 PROCEDURE — 82550 ASSAY OF CK (CPK): CPT

## 2019-08-26 PROCEDURE — 85610 PROTHROMBIN TIME: CPT

## 2019-08-26 PROCEDURE — 83880 ASSAY OF NATRIURETIC PEPTIDE: CPT

## 2019-08-26 PROCEDURE — 99285 EMERGENCY DEPT VISIT HI MDM: CPT

## 2019-08-26 PROCEDURE — 36415 COLL VENOUS BLD VENIPUNCTURE: CPT

## 2019-08-26 PROCEDURE — G0480 DRUG TEST DEF 1-7 CLASSES: HCPCS

## 2019-08-26 PROCEDURE — 80053 COMPREHEN METABOLIC PANEL: CPT

## 2019-08-26 PROCEDURE — 84443 ASSAY THYROID STIM HORMONE: CPT

## 2019-08-26 PROCEDURE — 70450 CT HEAD/BRAIN W/O DYE: CPT

## 2019-08-26 PROCEDURE — 87086 URINE CULTURE/COLONY COUNT: CPT

## 2019-08-26 PROCEDURE — 85025 COMPLETE CBC W/AUTO DIFF WBC: CPT

## 2019-08-26 PROCEDURE — 82553 CREATINE MB FRACTION: CPT

## 2019-08-26 PROCEDURE — 81001 URINALYSIS AUTO W/SCOPE: CPT

## 2019-08-26 PROCEDURE — 71045 X-RAY EXAM CHEST 1 VIEW: CPT

## 2019-08-26 PROCEDURE — 80306 DRUG TEST PRSMV INSTRMNT: CPT

## 2019-08-26 PROCEDURE — 83605 ASSAY OF LACTIC ACID: CPT

## 2019-08-26 PROCEDURE — 93005 ELECTROCARDIOGRAM TRACING: CPT | Performed by: PERSONAL EMERGENCY RESPONSE ATTENDANT

## 2019-08-26 RX ORDER — 0.9 % SODIUM CHLORIDE 0.9 %
500 INTRAVENOUS SOLUTION INTRAVENOUS ONCE
Status: COMPLETED | OUTPATIENT
Start: 2019-08-26 | End: 2019-08-26

## 2019-08-26 RX ADMIN — SODIUM CHLORIDE 500 ML: 9 INJECTION, SOLUTION INTRAVENOUS at 20:45

## 2019-08-26 ASSESSMENT — ENCOUNTER SYMPTOMS
BLOOD IN STOOL: 0
SHORTNESS OF BREATH: 0
COLOR CHANGE: 0
ABDOMINAL PAIN: 0
DIARRHEA: 0
COUGH: 0
VOMITING: 0
SORE THROAT: 0
RHINORRHEA: 0
NAUSEA: 0

## 2019-08-26 NOTE — ED PROVIDER NOTES
None   Social Needs    Financial resource strain: None    Food insecurity:     Worry: None     Inability: None    Transportation needs:     Medical: None     Non-medical: None   Tobacco Use    Smoking status: Never Smoker    Smokeless tobacco: Never Used   Substance and Sexual Activity    Alcohol use: No    Drug use: No    Sexual activity: Not Currently   Lifestyle    Physical activity:     Days per week: None     Minutes per session: None    Stress: None   Relationships    Social connections:     Talks on phone: None     Gets together: None     Attends Restorationist service: None     Active member of club or organization: None     Attends meetings of clubs or organizations: None     Relationship status: None    Intimate partner violence:     Fear of current or ex partner: None     Emotionally abused: None     Physically abused: None     Forced sexual activity: None   Other Topics Concern    None   Social History Narrative    None         PHYSICAL EXAM         ED Triage Vitals [08/26/19 1914]   BP Temp Temp Source Pulse Resp SpO2 Height Weight   101/77 97.7 °F (36.5 °C) Oral 82 18 95 % 5' 5\" (1.651 m) 140 lb (63.5 kg)       Physical Exam   Constitutional: She appears well-developed and well-nourished. HENT:   Head: Normocephalic and atraumatic. Mouth/Throat: Oropharynx is clear and moist.   Eyes: Pupils are equal, round, and reactive to light. Conjunctivae and EOM are normal.   Neck: Normal range of motion. Neck supple. No tracheal deviation present. Cardiovascular: Normal heart sounds and intact distal pulses. Pulmonary/Chest: Effort normal and breath sounds normal. No stridor. No respiratory distress. Abdominal: Soft. Bowel sounds are normal. She exhibits no distension and no mass. There is no tenderness. There is no rebound and no guarding. Musculoskeletal: Normal range of motion. Neurological: She is alert. She has normal reflexes. Alert to self   Skin: Skin is warm and dry.  Capillary refill takes less than 2 seconds. No rash noted. Psychiatric: She has a normal mood and affect. Her behavior is normal. Judgment and thought content normal.       DIAGNOSTIC RESULTS     EKG:All EKG's are interpreted by the Emergency Department Physician who either signs or Co-signs this chart in the absence of a cardiologist.    EKG shows A. fib, heart rate 79, heart rate irregularly irregular, no ST segment changes    RADIOLOGY:   Non-plain film images such as CT, Ultrasound and MRI are read by theradiologist. Plain radiographic images are visualized and preliminarily interpreted by the emergency physician with the below findings:    Interpretation per theRadiologist below, if available at the time of this note:    CT Head WO Contrast   Final Result      NO ACUTE INTRA-AXIAL OR EXTRA-AXIAL FINDINGS. IF SIGNS OR SYMPTOMS PERSIST THEN CONSIDER REPEAT CT SCAN IN 12 TO 24 HOURS OR MRI TO FURTHER EVALUATE IF THERE ARE NO CONTRAINDICATIONS      All CT scans at this facility use dose modulation, iterative reconstruction, and/or weight based dosing when appropriate to reduce radiation dose to as low as reasonably achievable.             XR CHEST PORTABLE    (Results Pending)           LABS:  Labs Reviewed   LACTATE, SEPSIS - Abnormal; Notable for the following components:       Result Value    Lactic Acid, Sepsis 3.4 (*)     All other components within normal limits    Narrative:     Eliudcheryl Dominguez  [facility] tel. [phone],  Lactic Acid results called to and read back by Jolynn Mendoza, 08/26/2019  21:32, by Hermann Munoz   URINE RT REFLEX TO CULTURE - Abnormal; Notable for the following components:    Leukocyte Esterase, Urine SMALL (*)     All other components within normal limits   ACETAMINOPHEN LEVEL - Abnormal; Notable for the following components:    Acetaminophen Level <5 (*)     All other components within normal limits   SALICYLATE LEVEL - Abnormal; Notable for the following components:    Salicylate, Serum <6.5 (*)

## 2019-08-27 VITALS
DIASTOLIC BLOOD PRESSURE: 99 MMHG | HEART RATE: 83 BPM | SYSTOLIC BLOOD PRESSURE: 145 MMHG | BODY MASS INDEX: 23.32 KG/M2 | TEMPERATURE: 97.7 F | HEIGHT: 65 IN | RESPIRATION RATE: 18 BRPM | WEIGHT: 140 LBS | OXYGEN SATURATION: 97 %

## 2019-08-27 LAB
AMPHETAMINE SCREEN, URINE: NORMAL
BARBITURATE SCREEN URINE: NORMAL
BENZODIAZEPINE SCREEN, URINE: NORMAL
CANNABINOID SCREEN URINE: NORMAL
COCAINE METABOLITE SCREEN URINE: NORMAL
Lab: NORMAL
OPIATE SCREEN URINE: NORMAL
PHENCYCLIDINE SCREEN URINE: NORMAL
TRICYCLIC, URINE: NORMAL

## 2019-08-27 PROCEDURE — 6370000000 HC RX 637 (ALT 250 FOR IP): Performed by: PHYSICIAN ASSISTANT

## 2019-08-27 PROCEDURE — 6370000000 HC RX 637 (ALT 250 FOR IP): Performed by: PERSONAL EMERGENCY RESPONSE ATTENDANT

## 2019-08-27 PROCEDURE — 93010 ELECTROCARDIOGRAM REPORT: CPT | Performed by: INTERNAL MEDICINE

## 2019-08-27 RX ORDER — METOPROLOL SUCCINATE 50 MG/1
100 TABLET, EXTENDED RELEASE ORAL DAILY
Status: DISCONTINUED | OUTPATIENT
Start: 2019-08-27 | End: 2019-08-27 | Stop reason: HOSPADM

## 2019-08-27 RX ORDER — DIGOXIN 0.05 MG/ML
250 SOLUTION ORAL DAILY
Status: DISCONTINUED | OUTPATIENT
Start: 2019-08-27 | End: 2019-08-27 | Stop reason: RX

## 2019-08-27 RX ORDER — FUROSEMIDE 20 MG/1
20 TABLET ORAL DAILY
Status: DISCONTINUED | OUTPATIENT
Start: 2019-08-27 | End: 2019-08-27 | Stop reason: HOSPADM

## 2019-08-27 RX ORDER — LORAZEPAM 1 MG/1
1 TABLET ORAL ONCE
Status: COMPLETED | OUTPATIENT
Start: 2019-08-27 | End: 2019-08-27

## 2019-08-27 RX ORDER — DIGOXIN 250 MCG
250 TABLET ORAL ONCE
Status: COMPLETED | OUTPATIENT
Start: 2019-08-27 | End: 2019-08-27

## 2019-08-27 RX ORDER — DILTIAZEM HYDROCHLORIDE 120 MG/1
120 CAPSULE, COATED, EXTENDED RELEASE ORAL DAILY
Status: DISCONTINUED | OUTPATIENT
Start: 2019-08-27 | End: 2019-08-27 | Stop reason: HOSPADM

## 2019-08-27 RX ADMIN — DIGOXIN 250 MCG: 250 TABLET ORAL at 08:23

## 2019-08-27 RX ADMIN — FUROSEMIDE 20 MG: 20 TABLET ORAL at 07:47

## 2019-08-27 RX ADMIN — LORAZEPAM 1 MG: 1 TABLET ORAL at 00:35

## 2019-08-27 RX ADMIN — METOPROLOL SUCCINATE 100 MG: 50 TABLET, EXTENDED RELEASE ORAL at 07:46

## 2019-08-27 RX ADMIN — DILTIAZEM HYDROCHLORIDE 120 MG: 120 CAPSULE, COATED, EXTENDED RELEASE ORAL at 08:23

## 2019-08-27 NOTE — ED NOTES
Pink slip faxed to Deanna at [facility] at [address] [facility].  Rei Denney RN  08/27/19 3464

## 2019-08-27 NOTE — ED NOTES
Verification from Deanna at [facility], pink slip received. She will call back regarding bed assignment and transport. Flora Dao RN  08/27/19 3591

## 2019-08-27 NOTE — ED NOTES
Updated [facility] with nurse to nurse report number and patient acceptance status. They requested for transport after 7am. Nurse report [phone]. Will get bed assignment when report is given. Negar Denney RN  08/27/19 3878

## 2019-08-27 NOTE — ED NOTES
Call to lab for urine drug screen results- will be resulting in approx 15 minutes      Stephani Montalvo RN  08/27/19 0533

## 2019-08-27 NOTE — ED NOTES
Stephani SALAS [facility] AN AFFILIATE OF [facility] nurse at bedside. Assessment completed.       Johana Mai RN  08/26/19 9944

## 2019-08-28 LAB — URINE CULTURE, ROUTINE: NORMAL

## 2019-10-29 RX ORDER — RISPERIDONE 1 MG/1
TABLET, FILM COATED ORAL
Qty: 90 TABLET | Refills: 0 | Status: SHIPPED | OUTPATIENT
Start: 2019-10-29

## 2019-12-10 ENCOUNTER — TELEPHONE (OUTPATIENT)
Dept: NEUROLOGY | Age: 84
End: 2019-12-10

## 2019-12-10 ENCOUNTER — OFFICE VISIT (OUTPATIENT)
Dept: NEUROLOGY | Age: 84
End: 2019-12-10
Payer: MEDICARE

## 2019-12-10 VITALS
WEIGHT: 148 LBS | HEIGHT: 65 IN | SYSTOLIC BLOOD PRESSURE: 105 MMHG | DIASTOLIC BLOOD PRESSURE: 72 MMHG | BODY MASS INDEX: 24.66 KG/M2

## 2019-12-10 DIAGNOSIS — G93.40 ENCEPHALOPATHY: ICD-10-CM

## 2019-12-10 DIAGNOSIS — G25.0 ESSENTIAL TREMOR: ICD-10-CM

## 2019-12-10 DIAGNOSIS — G50.0 TRIGEMINAL NEURALGIA: ICD-10-CM

## 2019-12-10 DIAGNOSIS — F48.2 PSEUDOBULBAR AFFECT: ICD-10-CM

## 2019-12-10 DIAGNOSIS — G20 PARKINSONS DISEASE (HCC): ICD-10-CM

## 2019-12-10 PROBLEM — F33.0 DEPRESSION, MAJOR, RECURRENT, MILD (HCC): Status: ACTIVE | Noted: 2019-02-05

## 2019-12-10 PROBLEM — M54.50 CHRONIC BILATERAL LOW BACK PAIN WITHOUT SCIATICA: Status: ACTIVE | Noted: 2017-08-14

## 2019-12-10 PROBLEM — I48.20 ATRIAL FIBRILLATION, CHRONIC (HCC): Status: ACTIVE | Noted: 2018-07-27

## 2019-12-10 PROBLEM — G89.29 CHRONIC BILATERAL LOW BACK PAIN WITHOUT SCIATICA: Status: ACTIVE | Noted: 2017-08-14

## 2019-12-10 PROCEDURE — 99214 OFFICE O/P EST MOD 30 MIN: CPT | Performed by: PSYCHIATRY & NEUROLOGY

## 2019-12-10 RX ORDER — ACETAMINOPHEN 500 MG
650 TABLET ORAL
COMMUNITY

## 2019-12-10 RX ORDER — DIVALPROEX SODIUM 125 MG/1
250 CAPSULE, COATED PELLETS ORAL 3 TIMES DAILY
COMMUNITY
Start: 2019-10-28

## 2019-12-10 ASSESSMENT — ENCOUNTER SYMPTOMS
PHOTOPHOBIA: 0
NAUSEA: 0
CHOKING: 0
SHORTNESS OF BREATH: 0
TROUBLE SWALLOWING: 0
VOMITING: 0
BACK PAIN: 0

## 2019-12-16 ENCOUNTER — TELEPHONE (OUTPATIENT)
Dept: NEUROLOGY | Age: 84
End: 2019-12-16

## 2020-01-01 ENCOUNTER — HOSPITAL ENCOUNTER (EMERGENCY)
Age: 85
Discharge: HOME | End: 2020-01-01
Payer: MEDICARE

## 2020-01-01 VITALS
OXYGEN SATURATION: 98 % | RESPIRATION RATE: 16 BRPM | WEIGHT: 150 LBS | BODY MASS INDEX: 24.99 KG/M2 | SYSTOLIC BLOOD PRESSURE: 147 MMHG | DIASTOLIC BLOOD PRESSURE: 103 MMHG | HEART RATE: 107 BPM | TEMPERATURE: 98 F | HEIGHT: 65 IN

## 2020-01-01 LAB
ALBUMIN SERPL-MCNC: 4.5 G/DL (ref 3.5–4.6)
ALP BLD-CCNC: 67 U/L (ref 40–130)
ALT SERPL-CCNC: 20 U/L (ref 0–33)
ANION GAP SERPL CALCULATED.3IONS-SCNC: 14 MEQ/L (ref 9–15)
APTT: 44 SEC (ref 24.4–36.8)
AST SERPL-CCNC: 29 U/L (ref 0–35)
BASOPHILS ABSOLUTE: 0 K/UL (ref 0–0.2)
BASOPHILS RELATIVE PERCENT: 0.6 %
BILIRUB SERPL-MCNC: 0.7 MG/DL (ref 0.2–0.7)
BILIRUBIN URINE: NEGATIVE
BLOOD, URINE: NEGATIVE
BUN BLDV-MCNC: 17 MG/DL (ref 8–23)
CALCIUM SERPL-MCNC: 9.7 MG/DL (ref 8.5–9.9)
CHLORIDE BLD-SCNC: 96 MEQ/L (ref 95–107)
CLARITY: CLEAR
CO2: 28 MEQ/L (ref 20–31)
COLOR: YELLOW
CREAT SERPL-MCNC: 0.83 MG/DL (ref 0.5–0.9)
EOSINOPHILS ABSOLUTE: 0 K/UL (ref 0–0.7)
EOSINOPHILS RELATIVE PERCENT: 0.7 %
GFR AFRICAN AMERICAN: >60
GFR NON-AFRICAN AMERICAN: >60
GLOBULIN: 3.3 G/DL (ref 2.3–3.5)
GLUCOSE BLD-MCNC: 148 MG/DL (ref 70–99)
GLUCOSE URINE: NEGATIVE MG/DL
HCT VFR BLD CALC: 46.7 % (ref 37–47)
HEMOGLOBIN: 15.3 G/DL (ref 12–16)
INR BLD: 3
KETONES, URINE: NEGATIVE MG/DL
LACTIC ACID: 2.6 MMOL/L (ref 0.5–2.2)
LEUKOCYTE ESTERASE, URINE: NEGATIVE
LYMPHOCYTES ABSOLUTE: 1.1 K/UL (ref 1–4.8)
LYMPHOCYTES RELATIVE PERCENT: 16.4 %
MAGNESIUM: 2.1 MG/DL (ref 1.7–2.4)
MCH RBC QN AUTO: 30.1 PG (ref 27–31.3)
MCHC RBC AUTO-ENTMCNC: 32.8 % (ref 33–37)
MCV RBC AUTO: 91.7 FL (ref 82–100)
MONOCYTES ABSOLUTE: 0.5 K/UL (ref 0.2–0.8)
MONOCYTES RELATIVE PERCENT: 7.4 %
NEUTROPHILS ABSOLUTE: 4.9 K/UL (ref 1.4–6.5)
NEUTROPHILS RELATIVE PERCENT: 74.9 %
NITRITE, URINE: NEGATIVE
PDW BLD-RTO: 13.4 % (ref 11.5–14.5)
PH UA: 5 (ref 5–9)
PLATELET # BLD: 196 K/UL (ref 130–400)
POTASSIUM SERPL-SCNC: 4.1 MEQ/L (ref 3.4–4.9)
PROTEIN UA: NEGATIVE MG/DL
PROTHROMBIN TIME: 32.3 SEC (ref 12.3–14.9)
RBC # BLD: 5.1 M/UL (ref 4.2–5.4)
SODIUM BLD-SCNC: 138 MEQ/L (ref 135–144)
SPECIFIC GRAVITY UA: 1.01 (ref 1–1.03)
TOTAL CK: 63 U/L (ref 0–170)
TOTAL PROTEIN: 7.8 G/DL (ref 6.3–8)
URINE REFLEX TO CULTURE: NORMAL
UROBILINOGEN, URINE: 0.2 E.U./DL
WBC # BLD: 6.6 K/UL (ref 4.8–10.8)

## 2020-01-01 PROCEDURE — 81003 URINALYSIS AUTO W/O SCOPE: CPT

## 2020-01-01 PROCEDURE — 85025 COMPLETE CBC W/AUTO DIFF WBC: CPT

## 2020-01-01 PROCEDURE — 80053 COMPREHEN METABOLIC PANEL: CPT

## 2020-01-01 PROCEDURE — 85730 THROMBOPLASTIN TIME PARTIAL: CPT

## 2020-01-01 PROCEDURE — 82550 ASSAY OF CK (CPK): CPT

## 2020-01-01 PROCEDURE — 85610 PROTHROMBIN TIME: CPT

## 2020-01-01 PROCEDURE — 83605 ASSAY OF LACTIC ACID: CPT

## 2020-01-01 PROCEDURE — 36415 COLL VENOUS BLD VENIPUNCTURE: CPT

## 2020-01-01 PROCEDURE — 99284 EMERGENCY DEPT VISIT MOD MDM: CPT

## 2020-01-01 PROCEDURE — 83735 ASSAY OF MAGNESIUM: CPT

## 2020-01-02 NOTE — ED NOTES
Pt's spouse Lelo Prakash at bedside. Pt states \"that's not my , that is a fake , my  is Karely Simms Yuly. \" Man at bedside showed this nurse his 's license and confirmed his name is Lelo Prakash.       Monet Blackburn RN  01/01/20 6804

## 2020-01-02 NOTE — ED NOTES
Bed: 18  Expected date: 1/1/20  Expected time: 6:55 PM  Means of arrival: [facility]  Comments:  85YF DEMENTIA PATIENT PICKED UP BY [facility]      Dany Grajeda RN  01/01/20 9067

## 2020-01-02 NOTE — ED PROVIDER NOTES
[address] [facility] ED  eMERGENCY dEPARTMENTeNCOUnter      Pt Name: Carlos Judge  MRN: [id number]  Armsericagfmaria antonia 1934  Date ofevaluation: 1/1/2020  Provider: Domitila Alvarez PA-C    CHIEF COMPLAINT       Chief Complaint   Patient presents with    Other         HISTORY OF PRESENT ILLNESS   (Location/Symptom, Timing/Onset,Context/Setting, Quality, Duration, Modifying Factors, Severity)  Note limiting factors. Carlos Judge is a 80 y.o. female who presents to the emergency department by EMS after the patient was taken to the police department by a good Hindu who found her wandering outside. Patient has a history of dementia and  accompanies her to the hospital stating that he had been watching TV and he had thought that the patient had gone to the bedroom and he states that he shortly later noticed that she was not in the home. He had called the police department and was advised that she was at the police department and was being taken to the emergency department.  states that she has a history of dementia and is at her baseline mentation currently.  has trying to get her placed at [address] [place] [address] but states that he has not been able to complete the paperwork yet. Patient is anxious and tearful but has no physical complaints other than stating \"I am very sick\". Patient also states that she is  to 2 men and the man that accompanies her to the hospital is not her . HPI    NursingNotes were reviewed. REVIEW OF SYSTEMS    (2-9 systems for level 4, 10 or more for level 5)     Review of Systems   Unable to perform ROS: Dementia       Except as noted above the remainder of the review of systems was reviewed and negative.        PAST MEDICAL HISTORY     Past Medical History:   Diagnosis Date    Anxiety     Atrial fibrillation ([address]     Atrial fibrillation (HCC)     Atrial fibrillation (HCC)     Chronic back pain     Dementia [facility]     Depression     Encephalopathy     Essential tremor     Hypertension     Parkinsons disease ([facility]     Pseudobulbar affect     Trigeminal neuralgia          SURGICALHISTORY       Past Surgical History:   Procedure Laterality Date    COLONOSCOPY  11/18/09    DR Mercie Dance    HYSTERECTOMY      SMALL INTESTINE SURGERY      PARTIAL    UPPER GASTROINTESTINAL ENDOSCOPY  5/20/09    DR DONALD         CURRENT MEDICATIONS       Previous Medications    ACETAMINOPHEN (TYLENOL) 500 MG TABLET    Take 500 mg by mouth    DIGOXIN (LANOXIN) 250 MCG TABLET    Take 250 mcg by mouth daily To = 0.25mg    DILTIAZEM (CARDIZEM CD) 120 MG EXTENDED RELEASE CAPSULE    Take 1 capsule by mouth daily    DIVALPROEX (DEPAKOTE SPRINKLE) 125 MG CAPSULE        DULOXETINE (CYMBALTA) 30 MG EXTENDED RELEASE CAPSULE    Take 30 mg by mouth nightly     FAMOTIDINE (PEPCID) 20 MG TABLET    TAKE 1 TABLET BY MOUTH TWICE DAILY    FLUTICASONE (FLONASE) 50 MCG/ACT NASAL SPRAY    2 sprays by Each Nare route 2 times daily as needed     FUROSEMIDE (LASIX) 20 MG TABLET    Take 20 mg by mouth See Admin Instructions 3 times per week Mon, Wed, Fri    MEMANTINE (NAMENDA) 5 MG TABLET    Take 1 tablet by mouth 2 times daily    METFORMIN (GLUCOPHAGE) 500 MG TABLET    Take 1,000 mg by mouth    METOPROLOL SUCCINATE (TOPROL XL) 100 MG EXTENDED RELEASE TABLET    Take 1 tablet by mouth 2 times daily    QUETIAPINE (SEROQUEL) 25 MG TABLET    Take 0.5 tablets by mouth 2 times daily    RISPERIDONE (RISPERDAL) 1 MG TABLET    TAKE 1 TABLET BY MOUTH AT BEDTIME    WARFARIN (COUMADIN) 6 MG TABLET    Take 1 tablet by mouth See Admin Instructions ( INR 3.1 on 12/11/18)  TAKE 6 MG Tues, Thurs, Sat and TAKE 3 MG Sun, Mon, Wed, Fri  NEXT INR BLOOD DRAW 12/26/18       ALLERGIES     Donepezil and Codeine    FAMILY HISTORY     History reviewed. No pertinent family history.        SOCIAL HISTORY       Social History     Socioeconomic History    Marital status:      Spouse name: None    Number of heart sounds. Pulmonary:      Effort: Pulmonary effort is normal. No respiratory distress. Breath sounds: Normal breath sounds. Abdominal:      General: Bowel sounds are normal. There is no distension. Palpations: Abdomen is soft. Musculoskeletal: Normal range of motion. Skin:     General: Skin is warm and dry. Findings: No erythema or rash. Neurological:      Mental Status: She is alert and oriented to person, place, and time. Cranial Nerves: No cranial nerve deficit. Motor: No abnormal muscle tone. Psychiatric:         Mood and Affect: Mood is anxious. Affect is tearful. Judgment: Judgment is inappropriate.          DIAGNOSTIC RESULTS     EKG: All EKG's are interpreted by the Emergency Department Physician who either signs or Co-signsthis chart in the absence of a cardiologist.      RADIOLOGY:   Excell Mulling such as CT, Ultrasound and MRI are read by the radiologist. Plain radiographic images are visualized and preliminarily interpreted by the emergency physician with the below findings:        Interpretation per the Radiologist below, if available at the time ofthis note:    No orders to display         ED BEDSIDE ULTRASOUND:   Performed by ED Physician - none    LABS:  Labs Reviewed   COMPREHENSIVE METABOLIC PANEL - Abnormal; Notable for the following components:       Result Value    Glucose 148 (*)     All other components within normal limits   CBC WITH AUTO DIFFERENTIAL - Abnormal; Notable for the following components:    MCHC 32.8 (*)     All other components within normal limits   LACTIC ACID, PLASMA - Abnormal; Notable for the following components:    Lactic Acid 2.6 (*)     All other components within normal limits   PROTIME-INR - Abnormal; Notable for the following components:    Protime 32.3 (*)     All other components within normal limits   APTT - Abnormal; Notable for the following components:    aPTT 44.0 (*)     All other components within normal limits   URINE RT REFLEX TO CULTURE   MAGNESIUM   CK       All other labs were within normal range or not returned as of this dictation. EMERGENCY DEPARTMENT COURSE and DIFFERENTIAL DIAGNOSIS/MDM:   Vitals:    Vitals:    01/01/20 1917 01/01/20 1918 01/01/20 1939 01/01/20 2019   BP:  137/80  (!) 147/103   Pulse: 104  92 92   Resp: 16 15 13 20   Temp:       TempSrc:       SpO2:  95% 98% 100%   Weight:       Height:               MDM      REASSESSMENT      Patient presented the emergency department after found wandering. Patient has normal laboratory testing and urinalysis and known history of dementia.  is attempting to place the patient in nursing home and will take the patient home and follow-up [facility] with [facility]    CONSULTS:  None    PROCEDURES:  Unless otherwise noted below, none     Procedures    FINAL IMPRESSION      1. Dementia with behavioral disturbance, unspecified dementia type [facility])          DISPOSITION/PLAN   DISPOSITION Decision To Discharge 01/01/2020 09:27:09 PM      PATIENT REFERREDTO:  Ricardo Alcaraz MD  [address] [facility] [address]  [phone]    In 1 day        DISCHARGEMEDICATIONS:  New Prescriptions    No medications on file     Controlled Substances Monitoring:     RX Monitoring 7/24/2015   Attestation The Prescription Monitoring Report for this patient was reviewed today. Periodic Controlled Substance Monitoring Possible medication side effects, risk of tolerance and/or dependence, and alternative treatments discussed; No signs of potential drug abuse or diversion identified.        (Please note that portions of this note were completed with a voice recognition program.  Efforts were made to edit the dictations but occasionally words are mis-transcribed.)    Suzan Goode PA-C (electronically signed)  Attending Emergency Physician          Suzan Goode PA-C  01/01/20 8555

## 2020-03-06 ENCOUNTER — APPOINTMENT (OUTPATIENT)
Dept: GENERAL RADIOLOGY | Age: 85
End: 2020-03-06
Payer: MEDICARE

## 2020-03-06 ENCOUNTER — APPOINTMENT (OUTPATIENT)
Dept: CT IMAGING | Age: 85
End: 2020-03-06
Payer: MEDICARE

## 2020-03-06 ENCOUNTER — HOSPITAL ENCOUNTER (EMERGENCY)
Age: 85
Discharge: HOME | End: 2020-03-06
Attending: EMERGENCY MEDICINE
Payer: MEDICARE

## 2020-03-06 VITALS
WEIGHT: 150 LBS | BODY MASS INDEX: 25.61 KG/M2 | TEMPERATURE: 98.1 F | DIASTOLIC BLOOD PRESSURE: 98 MMHG | HEART RATE: 108 BPM | RESPIRATION RATE: 19 BRPM | HEIGHT: 64 IN | SYSTOLIC BLOOD PRESSURE: 134 MMHG | OXYGEN SATURATION: 96 %

## 2020-03-06 LAB
ALBUMIN SERPL-MCNC: 4.5 G/DL (ref 3.5–4.6)
ALP BLD-CCNC: 67 U/L (ref 40–130)
ALT SERPL-CCNC: 22 U/L (ref 0–33)
AMPHETAMINE SCREEN, URINE: NORMAL
ANION GAP SERPL CALCULATED.3IONS-SCNC: 16 MEQ/L (ref 9–15)
AST SERPL-CCNC: 41 U/L (ref 0–35)
BARBITURATE SCREEN URINE: NORMAL
BASOPHILS ABSOLUTE: 0.1 K/UL (ref 0–0.2)
BASOPHILS RELATIVE PERCENT: 1.4 %
BENZODIAZEPINE SCREEN, URINE: NORMAL
BILIRUB SERPL-MCNC: 0.5 MG/DL (ref 0.2–0.7)
BILIRUBIN URINE: NEGATIVE
BLOOD, URINE: NEGATIVE
BUN BLDV-MCNC: 11 MG/DL (ref 8–23)
CALCIUM SERPL-MCNC: 9.8 MG/DL (ref 8.5–9.9)
CANNABINOID SCREEN URINE: NORMAL
CHLORIDE BLD-SCNC: 95 MEQ/L (ref 95–107)
CLARITY: CLEAR
CO2: 26 MEQ/L (ref 20–31)
COCAINE METABOLITE SCREEN URINE: NORMAL
COLOR: YELLOW
CREAT SERPL-MCNC: 0.75 MG/DL (ref 0.5–0.9)
EKG ATRIAL RATE: 78 BPM
EKG Q-T INTERVAL: 316 MS
EKG QRS DURATION: 84 MS
EKG QTC CALCULATION (BAZETT): 405 MS
EKG R AXIS: 12 DEGREES
EKG T AXIS: 87 DEGREES
EKG VENTRICULAR RATE: 99 BPM
EOSINOPHILS ABSOLUTE: 0.1 K/UL (ref 0–0.7)
EOSINOPHILS RELATIVE PERCENT: 0.8 %
ETHANOL PERCENT: NORMAL G/DL
ETHANOL: <10 MG/DL (ref 0–0.08)
GFR AFRICAN AMERICAN: >60
GFR NON-AFRICAN AMERICAN: >60
GLOBULIN: 3.5 G/DL (ref 2.3–3.5)
GLUCOSE BLD-MCNC: 222 MG/DL (ref 70–99)
GLUCOSE URINE: 100 MG/DL
HCT VFR BLD CALC: 47.6 % (ref 37–47)
HEMOGLOBIN: 15.6 G/DL (ref 12–16)
KETONES, URINE: NEGATIVE MG/DL
LEUKOCYTE ESTERASE, URINE: NEGATIVE
LYMPHOCYTES ABSOLUTE: 1.5 K/UL (ref 1–4.8)
LYMPHOCYTES RELATIVE PERCENT: 20.8 %
Lab: NORMAL
MAGNESIUM: 1.9 MG/DL (ref 1.7–2.4)
MCH RBC QN AUTO: 29.8 PG (ref 27–31.3)
MCHC RBC AUTO-ENTMCNC: 32.8 % (ref 33–37)
MCV RBC AUTO: 90.8 FL (ref 82–100)
METHADONE SCREEN, URINE: NORMAL
MONOCYTES ABSOLUTE: 0.5 K/UL (ref 0.2–0.8)
MONOCYTES RELATIVE PERCENT: 6.8 %
NEUTROPHILS ABSOLUTE: 5 K/UL (ref 1.4–6.5)
NEUTROPHILS RELATIVE PERCENT: 70.2 %
NITRITE, URINE: NEGATIVE
OPIATE SCREEN URINE: NORMAL
OXYCODONE URINE: NORMAL
PDW BLD-RTO: 13.6 % (ref 11.5–14.5)
PH UA: 5 (ref 5–9)
PHENCYCLIDINE SCREEN URINE: NORMAL
PLATELET # BLD: 170 K/UL (ref 130–400)
POTASSIUM SERPL-SCNC: 4.2 MEQ/L (ref 3.4–4.9)
PROPOXYPHENE SCREEN: NORMAL
PROTEIN UA: NEGATIVE MG/DL
RBC # BLD: 5.25 M/UL (ref 4.2–5.4)
REASON FOR REJECTION: NORMAL
REJECTED TEST: NORMAL
SODIUM BLD-SCNC: 137 MEQ/L (ref 135–144)
SPECIFIC GRAVITY UA: 1.01 (ref 1–1.03)
TOTAL PROTEIN: 8 G/DL (ref 6.3–8)
TROPONIN: <0.01 NG/ML (ref 0–0.01)
TSH SERPL DL<=0.05 MIU/L-ACNC: 0.89 UIU/ML (ref 0.44–3.86)
UROBILINOGEN, URINE: 0.2 E.U./DL
WBC # BLD: 7.1 K/UL (ref 4.8–10.8)

## 2020-03-06 PROCEDURE — 83735 ASSAY OF MAGNESIUM: CPT

## 2020-03-06 PROCEDURE — 80053 COMPREHEN METABOLIC PANEL: CPT

## 2020-03-06 PROCEDURE — 71046 X-RAY EXAM CHEST 2 VIEWS: CPT

## 2020-03-06 PROCEDURE — G0480 DRUG TEST DEF 1-7 CLASSES: HCPCS

## 2020-03-06 PROCEDURE — 36415 COLL VENOUS BLD VENIPUNCTURE: CPT

## 2020-03-06 PROCEDURE — 2580000003 HC RX 258: Performed by: EMERGENCY MEDICINE

## 2020-03-06 PROCEDURE — 84484 ASSAY OF TROPONIN QUANT: CPT

## 2020-03-06 PROCEDURE — 81003 URINALYSIS AUTO W/O SCOPE: CPT

## 2020-03-06 PROCEDURE — 85025 COMPLETE CBC W/AUTO DIFF WBC: CPT

## 2020-03-06 PROCEDURE — 80307 DRUG TEST PRSMV CHEM ANLYZR: CPT

## 2020-03-06 PROCEDURE — 84443 ASSAY THYROID STIM HORMONE: CPT

## 2020-03-06 PROCEDURE — 93010 ELECTROCARDIOGRAM REPORT: CPT | Performed by: INTERNAL MEDICINE

## 2020-03-06 PROCEDURE — 70450 CT HEAD/BRAIN W/O DYE: CPT

## 2020-03-06 PROCEDURE — 93005 ELECTROCARDIOGRAM TRACING: CPT | Performed by: EMERGENCY MEDICINE

## 2020-03-06 PROCEDURE — 99285 EMERGENCY DEPT VISIT HI MDM: CPT

## 2020-03-06 RX ORDER — 0.9 % SODIUM CHLORIDE 0.9 %
1000 INTRAVENOUS SOLUTION INTRAVENOUS ONCE
Status: COMPLETED | OUTPATIENT
Start: 2020-03-06 | End: 2020-03-06

## 2020-03-06 RX ORDER — CLONAZEPAM 0.5 MG/1
0.5 TABLET ORAL 2 TIMES DAILY PRN
COMMUNITY

## 2020-03-06 RX ADMIN — SODIUM CHLORIDE 1000 ML: 9 INJECTION, SOLUTION INTRAVENOUS at 13:10

## 2020-03-06 ASSESSMENT — ENCOUNTER SYMPTOMS
NAUSEA: 0
COUGH: 0
SHORTNESS OF BREATH: 0
BACK PAIN: 0
SORE THROAT: 0
VOMITING: 0
ABDOMINAL PAIN: 0
DIARRHEA: 0
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
## 2020-03-06 NOTE — CARE COORDINATION
Spoke with Mr. Anna King regarding options; he reports he had been in contact with [facility] [employer] [facility] and completed paperwork to place pt in nursing home back in December although the paperwork is now  and he needs to reinstate it. Is aware of what he needs to do. Reports that he does not really want to put her in a nursing home. He expressed that he wants her admitted to therapy for 30 days. Taught that admission to hospital is inappropriate at this time because she does not meet criteria. He does not want pt placed in [address] [place] [address] because she had been admitted there and \"she was like a zombie\", had also been admitted to [facility] in [place] but he could not drive that far to see her and would not want her there. Also reports she was admitted to [facility] in [facility] for nearly 2 weeks in the past. Offered to contact nursing facility of his choice for respite care, declined because he cannot pay out of pocket. Offered information regarding nonskilled home assistance, declined because he cannot pay out of pocket. Mr. Anna King reports that often pt becomes calm and cooperative after episodes like today. Would rather take her home at this time. Taught him process of obtaining guardianship, taught to enlist help from Dr. Merlynn Aase to start the process. Offered him Care [employer]. Update to Dr. Checo Kim.

## 2020-03-06 NOTE — ED PROVIDER NOTES
[address] [facility] ED  eMERGENCYdEPARTMENT eNCOUnter      Pt Name: Chapito Duran  MRN: [id number]  [place] 1934  Date of evaluation: 3/6/2020  Gaby Troy MD    CHIEF COMPLAINT           HPI  Chapito Duran is a 80 y.o. female per chart review has a h/o afib, low back pain, depression/anxiety, Parkinson's disease, delusions presents to the ED with AMS. Pt was found wandering the streets. Pt notes she was running away from her . Pt denies depression, SI/HI, AVH. Pt denies fever, n/v, cp, sob, dysuria, diarrhea . ROS  Review of Systems   Constitutional: Negative for activity change, chills and fever. HENT: Negative for ear pain and sore throat. Eyes: Negative for visual disturbance. Respiratory: Negative for cough and shortness of breath. Cardiovascular: Negative for chest pain, palpitations and leg swelling. Gastrointestinal: Negative for abdominal pain, diarrhea, nausea and vomiting. Genitourinary: Negative for dysuria. Musculoskeletal: Negative for back pain. Skin: Negative for rash. Neurological: Negative for dizziness and weakness. Psychiatric/Behavioral: Positive for behavioral problems. Except as noted above the remainder of the review of systems was reviewed and negative.        PAST MEDICAL HISTORY     Past Medical History:   Diagnosis Date    Anxiety     Atrial fibrillation ([address]     Atrial fibrillation (HCC)     Atrial fibrillation (HCC)     Chronic back pain     Dementia (HCC)     Depression     Encephalopathy     Essential tremor     Hypertension     Parkinsons disease ([facility]     Pseudobulbar affect     Trigeminal neuralgia          SURGICAL HISTORY       Past Surgical History:   Procedure Laterality Date    COLONOSCOPY  11/18/09    DR Hannah Whiteside    HYSTERECTOMY      SMALL INTESTINE SURGERY      PARTIAL    UPPER GASTROINTESTINAL ENDOSCOPY  5/20/09    DR DONALD         CURRENTMEDICATIONS       Previous Medications ACETAMINOPHEN (TYLENOL) 500 MG TABLET    Take 500 mg by mouth    CLONAZEPAM (KLONOPIN) 0.5 MG TABLET    Take 0.5 mg by mouth 2 times daily as needed. DIGOXIN (LANOXIN) 250 MCG TABLET    Take 250 mcg by mouth daily To = 0.25mg    DILTIAZEM (CARDIZEM CD) 120 MG EXTENDED RELEASE CAPSULE    Take 1 capsule by mouth daily    DIVALPROEX (DEPAKOTE SPRINKLE) 125 MG CAPSULE        DULOXETINE (CYMBALTA) 30 MG EXTENDED RELEASE CAPSULE    Take 30 mg by mouth nightly     FAMOTIDINE (PEPCID) 20 MG TABLET    TAKE 1 TABLET BY MOUTH TWICE DAILY    FLUTICASONE (FLONASE) 50 MCG/ACT NASAL SPRAY    2 sprays by Each Nare route 2 times daily as needed     FUROSEMIDE (LASIX) 20 MG TABLET    Take 20 mg by mouth See Admin Instructions 3 times per week Mon, Wed, Fri    MEMANTINE (NAMENDA) 5 MG TABLET    Take 1 tablet by mouth 2 times daily    METFORMIN (GLUCOPHAGE) 500 MG TABLET    Take 1,000 mg by mouth    METOPROLOL SUCCINATE (TOPROL XL) 100 MG EXTENDED RELEASE TABLET    Take 1 tablet by mouth 2 times daily    QUETIAPINE (SEROQUEL) 25 MG TABLET    Take 0.5 tablets by mouth 2 times daily    RISPERIDONE (RISPERDAL) 1 MG TABLET    TAKE 1 TABLET BY MOUTH AT BEDTIME    WARFARIN (COUMADIN) 6 MG TABLET    Take 1 tablet by mouth See Admin Instructions ( INR 3.1 on 12/11/18)  TAKE 6 MG Tues, Thurs, Sat and TAKE 3 MG Sun, Mon, Wed, Fri  NEXT INR BLOOD DRAW 12/26/18       ALLERGIES     Donepezil and Codeine    FAMILY HISTORY     History reviewed. No pertinent family history.        SOCIAL HISTORY       Social History     Socioeconomic History    Marital status:      Spouse name: None    Number of children: None    Years of education: None    Highest education level: None   Occupational History    None   Social Needs    Financial resource strain: None    Food insecurity:     Worry: None     Inability: None    Transportation needs:     Medical: None     Non-medical: None   Tobacco Use    Smoking status: Never Smoker    Smokeless

## 2020-03-06 NOTE — ED NOTES
This RN at bedside to reassess pt. Pt resting quietly in ED cot w/no s/s of distress noted. Resp even and unlabored. Pt calm and cooperative.       Karthik Parra RN  03/06/20 2900

## 2021-01-01 ENCOUNTER — APPOINTMENT (OUTPATIENT)
Dept: GENERAL RADIOLOGY | Age: 86
End: 2021-01-01
Payer: COMMERCIAL

## 2021-01-01 ENCOUNTER — APPOINTMENT (OUTPATIENT)
Dept: CT IMAGING | Age: 86
End: 2021-01-01
Payer: COMMERCIAL

## 2021-01-01 ENCOUNTER — HOSPITAL ENCOUNTER (EMERGENCY)
Age: 86
Discharge: HOME | End: 2021-02-14
Payer: COMMERCIAL

## 2021-01-01 ENCOUNTER — OFFICE VISIT (OUTPATIENT)
Dept: ORTHOPEDIC SURGERY | Age: 86
End: 2021-01-01
Payer: COMMERCIAL

## 2021-01-01 VITALS
TEMPERATURE: 96.9 F | HEART RATE: 65 BPM | WEIGHT: 120 LBS | HEIGHT: 63 IN | OXYGEN SATURATION: 96 % | BODY MASS INDEX: 21.26 KG/M2

## 2021-01-01 VITALS
BODY MASS INDEX: 23.92 KG/M2 | DIASTOLIC BLOOD PRESSURE: 79 MMHG | OXYGEN SATURATION: 98 % | RESPIRATION RATE: 19 BRPM | HEART RATE: 72 BPM | HEIGHT: 63 IN | TEMPERATURE: 98.5 F | WEIGHT: 135 LBS | SYSTOLIC BLOOD PRESSURE: 126 MMHG
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
## 2021-01-01 DIAGNOSIS — M25.50 ARTHRALGIA, UNSPECIFIED JOINT: ICD-10-CM

## 2021-01-01 DIAGNOSIS — S70.00XA CONTUSION OF HIP REGION: Primary | ICD-10-CM

## 2021-01-01 LAB
ABO/RH: NORMAL
ALBUMIN SERPL-MCNC: 4 G/DL (ref 3.5–4.6)
ALP BLD-CCNC: 65 U/L (ref 40–130)
ALT SERPL-CCNC: 19 U/L (ref 0–33)
ANION GAP SERPL CALCULATED.3IONS-SCNC: 9 MEQ/L (ref 9–15)
ANTIBODY SCREEN: NORMAL
AST SERPL-CCNC: 32 U/L (ref 0–35)
BACTERIA: NEGATIVE /HPF
BASOPHILS ABSOLUTE: 0 K/UL (ref 0–0.2)
BASOPHILS RELATIVE PERCENT: 0.5 %
BILIRUB SERPL-MCNC: 0.4 MG/DL (ref 0.2–0.7)
BILIRUBIN URINE: NEGATIVE
BLOOD, URINE: NEGATIVE
BUN BLDV-MCNC: 32 MG/DL (ref 8–23)
CALCIUM SERPL-MCNC: 9.4 MG/DL (ref 8.5–9.9)
CHLORIDE BLD-SCNC: 102 MEQ/L (ref 95–107)
CLARITY: CLEAR
CO2: 31 MEQ/L (ref 20–31)
COLOR: YELLOW
CREAT SERPL-MCNC: 0.78 MG/DL (ref 0.5–0.9)
EKG ATRIAL RATE: 76 BPM
EKG Q-T INTERVAL: 336 MS
EKG QRS DURATION: 64 MS
EKG QTC CALCULATION (BAZETT): 378 MS
EKG R AXIS: 34 DEGREES
EKG T AXIS: 20 DEGREES
EKG VENTRICULAR RATE: 76 BPM
EOSINOPHILS ABSOLUTE: 0 K/UL (ref 0–0.7)
EOSINOPHILS RELATIVE PERCENT: 0.6 %
EPITHELIAL CELLS, UA: ABNORMAL /HPF (ref 0–5)
GFR AFRICAN AMERICAN: >60
GFR NON-AFRICAN AMERICAN: >60
GLOBULIN: 2.9 G/DL (ref 2.3–3.5)
GLUCOSE BLD-MCNC: 126 MG/DL (ref 70–99)
GLUCOSE URINE: NEGATIVE MG/DL
HCT VFR BLD CALC: 41.4 % (ref 37–47)
HEMOGLOBIN: 13.5 G/DL (ref 12–16)
HYALINE CASTS: ABNORMAL /HPF (ref 0–5)
INR BLD: 2.6
KETONES, URINE: NEGATIVE MG/DL
LEUKOCYTE ESTERASE, URINE: ABNORMAL
LYMPHOCYTES ABSOLUTE: 1.2 K/UL (ref 1–4.8)
LYMPHOCYTES RELATIVE PERCENT: 18.9 %
MCH RBC QN AUTO: 31 PG (ref 27–31.3)
MCHC RBC AUTO-ENTMCNC: 32.7 % (ref 33–37)
MCV RBC AUTO: 94.9 FL (ref 82–100)
MONOCYTES ABSOLUTE: 0.7 K/UL (ref 0.2–0.8)
MONOCYTES RELATIVE PERCENT: 11.4 %
NEUTROPHILS ABSOLUTE: 4.5 K/UL (ref 1.4–6.5)
NEUTROPHILS RELATIVE PERCENT: 68.6 %
NITRITE, URINE: NEGATIVE
ORGANISM: ABNORMAL
PDW BLD-RTO: 14.4 % (ref 11.5–14.5)
PH UA: 6.5 (ref 5–9)
PLATELET # BLD: 107 K/UL (ref 130–400)
POTASSIUM SERPL-SCNC: 3.9 MEQ/L (ref 3.4–4.9)
PROTEIN UA: NEGATIVE MG/DL
PROTHROMBIN TIME: 27.6 SEC (ref 12.3–14.9)
RBC # BLD: 4.37 M/UL (ref 4.2–5.4)
RBC UA: ABNORMAL /HPF (ref 0–5)
SODIUM BLD-SCNC: 142 MEQ/L (ref 135–144)
SPECIFIC GRAVITY UA: 1.02 (ref 1–1.03)
TOTAL PROTEIN: 6.9 G/DL (ref 6.3–8)
TROPONIN: <0.01 NG/ML (ref 0–0.01)
URINE CULTURE, ROUTINE: ABNORMAL
URINE REFLEX TO CULTURE: YES
UROBILINOGEN, URINE: 0.2 E.U./DL
WBC # BLD: 6.5 K/UL (ref 4.8–10.8)
WBC UA: ABNORMAL /HPF (ref 0–5)

## 2021-01-01 PROCEDURE — 99285 EMERGENCY DEPT VISIT HI MDM: CPT

## 2021-01-01 PROCEDURE — 1036F TOBACCO NON-USER: CPT | Performed by: ORTHOPAEDIC SURGERY

## 2021-01-01 PROCEDURE — 87186 SC STD MICRODIL/AGAR DIL: CPT

## 2021-01-01 PROCEDURE — 73030 X-RAY EXAM OF SHOULDER: CPT

## 2021-01-01 PROCEDURE — 93010 ELECTROCARDIOGRAM REPORT: CPT | Performed by: INTERNAL MEDICINE

## 2021-01-01 PROCEDURE — 1090F PRES/ABSN URINE INCON ASSESS: CPT | Performed by: ORTHOPAEDIC SURGERY

## 2021-01-01 PROCEDURE — 72125 CT NECK SPINE W/O DYE: CPT

## 2021-01-01 PROCEDURE — 71045 X-RAY EXAM CHEST 1 VIEW: CPT

## 2021-01-01 PROCEDURE — 81001 URINALYSIS AUTO W/SCOPE: CPT

## 2021-01-01 PROCEDURE — 85610 PROTHROMBIN TIME: CPT

## 2021-01-01 PROCEDURE — 6370000000 HC RX 637 (ALT 250 FOR IP): Performed by: PHYSICIAN ASSISTANT

## 2021-01-01 PROCEDURE — G8428 CUR MEDS NOT DOCUMENT: HCPCS | Performed by: ORTHOPAEDIC SURGERY

## 2021-01-01 PROCEDURE — 6830039000 HC L3 TRAUMA ALERT

## 2021-01-01 PROCEDURE — 4040F PNEUMOC VAC/ADMIN/RCVD: CPT | Performed by: ORTHOPAEDIC SURGERY

## 2021-01-01 PROCEDURE — 86850 RBC ANTIBODY SCREEN: CPT

## 2021-01-01 PROCEDURE — 84484 ASSAY OF TROPONIN QUANT: CPT

## 2021-01-01 PROCEDURE — 70450 CT HEAD/BRAIN W/O DYE: CPT

## 2021-01-01 PROCEDURE — 86901 BLOOD TYPING SEROLOGIC RH(D): CPT

## 2021-01-01 PROCEDURE — 86900 BLOOD TYPING SEROLOGIC ABO: CPT

## 2021-01-01 PROCEDURE — 93005 ELECTROCARDIOGRAM TRACING: CPT | Performed by: PHYSICIAN ASSISTANT

## 2021-01-01 PROCEDURE — 36415 COLL VENOUS BLD VENIPUNCTURE: CPT

## 2021-01-01 PROCEDURE — 87077 CULTURE AEROBIC IDENTIFY: CPT

## 2021-01-01 PROCEDURE — G8420 CALC BMI NORM PARAMETERS: HCPCS | Performed by: ORTHOPAEDIC SURGERY

## 2021-01-01 PROCEDURE — 73502 X-RAY EXAM HIP UNI 2-3 VIEWS: CPT

## 2021-01-01 PROCEDURE — 80053 COMPREHEN METABOLIC PANEL: CPT

## 2021-01-01 PROCEDURE — 1123F ACP DISCUSS/DSCN MKR DOCD: CPT | Performed by: ORTHOPAEDIC SURGERY

## 2021-01-01 PROCEDURE — G8484 FLU IMMUNIZE NO ADMIN: HCPCS | Performed by: ORTHOPAEDIC SURGERY

## 2021-01-01 PROCEDURE — 85025 COMPLETE CBC W/AUTO DIFF WBC: CPT

## 2021-01-01 PROCEDURE — 87086 URINE CULTURE/COLONY COUNT: CPT

## 2021-01-01 PROCEDURE — 99204 OFFICE O/P NEW MOD 45 MIN: CPT | Performed by: ORTHOPAEDIC SURGERY

## 2021-01-01 RX ORDER — ACETAMINOPHEN 500 MG
500 TABLET ORAL ONCE
Status: COMPLETED | OUTPATIENT
Start: 2021-01-01 | End: 2021-01-01

## 2021-01-01 RX ADMIN — ACETAMINOPHEN 500 MG: 500 TABLET ORAL at 18:08

## 2021-01-01 ASSESSMENT — ENCOUNTER SYMPTOMS
ABDOMINAL PAIN: 0
SHORTNESS OF BREATH: 0
DIARRHEA: 0
COUGH: 0
NAUSEA: 0
RHINORRHEA: 0
SORE THROAT: 0
EYE PAIN: 0
PHOTOPHOBIA: 0
VOMITING: 0
BACK PAIN: 0

## 2021-01-01 ASSESSMENT — PAIN DESCRIPTION - LOCATION: LOCATION: HIP

## 2021-01-01 ASSESSMENT — PAIN SCALES - GENERAL: PAINLEVEL_OUTOF10: 7

## 2021-02-14 NOTE — ED PROVIDER NOTES
[address] [facility] ED  eMERGENCY dEPARTMENTeNCOUnter      Pt Name: Natalie Spencer  MRN: [id number]  [place] 1934  Date ofevaluation: 2/14/2021  Provider: Jack Schafer Dr       Chief Complaint   Patient presents with    Fall         HISTORY OF PRESENT ILLNESS   (Location/Symptom, Timing/Onset,Context/Setting, Quality, Duration, Modifying Factors, Severity)  Note limiting factors. Natalie Spencer is a 80 y.o. female who presents to the emergency department for evaluation of mechanical fall from standing. Patient was at nursing home and had a fall. Patient has a history of dementia. Patient has a history of A. fib and is currently on anticoagulation. At one point patient states that she has pain to her left hip but then tells me that this is chronic secondary to past hip replacement. She states at one point she also had pain to her left shoulder but she does not anymore. Patient is a poor historian 2/2 to dementia she cannot tell me why she fell. Per nursing report she is unsteady on her feet, uses a cane and fell. This was unwitnessed but she was able to yell for help right after the fall. Pt says she did not pass out. HPI    NursingNotes were reviewed. REVIEW OF SYSTEMS    (2-9 systems for level 4, 10 or more for level 5)     Review of Systems   Constitutional: Negative for chills, diaphoresis, fatigue and fever. HENT: Negative for congestion, rhinorrhea and sore throat. Eyes: Negative for photophobia and pain. Respiratory: Negative for cough and shortness of breath. Cardiovascular: Negative for chest pain and palpitations. Gastrointestinal: Negative for abdominal pain, diarrhea, nausea and vomiting. Genitourinary: Negative for dysuria and flank pain. Musculoskeletal: Positive for arthralgias. Negative for back pain. Skin: Negative for rash. Neurological: Negative for dizziness, light-headedness and headaches.    Psychiatric/Behavioral: Negative. All other systems reviewed and are negative. Except as noted above the remainder of the review of systems was reviewed and negative. PAST MEDICAL HISTORY     Past Medical History:   Diagnosis Date    Anxiety     Atrial fibrillation ([facility] [address]     Atrial fibrillation (HCC)     Atrial fibrillation (HCC)     Chronic back pain     Dementia (HCC)     Depression     Encephalopathy     Essential tremor     Hypertension     Parkinsons disease ([facility] [address]     Pseudobulbar affect     Trigeminal neuralgia          SURGICALHISTORY       Past Surgical History:   Procedure Laterality Date    COLONOSCOPY  11/18/09    DR Ying Ramachandran    HYSTERECTOMY      SMALL INTESTINE SURGERY      PARTIAL    UPPER GASTROINTESTINAL ENDOSCOPY  5/20/09    DR DONALD         CURRENT MEDICATIONS       Discharge Medication List as of 2/14/2021  7:23 PM      CONTINUE these medications which have NOT CHANGED    Details   clonazePAM (KLONOPIN) 0.5 MG tablet Take 0.5 mg by mouth 2 times daily as needed. Historical Med      acetaminophen (TYLENOL) 500 MG tablet Take 500 mg by mouthHistorical Med      divalproex (DEPAKOTE SPRINKLE) 125 MG capsule Historical Med      metFORMIN (GLUCOPHAGE) 500 MG tablet Take 1,000 mg by mouthHistorical Med      risperiDONE (RISPERDAL) 1 MG tablet TAKE 1 TABLET BY MOUTH AT BEDTIME, Disp-90 tablet, R-0Normal      diltiazem (CARDIZEM CD) 120 MG extended release capsule Take 1 capsule by mouth daily, Disp-30 capsule, R-3Normal      metoprolol succinate (TOPROL XL) 100 MG extended release tablet Take 1 tablet by mouth 2 times daily, Disp-30 tablet, R-3Normal      QUEtiapine (SEROQUEL) 25 MG tablet Take 0.5 tablets by mouth 2 times daily, Disp-30 tablet, R-1Normal      warfarin (COUMADIN) 6 MG tablet Take 1 tablet by mouth See Admin Instructions ( INR 3.1 on 12/11/18)  TAKE 6 MG Tues, Thurs, Sat and TAKE 3 MG Sun, Mon, Wed, Fri  NEXT INR BLOOD DRAW 12/26/18, Disp-1 tablet, R-0**Patient requests 90 days supply**Print      digoxin (LANOXIN) 250 MCG tablet Take 250 mcg by mouth daily To = 0.25mgHistorical Med      furosemide (LASIX) 20 MG tablet Take 20 mg by mouth See Admin Instructions 3 times per week Mon, Wed, FriHistorical Med      fluticasone (FLONASE) 50 MCG/ACT nasal spray 2 sprays by Each Nare route 2 times daily as needed Historical Med      memantine (NAMENDA) 5 MG tablet Take 1 tablet by mouth 2 times daily, Disp-60 tablet, R-0Print      DULoxetine (CYMBALTA) 30 MG extended release capsule Take 30 mg by mouth nightly Historical Med      famotidine (PEPCID) 20 MG tablet TAKE 1 TABLET BY MOUTH TWICE DAILY, Disp-60 tablet, R-3             ALLERGIES     Donepezil and Codeine    FAMILY HISTORY     History reviewed. No pertinent family history.        SOCIAL HISTORY       Social History     Socioeconomic History    Marital status:      Spouse name: None    Number of children: None    Years of education: None    Highest education level: None   Occupational History    None   Social Needs    Financial resource strain: None    Food insecurity     Worry: None     Inability: None    Transportation needs     Medical: None     Non-medical: None   Tobacco Use    Smoking status: Never Smoker    Smokeless tobacco: Never Used   Substance and Sexual Activity    Alcohol use: No    Drug use: No    Sexual activity: Not Currently   Lifestyle    Physical activity     Days per week: None     Minutes per session: None    Stress: None   Relationships    Social connections     Talks on phone: None     Gets together: None     Attends Christianity service: None     Active member of club or organization: None     Attends meetings of clubs or organizations: None     Relationship status: None    Intimate partner violence     Fear of current or ex partner: None     Emotionally abused: None     Physically abused: None     Forced sexual activity: None   Other Topics Concern    None   Social History Narrative    None SCREENINGS   NIH Stroke Scale  Level of Consciousness (1a. ): Alert  LOC Questions (1b. ): Answers both correctly  LOC Commands (1c. ): Performs both tasks correctly  Best Gaze (2. ): Normal  Facial Palsy (4. ): Normal symmetrical movement  Motor Arm, Left (5a. ): No drift  Motor Arm, Right (5b. ): No drift  Motor Leg, Left (6a. ): No drift  Motor Leg, Right (6b. ): No drift  Limb Ataxia (7. ): Absent  Sensory (8. ): Normal  Best Language (9. ): No aphasia  Dysarthria (10. ): Normal  Extinction and Inattention (11): No abnormalityGlasgow Coma Scale  Eye Opening: Spontaneous  Best Verbal Response: Oriented  Best Motor Response: Obeys commands  Jan Coma Scale Score: 15 @FLOW([id number])@      PHYSICAL EXAM    (up to 7 for level 4, 8 or more for level 5)     ED Triage Vitals [02/14/21 1643]   BP Temp Temp src Pulse Resp SpO2 Height Weight   121/62 98.5 °F (36.9 °C) -- 71 19 -- 5' 3\" (1.6 m) 135 lb (61.2 kg)       Physical Exam  Vitals signs and nursing note reviewed. Constitutional:       General: She is not in acute distress. Appearance: Normal appearance. She is well-developed. She is not diaphoretic. HENT:      Head: Normocephalic and atraumatic. Eyes:      General: Lids are normal.      Conjunctiva/sclera: Conjunctivae normal.   Neck:      Musculoskeletal: Normal range of motion and neck supple. Cardiovascular:      Rate and Rhythm: Normal rate and regular rhythm. Pulses: Normal pulses. Heart sounds: Normal heart sounds. Pulmonary:      Effort: Pulmonary effort is normal.      Breath sounds: Normal breath sounds. Abdominal:      General: Bowel sounds are normal.      Palpations: Abdomen is soft. Tenderness: There is no abdominal tenderness.    Musculoskeletal:      Right shoulder: Normal.      Left shoulder: Normal.      Right elbow: Normal.     Left elbow: Normal.      Right wrist: Normal.      Left wrist: Normal.      Right hip: Normal.      Left hip: She exhibits tenderness and bony tenderness. Right knee: Normal.      Left knee: Normal.      Right ankle: Normal.      Left ankle: Normal.      Cervical back: Normal.      Thoracic back: Normal.      Lumbar back: Normal.        Legs:       Comments: Tenderness to lateral aspect of left thigh hip but patient states that it is normal.  She has full range of motion of her left hip left knee as well as right hip and right knee and right ankle. She has 2+ distal pulses. Sensation intact light touch. Good strength bilaterally. Lymphadenopathy:      Cervical: No cervical adenopathy. Skin:     General: Skin is warm and dry. Capillary Refill: Capillary refill takes less than 2 seconds. Findings: No rash. Neurological:      Mental Status: She is alert and oriented to person, place, and time. Psychiatric:         Thought Content: Thought content normal.         Judgment: Judgment normal.         DIAGNOSTIC RESULTS     EKG: All EKG's are interpreted by the Emergency Department Physician who either signs or Co-signsthis chart in the absence of a cardiologist.    Atrial fibrillation at 76 bpm with no acute ST changes    RADIOLOGY:   Non-plain filmimages such as CT, Ultrasound and MRI are read by the radiologist. Plain radiographic images are visualized and preliminarily interpreted by the emergency physician with the below findings:        Interpretation per the Radiologist below, if available at the time ofthis note:    XR SHOULDER LEFT (MIN 2 VIEWS)   Final Result      NO FRACTURE OR POSTTRAUMATIC COMPLICATION IDENTIFIED. XR HIP 2-3 VW W PELVIS LEFT   Final Result      NO FRACTURE, POSTTRAUMATIC COMPLICATION, OR SIGNIFICANT CHANGE FROM 6/12/2015 IDENTIFIED. XR CHEST PORTABLE   Final Result      NO EVIDENCE OF ACTIVE CARDIOPULMONARY DISEASE, OR ACUTE THORACIC TRAUMA IDENTIFIED.                CT HEAD WO CONTRAST   Final Result      No acute intracranial process/hemorrhage or significant interval change from prior. CT CERVICAL SPINE WO CONTRAST   Final Result      No acute fracture or traumatic malalignment. Multilevel degenerative changes cervical spine, worst at C5-C6, similar to prior. ED BEDSIDE ULTRASOUND:   Performed by ED Physician - none    LABS:  Labs Reviewed   PROTIME-INR - Abnormal; Notable for the following components:       Result Value    Protime 27.6 (*)     All other components within normal limits   COMPREHENSIVE METABOLIC PANEL - Abnormal; Notable for the following components:    Glucose 126 (*)     BUN 32 (*)     All other components within normal limits   CBC WITH AUTO DIFFERENTIAL - Abnormal; Notable for the following components:    MCHC 32.7 (*)     Platelets 210 (*)     All other components within normal limits   URINE RT REFLEX TO CULTURE - Abnormal; Notable for the following components:    Leukocyte Esterase, Urine MODERATE (*)     All other components within normal limits   MICROSCOPIC URINALYSIS - Abnormal; Notable for the following components:    WBC, UA 10-20 (*)     RBC, UA 3-5 (*)     All other components within normal limits   CULTURE, URINE   TROPONIN   TYPE AND SCREEN       All other labs were within normal range or not returned as of this dictation. EMERGENCY DEPARTMENT COURSE and DIFFERENTIAL DIAGNOSIS/MDM:   Vitals:    Vitals:    02/14/21 1643 02/14/21 1726 02/14/21 1833 02/14/21 1948   BP: 121/62 121/62 138/80 126/79   Pulse: 71 71 70 72   Resp: 19 19 19 19   Temp: 98.5 °F (36.9 °C) 98.5 °F (36.9 °C) 98.5 °F (36.9 °C)    SpO2:  99% 99% 98%   Weight: 135 lb (61.2 kg)      Height: 5' 3\" (1.6 m)          SAMANTHA Nevarez PA-C assumed care from [place], [place] at 6pm on 2/14/21 for follow up of labs and disposition. Pt is a 79 yo F who presents to the ED from NH with mechanical fall from standing. On arrival pt states she is not in pain. She is afebrile and hemodynamically stable. Pt was given po tylenol in the ED.  CMP remarkable for glucose 126, BUN 32. UA + for leukocyste esterase. No nitrates or bacteria. Do not suspect UTI, culture is pending. CT head and c-spine negative for acute abnormality. Xray L shoulder, CXR, and pelvis negative for acute abnormality. Pt is non toxic appearing with stable vitals. She is able to ambulate as usual with her cane. She is stable for discharge back to NH. Recommend patient to follow up with her orthopedic doctor, this will be communicated with NH on discharge. Pt agrees to plan. REASSESSMENT          CRITICAL CARE TIME   Total Critical Care time was 0 minutes, excluding separatelyreportable procedures. There was a high probability ofclinically significant/life threatening deterioration in the patient's condition which required my urgent intervention. CONSULTS:  None    PROCEDURES:  Unless otherwise noted below, none     Procedures    FINAL IMPRESSION      1. Fall, initial encounter    2.  Arthralgia, unspecified joint          DISPOSITION/PLAN   DISPOSITION        PATIENT REFERREDTO:  Jeff Kincaid MD  [facility], [address] [place] [address]  [phone]    Schedule an appointment as soon as possible for a visit   As needed, If symptoms worsen    [facility]) ED  [address] [facility]  [phone]  Go to   As needed, If symptoms worsen    [address]  Schedule an appointment as soon as possible for a visit in 1 day  [phone]      DISCHARGEMEDICATIONS:  Discharge Medication List as of 2/14/2021  7:23 PM             (Please note that portions of this note were completed with a voice recognition program.  Efforts were made to edit the dictations but occasionally words are mis-transcribed.)    Raul Fierro PA-C (electronically signed)           Rual Fierro PA-C  02/15/21 1941

## 2021-02-17 PROBLEM — S70.00XA CONTUSION OF HIP REGION: Status: ACTIVE | Noted: 2021-01-01

## 2021-02-17 NOTE — PROGRESS NOTES
Subjective:      Patient ID: Arthur Goyal is a 80 y.o. female who presents today for:  Chief Complaint   Patient presents with    Follow-up     ED follow up. Seen in ED 02/14/21/ left hip and leg are bothering her from her fall. 6/10 on pain scale. HPI    Per ED evaluation on 2/14/2021 by Gillian WHITAKER. This is the following as excerpted below. Arthur Goyal is a 80 y.o. female who presents to the emergency department for evaluation of mechanical fall from standing. Patient was at nursing home and had a fall. Patient has a history of dementia. Patient has a history of A. fib and is currently on anticoagulation. At one point patient states that she has pain to her left hip but then tells me that this is chronic secondary to past hip replacement. She states at one point she also had pain to her left shoulder but she does not anymore. Patient is a poor historian 2/2 to dementia she cannot tell me why she fell. Per nursing report she is unsteady on her feet, uses a cane and fell. This was unwitnessed but she was able to yell for help right after the fall. Pt says she did not pass out. None independent review of not only the ED note from 2/14/2021 but have also independently reviewed the x-rays from that visit of the left hip. X-rays are also dependent reviewed of her left shoulder. 2 views of her left shoulder demonstrate no acute fracture dislocation bony abnormality outside of some AC joint arthrosis.     Past Medical History:   Diagnosis Date    Anxiety     Atrial fibrillation (HCC)     Atrial fibrillation (HCC)     Atrial fibrillation (HCC)     Chronic back pain     Dementia (HCC)     Depression     Encephalopathy     Essential tremor     Hypertension     Parkinsons disease ([facility] [address]     Pseudobulbar affect     Trigeminal neuralgia      Past Surgical History:   Procedure Laterality Date    COLONOSCOPY  11/18/09    DR Leeann Weldon    HYSTERECTOMY      SMALL INTESTINE SURGERY      PARTIAL    UPPER GASTROINTESTINAL ENDOSCOPY  5/20/09    DR Hdz Sessions     Social History     Socioeconomic History    Marital status:      Spouse name: Not on file    Number of children: Not on file    Years of education: Not on file    Highest education level: Not on file   Occupational History    Not on file   Social Needs    Financial resource strain: Not on file    Food insecurity     Worry: Not on file     Inability: Not on file    Transportation needs     Medical: Not on file     Non-medical: Not on file   Tobacco Use    Smoking status: Never Smoker    Smokeless tobacco: Never Used   Substance and Sexual Activity    Alcohol use: No    Drug use: No    Sexual activity: Not Currently   Lifestyle    Physical activity     Days per week: Not on file     Minutes per session: Not on file    Stress: Not on file   Relationships    Social connections     Talks on phone: Not on file     Gets together: Not on file     Attends Bahai service: Not on file     Active member of club or organization: Not on file     Attends meetings of clubs or organizations: Not on file     Relationship status: Not on file    Intimate partner violence     Fear of current or ex partner: Not on file     Emotionally abused: Not on file     Physically abused: Not on file     Forced sexual activity: Not on file   Other Topics Concern    Not on file   Social History Narrative    Not on file     No family history on file. Allergies   Allergen Reactions    Donepezil Other (See Comments)    Codeine      Other reaction(s): GI Upset     Current Outpatient Medications on File Prior to Visit   Medication Sig Dispense Refill    clonazePAM (KLONOPIN) 0.5 MG tablet Take 0.5 mg by mouth 2 times daily as needed.       acetaminophen (TYLENOL) 500 MG tablet Take 500 mg by mouth      divalproex (DEPAKOTE SPRINKLE) 125 MG capsule       metFORMIN (GLUCOPHAGE) 500 MG tablet Take 1,000 mg by mouth      risperiDONE notes I think that is appropriate. Have discussed this with her  she does have some dementia. He understands that. Recommendation to be weightbearing as tolerated with assist in the nursing facility and work on her nutrition status. Follow-up with myself will be as needed. The patient and more importantly  is comfortable with the plan. No orders of the defined types were placed in this encounter. No orders of the defined types were placed in this encounter. No follow-ups on file.       Chairsse Rodríguez MD

## 2022-01-01 ENCOUNTER — APPOINTMENT (OUTPATIENT)
Dept: GENERAL RADIOLOGY | Age: 87
DRG: 871 | End: 2022-01-01
Payer: COMMERCIAL

## 2022-01-01 ENCOUNTER — HOSPITAL ENCOUNTER (INPATIENT)
Age: 87
LOS: 2 days | DRG: 871 | End: 2022-02-02
Attending: EMERGENCY MEDICINE | Admitting: INTERNAL MEDICINE
Payer: COMMERCIAL

## 2022-01-01 ENCOUNTER — APPOINTMENT (OUTPATIENT)
Dept: MRI IMAGING | Age: 87
DRG: 871 | End: 2022-01-01
Payer: COMMERCIAL

## 2022-01-01 ENCOUNTER — APPOINTMENT (OUTPATIENT)
Dept: INTERVENTIONAL RADIOLOGY/VASCULAR | Age: 87
DRG: 871 | End: 2022-01-01
Payer: COMMERCIAL

## 2022-01-01 VITALS
HEART RATE: 139 BPM | DIASTOLIC BLOOD PRESSURE: 92 MMHG | OXYGEN SATURATION: 90 % | WEIGHT: 122.01 LBS | HEIGHT: 63 IN | SYSTOLIC BLOOD PRESSURE: 156 MMHG | TEMPERATURE: 97.5 F | BODY MASS INDEX: 21.62 KG/M2 | RESPIRATION RATE: 20 BRPM

## 2022-01-01 DIAGNOSIS — R41.82 ALTERED MENTAL STATUS, UNSPECIFIED ALTERED MENTAL STATUS TYPE: ICD-10-CM

## 2022-01-01 DIAGNOSIS — J18.9 PNEUMONIA OF RIGHT LOWER LOBE DUE TO INFECTIOUS ORGANISM: Primary | ICD-10-CM

## 2022-01-01 DIAGNOSIS — A41.9 SEPTICEMIA (HCC): ICD-10-CM

## 2022-01-01 DIAGNOSIS — E86.0 DEHYDRATION: ICD-10-CM

## 2022-01-01 LAB
ACINETOBACTER CALCOAC BAUMANNII COMPLEX BY PCR: NOT DETECTED
ALBUMIN SERPL-MCNC: 2.3 G/DL (ref 3.5–4.6)
ALBUMIN SERPL-MCNC: 2.7 G/DL (ref 3.5–4.6)
ALBUMIN SERPL-MCNC: 3 G/DL (ref 3.5–4.6)
ALP BLD-CCNC: 106 U/L (ref 40–130)
ALP BLD-CCNC: 43 U/L (ref 40–130)
ALP BLD-CCNC: 51 U/L (ref 40–130)
ALT SERPL-CCNC: 12 U/L (ref 0–33)
ALT SERPL-CCNC: 12 U/L (ref 0–33)
ALT SERPL-CCNC: 28 U/L (ref 0–33)
ANION GAP SERPL CALCULATED.3IONS-SCNC: 11 MEQ/L (ref 9–15)
ANION GAP SERPL CALCULATED.3IONS-SCNC: 16 MEQ/L (ref 9–15)
ANION GAP SERPL CALCULATED.3IONS-SCNC: 16 MEQ/L (ref 9–15)
ANION GAP SERPL CALCULATED.3IONS-SCNC: 18 MEQ/L (ref 9–15)
ANISOCYTOSIS: ABNORMAL
AST SERPL-CCNC: 27 U/L (ref 0–35)
AST SERPL-CCNC: 33 U/L (ref 0–35)
AST SERPL-CCNC: 55 U/L (ref 0–35)
BACTERIA: ABNORMAL /HPF
BACTEROIDES FRAGILIS BY PCR: NOT DETECTED
BANDED NEUTROPHILS RELATIVE PERCENT: 10 % (ref 5–11)
BANDED NEUTROPHILS RELATIVE PERCENT: 4 % (ref 5–11)
BASE EXCESS ARTERIAL: -1 (ref -3–3)
BASE EXCESS ARTERIAL: 0 (ref -3–3)
BASOPHILS ABSOLUTE: 0 K/UL (ref 0–0.2)
BASOPHILS ABSOLUTE: 0 K/UL (ref 0–0.2)
BASOPHILS ABSOLUTE: 0.1 K/UL (ref 0–0.2)
BASOPHILS RELATIVE PERCENT: 0 %
BASOPHILS RELATIVE PERCENT: 0 %
BASOPHILS RELATIVE PERCENT: 0.2 %
BILIRUB SERPL-MCNC: 0.7 MG/DL (ref 0.2–0.7)
BILIRUB SERPL-MCNC: 1.2 MG/DL (ref 0.2–0.7)
BILIRUB SERPL-MCNC: 2.2 MG/DL (ref 0.2–0.7)
BILIRUBIN URINE: ABNORMAL
BLOOD, URINE: ABNORMAL
BUN BLDV-MCNC: 42 MG/DL (ref 8–23)
BUN BLDV-MCNC: 52 MG/DL (ref 8–23)
BUN BLDV-MCNC: 66 MG/DL (ref 8–23)
BUN BLDV-MCNC: 74 MG/DL (ref 8–23)
CALCIUM IONIZED: 1.25 MMOL/L (ref 1.12–1.32)
CALCIUM IONIZED: 1.42 MMOL/L (ref 1.12–1.32)
CALCIUM SERPL-MCNC: 10.7 MG/DL (ref 8.5–9.9)
CALCIUM SERPL-MCNC: 9 MG/DL (ref 8.5–9.9)
CALCIUM SERPL-MCNC: 9.4 MG/DL (ref 8.5–9.9)
CALCIUM SERPL-MCNC: 9.4 MG/DL (ref 8.5–9.9)
CANDIDA ALBICANS BY PCR: NOT DETECTED
CANDIDA AURIS BY PCR: NOT DETECTED
CANDIDA GLABRATA BY PCR: NOT DETECTED
CANDIDA KRUSEI BY PCR: NOT DETECTED
CANDIDA PARAPSILOSIS BY PCR: NOT DETECTED
CANDIDA TROPICALIS BY PCR: NOT DETECTED
CHLORIDE BLD-SCNC: 110 MEQ/L (ref 95–107)
CHLORIDE BLD-SCNC: 110 MEQ/L (ref 95–107)
CHLORIDE BLD-SCNC: 114 MEQ/L (ref 95–107)
CHLORIDE BLD-SCNC: 116 MEQ/L (ref 95–107)
CLARITY: ABNORMAL
CO2: 19 MEQ/L (ref 20–31)
CO2: 20 MEQ/L (ref 20–31)
CO2: 22 MEQ/L (ref 20–31)
CO2: 23 MEQ/L (ref 20–31)
COLOR: ABNORMAL
CREAT SERPL-MCNC: 0.81 MG/DL (ref 0.5–0.9)
CREAT SERPL-MCNC: 1.07 MG/DL (ref 0.5–0.9)
CREAT SERPL-MCNC: 1.43 MG/DL (ref 0.5–0.9)
CREAT SERPL-MCNC: 1.76 MG/DL (ref 0.5–0.9)
CRYPTOCOCCUS NEOFORMANS/GATTII BY PCR: NOT DETECTED
DIGOXIN LEVEL: 0.8 NG/ML (ref 0.8–2)
EKG ATRIAL RATE: 182 BPM
EKG Q-T INTERVAL: 272 MS
EKG QRS DURATION: 84 MS
EKG QTC CALCULATION (BAZETT): 429 MS
EKG R AXIS: 20 DEGREES
EKG T AXIS: 200 DEGREES
EKG VENTRICULAR RATE: 150 BPM
ENTEROBACTER CLOACAE COMPLEX BY PCR: NOT DETECTED
ENTEROBACTERALES BY PCR: NOT DETECTED
ENTEROCOCCUS FAECALIS BY PCR: NOT DETECTED
ENTEROCOCCUS FAECIUM BY PCR: NOT DETECTED
EOSINOPHILS ABSOLUTE: 0 K/UL (ref 0–0.7)
EOSINOPHILS RELATIVE PERCENT: 0 %
EPITHELIAL CELLS, UA: ABNORMAL /HPF (ref 0–5)
ESCHERICHIA COLI BY PCR: NOT DETECTED
GFR AFRICAN AMERICAN: 30
GFR AFRICAN AMERICAN: 33
GFR AFRICAN AMERICAN: 41.9
GFR AFRICAN AMERICAN: 58.5
GFR AFRICAN AMERICAN: >60
GFR AFRICAN AMERICAN: >60
GFR NON-AFRICAN AMERICAN: 25
GFR NON-AFRICAN AMERICAN: 27.2
GFR NON-AFRICAN AMERICAN: 34.6
GFR NON-AFRICAN AMERICAN: 48.4
GFR NON-AFRICAN AMERICAN: 59
GFR NON-AFRICAN AMERICAN: >60
GLOBULIN: 3.8 G/DL (ref 2.3–3.5)
GLOBULIN: 3.9 G/DL (ref 2.3–3.5)
GLOBULIN: 4.8 G/DL (ref 2.3–3.5)
GLUCOSE BLD-MCNC: 229 MG/DL (ref 70–99)
GLUCOSE BLD-MCNC: 246 MG/DL (ref 70–99)
GLUCOSE BLD-MCNC: 247 MG/DL (ref 70–99)
GLUCOSE BLD-MCNC: 254 MG/DL (ref 70–99)
GLUCOSE BLD-MCNC: 289 MG/DL (ref 70–99)
GLUCOSE BLD-MCNC: 320 MG/DL (ref 70–99)
GLUCOSE BLD-MCNC: 327 MG/DL (ref 70–99)
GLUCOSE BLD-MCNC: 341 MG/DL (ref 70–99)
GLUCOSE BLD-MCNC: 348 MG/DL (ref 70–99)
GLUCOSE BLD-MCNC: 352 MG/DL (ref 70–99)
GLUCOSE BLD-MCNC: 356 MG/DL (ref 70–99)
GLUCOSE BLD-MCNC: 356 MG/DL (ref 70–99)
GLUCOSE BLD-MCNC: 380 MG/DL (ref 70–99)
GLUCOSE BLD-MCNC: 384 MG/DL (ref 70–99)
GLUCOSE BLD-MCNC: 430 MG/DL (ref 70–99)
GLUCOSE URINE: NEGATIVE MG/DL
HAEMOPHILUS INFLUENZAE BY PCR: NOT DETECTED
HBA1C MFR BLD: 7 % (ref 4.8–5.9)
HCO3 ARTERIAL: 24.8 MMOL/L (ref 21–29)
HCO3 ARTERIAL: 27 MMOL/L (ref 21–29)
HCT VFR BLD CALC: 39.5 % (ref 37–47)
HCT VFR BLD CALC: 42.8 % (ref 37–47)
HCT VFR BLD CALC: 48.3 % (ref 37–47)
HEMATOLOGY PATH CONSULT: NO
HEMOGLOBIN: 12.8 G/DL (ref 12–16)
HEMOGLOBIN: 13.6 G/DL (ref 12–16)
HEMOGLOBIN: 15.7 GM/DL (ref 12–16)
HEMOGLOBIN: 15.7 GM/DL (ref 12–16)
HEMOGLOBIN: 16 G/DL (ref 12–16)
HYALINE CASTS: ABNORMAL /LPF (ref 0–5)
INR BLD: 1.3
KETONES, URINE: ABNORMAL MG/DL
KLEBSIELLA AEROGENES BY PCR: NOT DETECTED
KLEBSIELLA OXYTOCA BY PCR: NOT DETECTED
KLEBSIELLA PNEUMONIAE GROUP BY PCR: NOT DETECTED
LACTATE: 2.68 MMOL/L (ref 0.4–2)
LACTATE: 2.85 MMOL/L (ref 0.4–2)
LACTIC ACID, SEPSIS: 3.1 MMOL/L (ref 0.5–1.9)
LACTIC ACID, SEPSIS: 3.9 MMOL/L (ref 0.5–1.9)
LACTIC ACID: 5 MMOL/L (ref 0.5–2.2)
LEUKOCYTE ESTERASE, URINE: ABNORMAL
LISTERIA MONOCYTOGENES BY PCR: NOT DETECTED
LV EF: 50 %
LVEF MODALITY: NORMAL
LYMPHOCYTES ABSOLUTE: 0.4 K/UL (ref 1–4.8)
LYMPHOCYTES ABSOLUTE: 0.6 K/UL (ref 1–4.8)
LYMPHOCYTES ABSOLUTE: 0.6 K/UL (ref 1–4.8)
LYMPHOCYTES RELATIVE PERCENT: 3 %
LYMPHOCYTES RELATIVE PERCENT: 9 %
LYMPHOCYTES RELATIVE PERCENT: 9 %
MAGNESIUM: 2 MG/DL (ref 1.7–2.4)
MAGNESIUM: 2.2 MG/DL (ref 1.7–2.4)
MCH RBC QN AUTO: 30 PG (ref 27–31.3)
MCH RBC QN AUTO: 30.1 PG (ref 27–31.3)
MCH RBC QN AUTO: 30.6 PG (ref 27–31.3)
MCHC RBC AUTO-ENTMCNC: 31.7 % (ref 33–37)
MCHC RBC AUTO-ENTMCNC: 32.3 % (ref 33–37)
MCHC RBC AUTO-ENTMCNC: 33.2 % (ref 33–37)
MCV RBC AUTO: 92.4 FL (ref 82–100)
MCV RBC AUTO: 93.2 FL (ref 82–100)
MCV RBC AUTO: 94.7 FL (ref 82–100)
MONOCYTES ABSOLUTE: 0.1 K/UL (ref 0.2–0.8)
MONOCYTES ABSOLUTE: 0.1 K/UL (ref 0.2–0.8)
MONOCYTES ABSOLUTE: 0.4 K/UL (ref 0.2–0.8)
MONOCYTES RELATIVE PERCENT: 0.5 %
MONOCYTES RELATIVE PERCENT: 1 %
MONOCYTES RELATIVE PERCENT: 9 %
NEISSERIA MENINGITIDIS BY PCR: NOT DETECTED
NEUTROPHILS ABSOLUTE: 20.6 K/UL (ref 1.4–6.5)
NEUTROPHILS ABSOLUTE: 3.9 K/UL (ref 1.4–6.5)
NEUTROPHILS ABSOLUTE: 6.4 K/UL (ref 1.4–6.5)
NEUTROPHILS RELATIVE PERCENT: 78 %
NEUTROPHILS RELATIVE PERCENT: 80 %
NEUTROPHILS RELATIVE PERCENT: 96.3 %
NITRITE, URINE: POSITIVE
O2 SAT, ARTERIAL: 100 % (ref 93–100)
O2 SAT, ARTERIAL: 60 % (ref 93–100)
PCO2 ARTERIAL: 41 MM HG (ref 35–45)
PCO2 ARTERIAL: 72 MM HG (ref 35–45)
PDW BLD-RTO: 14.4 % (ref 11.5–14.5)
PDW BLD-RTO: 14.5 % (ref 11.5–14.5)
PDW BLD-RTO: 14.8 % (ref 11.5–14.5)
PERFORMED ON: ABNORMAL
PH ARTERIAL: 7.18 (ref 7.35–7.45)
PH ARTERIAL: 7.39 (ref 7.35–7.45)
PH UA: 5 (ref 5–9)
PHOSPHORUS: 2.4 MG/DL (ref 2.3–4.8)
PLATELET # BLD: 106 K/UL (ref 130–400)
PLATELET # BLD: 116 K/UL (ref 130–400)
PLATELET # BLD: 89 K/UL (ref 130–400)
PLATELET SLIDE REVIEW: ADEQUATE
PO2 ARTERIAL: 291 MM HG (ref 75–108)
PO2 ARTERIAL: 40 MM HG (ref 75–108)
POC CHLORIDE: 111 MEQ/L (ref 99–110)
POC CHLORIDE: 118 MEQ/L (ref 99–110)
POC CREATININE: 0.9 MG/DL (ref 0.6–1.2)
POC CREATININE: 1.9 MG/DL (ref 0.6–1.2)
POC FIO2: 100
POC FIO2: 4
POC HEMATOCRIT: 46 % (ref 36–48)
POC HEMATOCRIT: 46 % (ref 36–48)
POC POTASSIUM: 3.2 MEQ/L (ref 3.5–5.1)
POC POTASSIUM: 6.7 MEQ/L (ref 3.5–5.1)
POC SAMPLE TYPE: ABNORMAL
POC SAMPLE TYPE: ABNORMAL
POC SODIUM: 143 MEQ/L (ref 136–145)
POC SODIUM: 154 MEQ/L (ref 136–145)
POTASSIUM REFLEX MAGNESIUM: 3.4 MEQ/L (ref 3.4–4.9)
POTASSIUM REFLEX MAGNESIUM: 4.2 MEQ/L (ref 3.4–4.9)
POTASSIUM SERPL-SCNC: 4.2 MEQ/L (ref 3.4–4.9)
POTASSIUM SERPL-SCNC: 5.5 MEQ/L (ref 3.4–4.9)
PROCALCITONIN: 19.36 NG/ML (ref 0–0.15)
PROCALCITONIN: 4.99 NG/ML (ref 0–0.15)
PROTEIN UA: >=300 MG/DL
PROTEUS SPECIES BY PCR: NOT DETECTED
PROTHROMBIN TIME: 16.4 SEC (ref 12.3–14.9)
PSEUDOMONAS AERUGINOSA BY PCR: NOT DETECTED
RBC # BLD: 4.24 M/UL (ref 4.2–5.4)
RBC # BLD: 4.52 M/UL (ref 4.2–5.4)
RBC # BLD: 5.23 M/UL (ref 4.2–5.4)
RBC # BLD: NORMAL 10*6/UL
RBC UA: ABNORMAL /HPF (ref 0–5)
SALMONELLA SPECIES BY PCR: NOT DETECTED
SARS-COV-2, NAAT: NOT DETECTED
SERRATIA MARCESCENS BY PCR: NOT DETECTED
SLIDE REVIEW: ABNORMAL
SODIUM BLD-SCNC: 144 MEQ/L (ref 135–144)
SODIUM BLD-SCNC: 149 MEQ/L (ref 135–144)
SODIUM BLD-SCNC: 150 MEQ/L (ref 135–144)
SODIUM BLD-SCNC: 152 MEQ/L (ref 135–144)
SPECIFIC GRAVITY UA: 1.03 (ref 1–1.03)
STAPHYLOCOCCUS AUREUS BY PCR: NOT DETECTED
STAPHYLOCOCCUS EPIDERMIDIS BY PCR: NOT DETECTED
STAPHYLOCOCCUS LUGDUNENSIS BY PCR: NOT DETECTED
STAPHYLOCOCCUS SPECIES BY PCR: NOT DETECTED
STENOTROPHOMONAS MALTOPHILIA BY PCR: NOT DETECTED
STREPTOCOCCUS AGALACTIAE BY PCR: NOT DETECTED
STREPTOCOCCUS PNEUMONIAE BY PCR: DETECTED
STREPTOCOCCUS PYOGENES  BY PCR: NOT DETECTED
STREPTOCOCCUS SPECIES BY PCR: DETECTED
TCO2 ARTERIAL: 26 MMOL/L (ref 21–32)
TCO2 ARTERIAL: 29 MMOL/L (ref 21–32)
TOTAL PROTEIN: 6.1 G/DL (ref 6.3–8)
TOTAL PROTEIN: 6.6 G/DL (ref 6.3–8)
TOTAL PROTEIN: 7.8 G/DL (ref 6.3–8)
TROPONIN: 0.07 NG/ML (ref 0–0.01)
URINE REFLEX TO CULTURE: ABNORMAL
UROBILINOGEN, URINE: 2 E.U./DL
VANCOMYCIN RANDOM: <4 UG/ML (ref 10–40)
WBC # BLD: 21.4 K/UL (ref 4.8–10.8)
WBC # BLD: 4.8 K/UL (ref 4.8–10.8)
WBC # BLD: 7.1 K/UL (ref 4.8–10.8)
WBC UA: ABNORMAL /HPF (ref 0–5)

## 2022-01-01 PROCEDURE — 6370000000 HC RX 637 (ALT 250 FOR IP): Performed by: INTERNAL MEDICINE

## 2022-01-01 PROCEDURE — 82565 ASSAY OF CREATININE: CPT

## 2022-01-01 PROCEDURE — 83605 ASSAY OF LACTIC ACID: CPT

## 2022-01-01 PROCEDURE — 94660 CPAP INITIATION&MGMT: CPT

## 2022-01-01 PROCEDURE — 85014 HEMATOCRIT: CPT

## 2022-01-01 PROCEDURE — 85610 PROTHROMBIN TIME: CPT

## 2022-01-01 PROCEDURE — 82803 BLOOD GASES ANY COMBINATION: CPT

## 2022-01-01 PROCEDURE — 82330 ASSAY OF CALCIUM: CPT

## 2022-01-01 PROCEDURE — 36600 WITHDRAWAL OF ARTERIAL BLOOD: CPT

## 2022-01-01 PROCEDURE — 82435 ASSAY OF BLOOD CHLORIDE: CPT

## 2022-01-01 PROCEDURE — 43752 NASAL/OROGASTRIC W/TUBE PLMT: CPT | Performed by: RADIOLOGY

## 2022-01-01 PROCEDURE — 2500000003 HC RX 250 WO HCPCS: Performed by: INTERNAL MEDICINE

## 2022-01-01 PROCEDURE — 6360000002 HC RX W HCPCS: Performed by: INTERNAL MEDICINE

## 2022-01-01 PROCEDURE — 84484 ASSAY OF TROPONIN QUANT: CPT

## 2022-01-01 PROCEDURE — 85025 COMPLETE CBC W/AUTO DIFF WBC: CPT

## 2022-01-01 PROCEDURE — 2060000000 HC ICU INTERMEDIATE R&B

## 2022-01-01 PROCEDURE — 87635 SARS-COV-2 COVID-19 AMP PRB: CPT

## 2022-01-01 PROCEDURE — 2580000003 HC RX 258: Performed by: INTERNAL MEDICINE

## 2022-01-01 PROCEDURE — 99221 1ST HOSP IP/OBS SF/LOW 40: CPT | Performed by: INTERNAL MEDICINE

## 2022-01-01 PROCEDURE — 2700000000 HC OXYGEN THERAPY PER DAY

## 2022-01-01 PROCEDURE — 80202 ASSAY OF VANCOMYCIN: CPT

## 2022-01-01 PROCEDURE — 43752 NASAL/OROGASTRIC W/TUBE PLMT: CPT

## 2022-01-01 PROCEDURE — 84132 ASSAY OF SERUM POTASSIUM: CPT

## 2022-01-01 PROCEDURE — 84295 ASSAY OF SERUM SODIUM: CPT

## 2022-01-01 PROCEDURE — 36415 COLL VENOUS BLD VENIPUNCTURE: CPT

## 2022-01-01 PROCEDURE — 87040 BLOOD CULTURE FOR BACTERIA: CPT

## 2022-01-01 PROCEDURE — 99222 1ST HOSP IP/OBS MODERATE 55: CPT | Performed by: NURSE PRACTITIONER

## 2022-01-01 PROCEDURE — 71045 X-RAY EXAM CHEST 1 VIEW: CPT

## 2022-01-01 PROCEDURE — 99223 1ST HOSP IP/OBS HIGH 75: CPT | Performed by: INTERNAL MEDICINE

## 2022-01-01 PROCEDURE — 6360000002 HC RX W HCPCS: Performed by: EMERGENCY MEDICINE

## 2022-01-01 PROCEDURE — 99285 EMERGENCY DEPT VISIT HI MDM: CPT

## 2022-01-01 PROCEDURE — 2580000003 HC RX 258: Performed by: EMERGENCY MEDICINE

## 2022-01-01 PROCEDURE — 2500000003 HC RX 250 WO HCPCS: Performed by: EMERGENCY MEDICINE

## 2022-01-01 PROCEDURE — 99233 SBSQ HOSP IP/OBS HIGH 50: CPT | Performed by: INTERNAL MEDICINE

## 2022-01-01 PROCEDURE — 99222 1ST HOSP IP/OBS MODERATE 55: CPT | Performed by: INTERNAL MEDICINE

## 2022-01-01 PROCEDURE — 96365 THER/PROPH/DIAG IV INF INIT: CPT

## 2022-01-01 PROCEDURE — 6370000000 HC RX 637 (ALT 250 FOR IP): Performed by: EMERGENCY MEDICINE

## 2022-01-01 PROCEDURE — 80053 COMPREHEN METABOLIC PANEL: CPT

## 2022-01-01 PROCEDURE — 83735 ASSAY OF MAGNESIUM: CPT

## 2022-01-01 PROCEDURE — 83036 HEMOGLOBIN GLYCOSYLATED A1C: CPT

## 2022-01-01 PROCEDURE — 81001 URINALYSIS AUTO W/SCOPE: CPT

## 2022-01-01 PROCEDURE — 93306 TTE W/DOPPLER COMPLETE: CPT

## 2022-01-01 PROCEDURE — 84145 PROCALCITONIN (PCT): CPT

## 2022-01-01 PROCEDURE — 84100 ASSAY OF PHOSPHORUS: CPT

## 2022-01-01 PROCEDURE — 93010 ELECTROCARDIOGRAM REPORT: CPT | Performed by: INTERNAL MEDICINE

## 2022-01-01 PROCEDURE — 93005 ELECTROCARDIOGRAM TRACING: CPT | Performed by: EMERGENCY MEDICINE

## 2022-01-01 PROCEDURE — 87186 SC STD MICRODIL/AGAR DIL: CPT

## 2022-01-01 PROCEDURE — 94761 N-INVAS EAR/PLS OXIMETRY MLT: CPT

## 2022-01-01 PROCEDURE — 87150 DNA/RNA AMPLIFIED PROBE: CPT

## 2022-01-01 PROCEDURE — 70551 MRI BRAIN STEM W/O DYE: CPT

## 2022-01-01 PROCEDURE — 0DH67UZ INSERTION OF FEEDING DEVICE INTO STOMACH, VIA NATURAL OR ARTIFICIAL OPENING: ICD-10-PCS | Performed by: RADIOLOGY

## 2022-01-01 PROCEDURE — 3E0G76Z INTRODUCTION OF NUTRITIONAL SUBSTANCE INTO UPPER GI, VIA NATURAL OR ARTIFICIAL OPENING: ICD-10-PCS | Performed by: RADIOLOGY

## 2022-01-01 PROCEDURE — 80162 ASSAY OF DIGOXIN TOTAL: CPT

## 2022-01-01 PROCEDURE — 2709999900 IR PLACE NG TUBE BY DR W FLUORO

## 2022-01-01 PROCEDURE — 96360 HYDRATION IV INFUSION INIT: CPT

## 2022-01-01 PROCEDURE — 87077 CULTURE AEROBIC IDENTIFY: CPT

## 2022-01-01 RX ORDER — DILTIAZEM HYDROCHLORIDE 5 MG/ML
10 INJECTION INTRAVENOUS ONCE
Status: COMPLETED | OUTPATIENT
Start: 2022-01-01 | End: 2022-01-01

## 2022-01-01 RX ORDER — DEXTROSE, SODIUM CHLORIDE, AND POTASSIUM CHLORIDE 5; .45; .15 G/100ML; G/100ML; G/100ML
INJECTION INTRAVENOUS CONTINUOUS
Status: DISCONTINUED | OUTPATIENT
Start: 2022-01-01 | End: 2022-01-01

## 2022-01-01 RX ORDER — SODIUM CHLORIDE 9 MG/ML
25 INJECTION, SOLUTION INTRAVENOUS PRN
Status: DISCONTINUED | OUTPATIENT
Start: 2022-01-01 | End: 2022-01-01 | Stop reason: HOSPADM

## 2022-01-01 RX ORDER — SODIUM CHLORIDE 0.9 % (FLUSH) 0.9 %
5-40 SYRINGE (ML) INJECTION EVERY 12 HOURS SCHEDULED
Status: DISCONTINUED | OUTPATIENT
Start: 2022-01-01 | End: 2022-01-01 | Stop reason: HOSPADM

## 2022-01-01 RX ORDER — ACETAMINOPHEN 325 MG/1
650 TABLET ORAL EVERY 6 HOURS PRN
Status: DISCONTINUED | OUTPATIENT
Start: 2022-01-01 | End: 2022-01-01 | Stop reason: HOSPADM

## 2022-01-01 RX ORDER — INSULIN GLARGINE 100 [IU]/ML
10 INJECTION, SOLUTION SUBCUTANEOUS NIGHTLY
Status: DISCONTINUED | OUTPATIENT
Start: 2022-01-01 | End: 2022-01-01

## 2022-01-01 RX ORDER — DIVALPROEX SODIUM 125 MG/1
125 CAPSULE, COATED PELLETS ORAL EVERY 8 HOURS SCHEDULED
Status: DISCONTINUED | OUTPATIENT
Start: 2022-01-01 | End: 2022-01-01 | Stop reason: HOSPADM

## 2022-01-01 RX ORDER — POTASSIUM CHLORIDE 7.45 MG/ML
10 INJECTION INTRAVENOUS PRN
Status: DISCONTINUED | OUTPATIENT
Start: 2022-01-01 | End: 2022-01-01 | Stop reason: HOSPADM

## 2022-01-01 RX ORDER — CEFTRIAXONE 2 G/1
2 INJECTION, POWDER, FOR SOLUTION INTRAMUSCULAR; INTRAVENOUS EVERY MORNING
COMMUNITY

## 2022-01-01 RX ORDER — LORAZEPAM 2 MG/ML
0.5 INJECTION INTRAMUSCULAR ONCE
Status: COMPLETED | OUTPATIENT
Start: 2022-01-01 | End: 2022-01-01

## 2022-01-01 RX ORDER — DEXTROSE MONOHYDRATE 50 MG/ML
100 INJECTION, SOLUTION INTRAVENOUS PRN
Status: DISCONTINUED | OUTPATIENT
Start: 2022-01-01 | End: 2022-01-01 | Stop reason: HOSPADM

## 2022-01-01 RX ORDER — SODIUM CHLORIDE 0.9 % (FLUSH) 0.9 %
5-40 SYRINGE (ML) INJECTION PRN
Status: DISCONTINUED | OUTPATIENT
Start: 2022-01-01 | End: 2022-01-01 | Stop reason: HOSPADM

## 2022-01-01 RX ORDER — OLANZAPINE 2.5 MG/1
2.5 TABLET ORAL 2 TIMES DAILY
COMMUNITY

## 2022-01-01 RX ORDER — ONDANSETRON 2 MG/ML
4 INJECTION INTRAMUSCULAR; INTRAVENOUS EVERY 6 HOURS PRN
Status: DISCONTINUED | OUTPATIENT
Start: 2022-01-01 | End: 2022-01-01 | Stop reason: HOSPADM

## 2022-01-01 RX ORDER — METOPROLOL TARTRATE 50 MG/1
100 TABLET, FILM COATED ORAL 2 TIMES DAILY
Status: DISCONTINUED | OUTPATIENT
Start: 2022-01-01 | End: 2022-01-01 | Stop reason: HOSPADM

## 2022-01-01 RX ORDER — DILTIAZEM HYDROCHLORIDE 60 MG/1
60 TABLET, FILM COATED ORAL EVERY 6 HOURS SCHEDULED
Status: DISCONTINUED | OUTPATIENT
Start: 2022-01-01 | End: 2022-01-01 | Stop reason: HOSPADM

## 2022-01-01 RX ORDER — 0.9 % SODIUM CHLORIDE 0.9 %
1000 INTRAVENOUS SOLUTION INTRAVENOUS ONCE
Status: COMPLETED | OUTPATIENT
Start: 2022-01-01 | End: 2022-01-01

## 2022-01-01 RX ORDER — METOPROLOL TARTRATE 5 MG/5ML
5 INJECTION INTRAVENOUS EVERY 6 HOURS PRN
Status: DISCONTINUED | OUTPATIENT
Start: 2022-01-01 | End: 2022-01-01 | Stop reason: HOSPADM

## 2022-01-01 RX ORDER — ACETAMINOPHEN 650 MG/1
650 SUPPOSITORY RECTAL EVERY 6 HOURS PRN
Status: DISCONTINUED | OUTPATIENT
Start: 2022-01-01 | End: 2022-01-01 | Stop reason: HOSPADM

## 2022-01-01 RX ORDER — MAGNESIUM SULFATE IN WATER 40 MG/ML
2000 INJECTION, SOLUTION INTRAVENOUS PRN
Status: DISCONTINUED | OUTPATIENT
Start: 2022-01-01 | End: 2022-01-01 | Stop reason: HOSPADM

## 2022-01-01 RX ORDER — METOPROLOL TARTRATE 50 MG/1
50 TABLET, FILM COATED ORAL 2 TIMES DAILY
Status: DISCONTINUED | OUTPATIENT
Start: 2022-01-01 | End: 2022-01-01

## 2022-01-01 RX ORDER — NICOTINE POLACRILEX 4 MG
15 LOZENGE BUCCAL PRN
Status: DISCONTINUED | OUTPATIENT
Start: 2022-01-01 | End: 2022-01-01 | Stop reason: HOSPADM

## 2022-01-01 RX ORDER — INSULIN GLARGINE 100 [IU]/ML
40 INJECTION, SOLUTION SUBCUTANEOUS NIGHTLY
Status: DISCONTINUED | OUTPATIENT
Start: 2022-01-01 | End: 2022-01-01 | Stop reason: HOSPADM

## 2022-01-01 RX ORDER — POLYETHYLENE GLYCOL 3350 17 G/17G
17 POWDER, FOR SOLUTION ORAL DAILY PRN
Status: DISCONTINUED | OUTPATIENT
Start: 2022-01-01 | End: 2022-01-01 | Stop reason: HOSPADM

## 2022-01-01 RX ORDER — DEXTROSE MONOHYDRATE 25 G/50ML
12.5 INJECTION, SOLUTION INTRAVENOUS PRN
Status: DISCONTINUED | OUTPATIENT
Start: 2022-01-01 | End: 2022-01-01 | Stop reason: RX

## 2022-01-01 RX ORDER — DILTIAZEM HYDROCHLORIDE 240 MG/1
240 CAPSULE, COATED, EXTENDED RELEASE ORAL DAILY
Status: DISCONTINUED | OUTPATIENT
Start: 2022-01-01 | End: 2022-01-01

## 2022-01-01 RX ORDER — ONDANSETRON 4 MG/1
4 TABLET, ORALLY DISINTEGRATING ORAL EVERY 8 HOURS PRN
Status: DISCONTINUED | OUTPATIENT
Start: 2022-01-01 | End: 2022-01-01 | Stop reason: HOSPADM

## 2022-01-01 RX ORDER — SODIUM CHLORIDE, SODIUM LACTATE, POTASSIUM CHLORIDE, AND CALCIUM CHLORIDE .6; .31; .03; .02 G/100ML; G/100ML; G/100ML; G/100ML
30 INJECTION, SOLUTION INTRAVENOUS ONCE
Status: COMPLETED | OUTPATIENT
Start: 2022-01-01 | End: 2022-01-01

## 2022-01-01 RX ADMIN — Medication 10 ML: at 20:05

## 2022-01-01 RX ADMIN — PIPERACILLIN AND TAZOBACTAM 3375 MG: 3; .375 INJECTION, POWDER, LYOPHILIZED, FOR SOLUTION INTRAVENOUS at 09:13

## 2022-01-01 RX ADMIN — LORAZEPAM 0.5 MG: 2 INJECTION INTRAMUSCULAR; INTRAVENOUS at 15:46

## 2022-01-01 RX ADMIN — SODIUM CHLORIDE, PRESERVATIVE FREE 10 ML: 5 INJECTION INTRAVENOUS at 10:04

## 2022-01-01 RX ADMIN — APIXABAN 2.5 MG: 2.5 TABLET, FILM COATED ORAL at 09:50

## 2022-01-01 RX ADMIN — APIXABAN 2.5 MG: 2.5 TABLET, FILM COATED ORAL at 09:45

## 2022-01-01 RX ADMIN — METOPROLOL TARTRATE 100 MG: 50 TABLET, FILM COATED ORAL at 09:45

## 2022-01-01 RX ADMIN — DILTIAZEM HYDROCHLORIDE 60 MG: 60 TABLET, FILM COATED ORAL at 09:49

## 2022-01-01 RX ADMIN — PIPERACILLIN AND TAZOBACTAM 3375 MG: 3; .375 INJECTION, POWDER, LYOPHILIZED, FOR SOLUTION INTRAVENOUS at 21:18

## 2022-01-01 RX ADMIN — DILTIAZEM HYDROCHLORIDE 10 MG: 5 INJECTION INTRAVENOUS at 10:01

## 2022-01-01 RX ADMIN — PIPERACILLIN AND TAZOBACTAM 3375 MG: 3; .375 INJECTION, POWDER, LYOPHILIZED, FOR SOLUTION INTRAVENOUS at 10:07

## 2022-01-01 RX ADMIN — DILTIAZEM HYDROCHLORIDE 60 MG: 60 TABLET, FILM COATED ORAL at 21:15

## 2022-01-01 RX ADMIN — DILTIAZEM HYDROCHLORIDE 60 MG: 60 TABLET, FILM COATED ORAL at 20:05

## 2022-01-01 RX ADMIN — POTASSIUM CHLORIDE, DEXTROSE MONOHYDRATE AND SODIUM CHLORIDE: 150; 5; 450 INJECTION, SOLUTION INTRAVENOUS at 00:18

## 2022-01-01 RX ADMIN — SODIUM CHLORIDE, POTASSIUM CHLORIDE, SODIUM LACTATE AND CALCIUM CHLORIDE 1632 ML: 600; 310; 30; 20 INJECTION, SOLUTION INTRAVENOUS at 13:21

## 2022-01-01 RX ADMIN — POTASSIUM CHLORIDE, DEXTROSE MONOHYDRATE AND SODIUM CHLORIDE: 150; 5; 450 INJECTION, SOLUTION INTRAVENOUS at 23:39

## 2022-01-01 RX ADMIN — VANCOMYCIN HYDROCHLORIDE 500 MG: 500 INJECTION, POWDER, LYOPHILIZED, FOR SOLUTION INTRAVENOUS at 14:19

## 2022-01-01 RX ADMIN — ENOXAPARIN SODIUM 50 MG: 60 INJECTION SUBCUTANEOUS at 21:25

## 2022-01-01 RX ADMIN — POTASSIUM CHLORIDE, DEXTROSE MONOHYDRATE AND SODIUM CHLORIDE: 150; 5; 450 INJECTION, SOLUTION INTRAVENOUS at 08:21

## 2022-01-01 RX ADMIN — DILTIAZEM HYDROCHLORIDE 10 MG/HR: 5 INJECTION INTRAVENOUS at 13:05

## 2022-01-01 RX ADMIN — POTASSIUM CHLORIDE, DEXTROSE MONOHYDRATE AND SODIUM CHLORIDE: 150; 5; 450 INJECTION, SOLUTION INTRAVENOUS at 17:49

## 2022-01-01 RX ADMIN — SODIUM CHLORIDE 1000 ML: 9 INJECTION, SOLUTION INTRAVENOUS at 10:00

## 2022-01-01 RX ADMIN — DILTIAZEM HYDROCHLORIDE 60 MG: 60 TABLET, FILM COATED ORAL at 15:39

## 2022-01-01 RX ADMIN — APIXABAN 2.5 MG: 2.5 TABLET, FILM COATED ORAL at 20:05

## 2022-01-01 RX ADMIN — POTASSIUM CHLORIDE, DEXTROSE MONOHYDRATE AND SODIUM CHLORIDE: 150; 5; 450 INJECTION, SOLUTION INTRAVENOUS at 06:35

## 2022-01-01 RX ADMIN — METOPROLOL TARTRATE 50 MG: 50 TABLET, FILM COATED ORAL at 09:49

## 2022-01-01 RX ADMIN — METOPROLOL TARTRATE 5 MG: 5 INJECTION INTRAVENOUS at 00:26

## 2022-01-01 RX ADMIN — DILTIAZEM HYDROCHLORIDE 60 MG: 60 TABLET, FILM COATED ORAL at 02:50

## 2022-01-01 RX ADMIN — DILTIAZEM HYDROCHLORIDE 60 MG: 60 TABLET, FILM COATED ORAL at 09:45

## 2022-01-01 RX ADMIN — DILTIAZEM HYDROCHLORIDE 5 MG/HR: 5 INJECTION INTRAVENOUS at 05:26

## 2022-01-01 RX ADMIN — DIVALPROEX SODIUM 125 MG: 125 CAPSULE ORAL at 05:36

## 2022-01-01 RX ADMIN — PIPERACILLIN AND TAZOBACTAM 3375 MG: 3; .375 INJECTION, POWDER, LYOPHILIZED, FOR SOLUTION INTRAVENOUS at 21:22

## 2022-01-01 RX ADMIN — POTASSIUM CHLORIDE, DEXTROSE MONOHYDRATE AND SODIUM CHLORIDE: 150; 5; 450 INJECTION, SOLUTION INTRAVENOUS at 16:28

## 2022-01-01 RX ADMIN — METOPROLOL TARTRATE 50 MG: 50 TABLET, FILM COATED ORAL at 21:15

## 2022-01-01 RX ADMIN — VANCOMYCIN HYDROCHLORIDE 1000 MG: 1 INJECTION, POWDER, LYOPHILIZED, FOR SOLUTION INTRAVENOUS at 17:45

## 2022-01-01 RX ADMIN — Medication 10 ML: at 10:04

## 2022-01-01 RX ADMIN — PIPERACILLIN AND TAZOBACTAM 3375 MG: 3; .375 INJECTION, POWDER, LYOPHILIZED, FOR SOLUTION INTRAVENOUS at 09:51

## 2022-01-01 RX ADMIN — DILTIAZEM HYDROCHLORIDE 60 MG: 60 TABLET, FILM COATED ORAL at 02:21

## 2022-01-01 RX ADMIN — Medication 10 ML: at 21:15

## 2022-01-01 RX ADMIN — ACETAMINOPHEN 975 MG: 325 SUPPOSITORY RECTAL at 09:09

## 2022-01-01 RX ADMIN — DIVALPROEX SODIUM 125 MG: 125 CAPSULE ORAL at 20:05

## 2022-01-01 RX ADMIN — METOPROLOL TARTRATE 5 MG: 5 INJECTION INTRAVENOUS at 17:16

## 2022-01-01 RX ADMIN — INSULIN GLARGINE 10 UNITS: 100 INJECTION, SOLUTION SUBCUTANEOUS at 20:05

## 2022-01-01 RX ADMIN — METOPROLOL TARTRATE 100 MG: 50 TABLET, FILM COATED ORAL at 20:05

## 2022-01-01 RX ADMIN — SODIUM CHLORIDE 1000 ML: 9 INJECTION, SOLUTION INTRAVENOUS at 08:47

## 2022-01-01 ASSESSMENT — ENCOUNTER SYMPTOMS: TROUBLE SWALLOWING: 1

## 2022-01-31 PROBLEM — R50.9 FEVER AND CHILLS: Status: ACTIVE | Noted: 2022-01-01

## 2022-01-31 NOTE — FLOWSHEET NOTE
Patient arrived to room  at bedside. Patient does not answer or respond to voice. Opens eyes slightly to touch. Patient's cardizem gtt started and titrated to 15 mg/hr. IV LR started per IV bolus order. Patient's b/p stable .

## 2022-01-31 NOTE — FLOWSHEET NOTE
NG tube placed to R nare under fluoroscopy guidance. Final image and placement verified by xray. Gastric contents aspirated and tube flushed with ease. Pt tolerated well. See LDA. Report given to Devon Dodson RN.  Electronically signed by Howie Hernandez RN on 1/31/2022 at 5:32 PM

## 2022-01-31 NOTE — H&P
Hospital Medicine  History and Physical    Patient:  Emerita Mata  MRN: [id number]    CHIEF COMPLAINT:    Chief Complaint   Patient presents with    Fever       History Obtained From:  Patient, EMR  Primary Care Physician: Kim James MD    HISTORY OF PRESENT ILLNESS:   The patient is a 80 y.o. female with PMHx of dementia; A fib, anxiety, depression, HTN, parkinsons who presents with encephalopathy and sepsis. History taken with the help of her . The patient has advanced dementia but still remembers his name and phone number. He has not been able to see her much for the last 2 years due to covid precautions but has recently been allowed ot visit again. Usually he states the food they bring her stays in her room because no one helps feed her; he does feed her lunch and she usually does ok. He noticed a big change in her mental status Friday where she became non verbal and stopped responding to him even though her eyes are open and she is awake. He stated that he found food in her mouth from Friday still there today. Per the  she does drink plenty of water usually. Since Friday he also noted her having a cough and today was found to have a high grade fever. Past Medical History:      Diagnosis Date    Anxiety     Atrial fibrillation (HCC)     Atrial fibrillation (HCC)     Atrial fibrillation (HCC)     Chronic back pain     Dementia (HCC)     Depression     Encephalopathy     Essential tremor     Hypertension     Parkinsons disease ([facility] [address]     Pseudobulbar affect     Trigeminal neuralgia      Past Surgical History:      Procedure Laterality Date    COLONOSCOPY  11/18/09    DR Kaleb Crews    HYSTERECTOMY      SMALL INTESTINE SURGERY      PARTIAL    UPPER GASTROINTESTINAL ENDOSCOPY  5/20/09    DR DONALD     Medications Prior to Admission:    Prior to Admission medications    Medication Sig Start Date End Date Taking?  Authorizing Provider   LACTOBACILLUS PO Take by mouth every morning Give 1 capsule by mouth in the morning for GI health for 10 days   Yes Historical Provider, MD   OLANZapine (ZYPREXA) 2.5 MG tablet Take 2.5 mg by mouth 2 times daily   Yes Historical Provider, MD   apixaban (ELIQUIS) 2.5 MG TABS tablet Take 2.5 mg by mouth 2 times daily   Yes Historical Provider, MD   cefTRIAXone sodium 2 g SOLR Inject 2 g into the muscle every morning 5 days for pneumonia   Yes Historical Provider, MD   vitamin D (CHOLECALCIFEROL) 42878 UNIT CAPS Take 2,000 Units by mouth daily   Yes Historical Provider, MD   acetaminophen (TYLENOL) 500 MG tablet Take 650 mg by mouth    Yes Historical Provider, MD   divalproex (DEPAKOTE SPRINKLE) 125 MG capsule Take 250 mg by mouth 3 times daily  10/28/19  Yes Historical Provider, MD   diltiazem (CARDIZEM CD) 120 MG extended release capsule Take 1 capsule by mouth daily 12/24/18  Yes Court Yin PA-C   metoprolol succinate (TOPROL XL) 100 MG extended release tablet Take 1 tablet by mouth 2 times daily 12/24/18  Yes Court Yin PA-C   digoxin (LANOXIN) 250 MCG tablet Take 125 mcg by mouth daily To = 0.25mg    Yes Historical Provider, MD   furosemide (LASIX) 20 MG tablet Take 20 mg by mouth See Admin Instructions 3 times per week Mon, Wed, Fri   Yes Historical Provider, MD   memantine (NAMENDA) 5 MG tablet Take 1 tablet by mouth 2 times daily 4/8/18  Yes Romayne Eve, MD   famotidine (PEPCID) 20 MG tablet TAKE 1 TABLET BY MOUTH TWICE DAILY 6/11/15  Yes hSante Camacho DO   clonazePAM (KLONOPIN) 0.5 MG tablet Take 0.5 mg by mouth 2 times daily as needed.     Historical Provider, MD   metFORMIN (GLUCOPHAGE) 500 MG tablet Take 1,000 mg by mouth 10/28/19   Historical Provider, MD   risperiDONE (RISPERDAL) 1 MG tablet TAKE 1 TABLET BY MOUTH AT BEDTIME 10/29/19   [place] MD   QUEtiapine (SEROQUEL) 25 MG tablet Take 0.5 tablets by mouth 2 times daily 12/24/18   Nkiolas Ordonez MD   fluticasone (FLONASE) 50 MCG/ACT nasal spray 2 sprays by Each Nare route 2 times daily as needed     Historical Provider, MD   DULoxetine (CYMBALTA) 30 MG extended release capsule Take 30 mg by mouth nightly     Historical Provider, MD     Allergies:  Donepezil and Codeine    Social History:   TOBACCO:   reports that she has never smoked. She has never used smokeless tobacco.  ETOH:   reports no history of alcohol use. Family History:   History reviewed. No pertinent family history. REVIEW OF SYSTEMS:  Ten systems reviewed and negative except for stated in HPI    Physical Exam:    Vitals: /64   Pulse 130   Temp 97.3 °F (36.3 °C) (Axillary)   Resp 28   Wt 120 lb (54.4 kg)   LMP 01/01/1984   SpO2 97%   BMI 21.26 kg/m²   General appearance: NAD but not responding verbally  Skin: No rashes or lesions on exposed skin  HEENT: Head: Normal, normocephalic, atraumatic. Donzell Pyo    Neck: supple, symmetrical, trachea midline  Lungs: clear to auscultation bilaterally  Heart: Irregularly irregular  Abdomen: soft, non-tender; bowel sounds normal; no masses,  no organomegaly  Extremities: extremities normal, atraumatic, no cyanosis or edema  Neurologic: Awake with eyes open but not responding to verbal or tactile stimuli    Recent Labs     01/31/22  0845 01/31/22  0918   WBC 4.8  --    HGB 16.0 15.7   *  --      Recent Labs     01/31/22  0845 01/31/22  0918   *  --    K 4.2  --    *  --    CO2 22  --    BUN 74*  --    CREATININE 1.76* 1.9*   GLUCOSE 247*  --    AST 33  --    ALT 12  --    BILITOT 2.2*  --    ALKPHOS 51  --      Troponin T:   Recent Labs     01/31/22  0845   TROPONINI 0.075*       ABGs:   Lab Results   Component Value Date    PHART 7.395 01/31/2022    PO2ART 291 01/31/2022    EAR4SDI 41 01/31/2022     INR:   Recent Labs     01/31/22  0900   INR 1.3     URINALYSIS:  Recent Labs     01/31/22  0845   COLORU DARK YELLOW*   PHUR 5.0   WBCUA 6-9*   RBCUA 3-5*   BACTERIA FEW*   CLARITYU CLOUDY*   SPECGRAV 1.028   LEUKOCYTESUR SMALL*   UROBILINOGEN 2.0* BILIRUBINUR SMALL*   BLOODU MODERATE*   GLUCOSEU Negative     -----------------------------------------------------------------   XR CHEST PORTABLE    Result Date: 1/31/2022  EXAMINATION:  CHEST RADIOGRAPH (PORTABLE SINGLE VIEW AP) Exam Date/Time:  1/31/2022 8:49 AM Clinical History:   sob Comparison:  2/14/2021  RESULT: Lines, tubes, and devices:  None. Lungs and pleura:  Consolidative opacity in the right middle and lower lobes, concerning for pneumonia. Bibasilar atelectasis. Trace right pleural effusion. No left pleural effusion. No pneumothorax. Cardiomediastinal silhouette:  Stable cardiomediastinal silhouette. Atherosclerotic calcification of the aortic arch. Other: No acute osseous process. Right middle and lower lobe pneumonia. Assessment and Plan   1. Sepsis secondary to right sided pneumonia as well as UTI:  Follow urine cultures; possibly aspiration pneumonia; cover with vanc/zosyn for now and consult pulmonology for their opinion  2. Dysphagia:  Patient currently is not in a state where she can swallow; NG ordered  3. A Fib with RVR:  Cardizem infusion ordered; will need transferred to ; cardiology consult; Discussed with pharmacy and will dose 1mg/kg daily of lovenox until we are able to get NG  4. Dementia with worsening encephalopathy:  Probable metabolic encephalopathy; monitor; per  she would not want a PEG if necessary; encephalopathy present since Friday  5. JOSE:  Bolus per sepsis protocol then switch to D5 1/2NS  6. DMII:  Q6H SSI and adjust as needed  7. Functional Status: Fall precautions. Up with assistance. PT OT  8. Diet: NPO  9. DVT ppx: Lovenox SCDs  10. Disposition: Dependent on hospital course. Will discharge once medically stable. SW on board for discharge planning. Patient Active Problem List   Diagnosis Code    Anxiety F41.9    Depression F32. 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    Acute metabolic encephalopathy P59.84    Dementia without behavioral disturbance (HCC) F03.90    Neurodegenerative dementia with behavioral disturbance (HCC) F03.91    Delusional disorder (HCC) F22    Atrial fibrillation, chronic (HCC) I48.20    Chronic bilateral low back pain without sciatica M54.50, G89.29    Depression, major, recurrent, mild (HCC) F33.0    Encephalopathy G93.40    Parkinsons disease (HCC) G20    Trigeminal neuralgia G50.0    Pseudobulbar affect F48.2    Essential tremor G25.0    Contusion of hip region S70.00XA    Fever and chills R50.9       Ofilia Hashimoto, MD, MD  Admitting Hospitalist    Emergency Contact:

## 2022-01-31 NOTE — PROGRESS NOTES
Spiritual Care Services     Summary of Visit:   visited with patient and  in ED. Patient did not respond to .  talked with patient's  and provided support. Patient is a nursing home patient and the  has not been able to visit with her until recently. They have no children or close family and the rest of the patient's family lie in [facility].  said that he is [employer] and his wife is Scientologist but neither have maintained a membership in a [employer]. Spiritual Assessment/Intervention/Outcomes:    Encounter Summary  Services provided to[de-identified] Patient and family together  Referral/Consult From[de-identified] Multi-disciplinary team  Support System: Spouse  Continue Visiting: Yes  Complexity of Encounter: High  Length of Encounter: 30 minutes  Routine  Type: Initial  Crisis  Type: ED Alert  Assessment: Approachable,Anxious,Anticipatory grief  Intervention: Active listening,Explored feelings, thoughts, concerns,Explored coping resources,Sustaining presence/ Ministry of presence  Outcome: Comfort,Engaged in conversation,Less anxious, less agitated                            Care Plan:     to provide continued support as requested/needed    Spiritual Care Services   Electronically signed by Giselle Hercules on 1/31/22 at 1:24 PM EST     To reach a  for emotional and spiritual support, place an [facility] consult request.   If a  is needed immediately, dial 0 and ask to page the on-call .

## 2022-01-31 NOTE — PROGRESS NOTES
Cardiology Consult Note  Patient: Ramos Pater  Unit/Bed: W010/S101-07  YOB: 1934  MRN: [id number]  Acct: [de-identified]   Admitting Diagnosis: Dehydration [E86.0]  Fever and chills [R50.9]  Septicemia ([address] [A41.9]  Altered mental status, unspecified altered mental status type [R41.82]  Pneumonia of right lower lobe due to infectious organism [J18.9]  Date:  1/31/2022  Hospital Day: 0      Chief Complaint:  AMS    Reason of this consult  Management of A. fib with RVR    History of Present Illness:  59-year-old female who comes to the ER sent from nursing home has history of persistent atrial fibrillation on Eliquis for cardioembolic prophylaxis, severe dementia, Parkinson's, anxiety, depression, hypertension, who was admitted to the hospital today for altered mental status found to have sepsis from UTI as well as pneumonia  Cardiology consulted for evaluation and management of atrial fibrillation    Per review of notes given that at this time patient is only oriented to self, over the weekend patient was nonverbal at SNF, she was found febrile and as a consequence patient was sent to the ER today  Review of labs patient is hypernatremic, JOSE with creatinine of 1.76 (normal creatinine 0.78)  Troponin 0 0.075  Lactic acid elevated at 5.0  Digoxin level 0.8 which is therapeutic  UA significantly positive  Chest x-ray right middle and lower lobe pneumonia  Patient was found in A. fib with RVR  EKG showing A. fib ventricular rate 182 bpm without signs of ischemia    Echocardiogram 9/24/2012 EF 60%    Allergies   Allergen Reactions    Donepezil Other (See Comments)    Codeine      Other reaction(s): GI Upset       Current Facility-Administered Medications   Medication Dose Route Frequency Provider Last Rate Last Admin    dilTIAZem 125 mg in sodium chloride 0.9 % 125 mL infusion  5-15 mg/hr IntraVENous Continuous Nahum Correa MD        glucose (GLUTOSE) 40 % oral gel 15 g  15 g Oral PRN Nahum Correa MD        dextrose 50 % IV solution  12.5 g IntraVENous PRN Karen Ochoa MD        glucagon (rDNA) injection 1 mg  1 mg IntraMUSCular PRN Karen Ochoa MD        dextrose 5 % solution  100 mL/hr IntraVENous PRN Karen Ochoa MD        sodium chloride flush 0.9 % injection 5-40 mL  5-40 mL IntraVENous 2 times per day Karen Ochoa MD        sodium chloride flush 0.9 % injection 5-40 mL  5-40 mL IntraVENous PRN Karen Ochoa MD        0.9 % sodium chloride infusion  25 mL IntraVENous PRN Karen Ochoa MD        ondansetron (ZOFRAN-ODT) disintegrating tablet 4 mg  4 mg Oral Q8H PRN Karen Ochoa MD        Or    ondansetron (ZOFRAN) injection 4 mg  4 mg IntraVENous Q6H PRN Karen Ochoa MD        polyethylene glycol (GLYCOLAX) packet 17 g  17 g Oral Daily PRN Karen Ochoa MD        acetaminophen (TYLENOL) tablet 650 mg  650 mg Oral Q6H PRN Karen Ochoa MD        Or    acetaminophen (TYLENOL) suppository 650 mg  650 mg Rectal Q6H PRN Karen Ochoa MD        sodium chloride flush 0.9 % injection 5-40 mL  5-40 mL IntraVENous 2 times per day Karen Ochoa MD        sodium chloride flush 0.9 % injection 5-40 mL  5-40 mL IntraVENous PRN Karen Ochoa MD        0.9 % sodium chloride infusion  25 mL IntraVENous PRN Karen Ochoa MD        lactated ringers bolus  30 mL/kg IntraVENous Once Karen Ochoa MD        dextrose 5 % and 0.45 % NaCl with KCl 20 mEq infusion   IntraVENous Continuous Karen Ochoa MD        magnesium sulfate 2000 mg in 50 mL IVPB premix  2,000 mg IntraVENous PRN Karen Ochoa MD        potassium chloride 10 mEq/100 mL IVPB (Peripheral Line)  10 mEq IntraVENous PRN Karen Ochoa MD        insulin lispro (HUMALOG) injection vial 0-12 Units  0-12 Units SubCUTAneous Q6H Karen Ochoa MD        piperacillin-tazobactam (ZOSYN) 3,375 mg in dextrose 5 % 50 mL IVPB extended infusion (mini-bag)  3,375 mg IntraVENous Q12H Karen Ochoa MD        enoxaparin (LOVENOX) injection 50 mg  1 mg/kg SubCUTAneous Daily Gabriela Figueroa MD        vancomycin (VANCOCIN) intermittent dosing (placeholder)   Other RX Placeholder Gabriela Figueroa MD        vancomycin 1000 mg IVPB in 250 mL D5W addavial  1,000 mg IntraVENous Once Gabriela Figueroa MD       Smith Pennelope Liner ON 2/1/2022] vancomycin (VANCOCIN) 500 mg in dextrose 5 % 100 mL IVPB (mini-bag)  500 mg IntraVENous Q24H Gabriela Figueroa MD           PMHx:  Past Medical History:   Diagnosis Date    Anxiety     Atrial fibrillation ([address]     Atrial fibrillation (HCC)     Atrial fibrillation (HCC)     Chronic back pain     Dementia ([facility] [address]     Depression     Encephalopathy     Essential tremor     Hypertension     Parkinsons disease ([facility] [address]     Pseudobulbar affect     Trigeminal neuralgia        PSHx:  Past Surgical History:   Procedure Laterality Date    COLONOSCOPY  11/18/09    DR Dennis Shelley    HYSTERECTOMY      SMALL INTESTINE SURGERY      PARTIAL    UPPER GASTROINTESTINAL ENDOSCOPY  5/20/09    DR DONALD       Social Hx:  Social History     Socioeconomic History    Marital status:      Spouse name: None    Number of children: None    Years of education: None    Highest education level: None   Occupational History    None   Tobacco Use    Smoking status: Never Smoker    Smokeless tobacco: Never Used   Vaping Use    Vaping Use: Never used   Substance and Sexual Activity    Alcohol use: No    Drug use: No    Sexual activity: Not Currently   Other Topics Concern    None   Social History Narrative    None     Social Determinants of Health     Financial Resource Strain:     Difficulty of Paying Living Expenses: Not on file   Food Insecurity:     Worried About Running Out of Food in the Last Year: Not on file    Alissa of Food in the Last Year: Not on file   Transportation Needs:     Lack of Transportation (Medical): Not on file    Lack of Transportation (Non-Medical):  Not on file   Physical Activity:     Days of Exercise per Week: Not on file    Minutes of Exercise per Session: Not on file   Stress:     Feeling of Stress : Not on file   Social Connections:     Frequency of Communication with Friends and Family: Not on file    Frequency of Social Gatherings with Friends and Family: Not on file    Attends Druze Services: Not on file    Active Member of Clubs or Organizations: Not on file    Attends Club or Organization Meetings: Not on file    Marital Status: Not on file   Intimate Partner Violence:     Fear of Current or Ex-Partner: Not on file    Emotionally Abused: Not on file    Physically Abused: Not on file    Sexually Abused: Not on file   Housing Stability:     Unable to Pay for Housing in the Last Year: Not on file    Number of [place] in the Last Year: Not on file    Unstable Housing in the Last Year: Not on file       Family Hx:  History reviewed. No pertinent family history. Review of Systems:   Review of Systems  Unable to obtain secondary to clinical condition    Physical Examination:    /64   Pulse 130   Temp 97.3 °F (36.3 °C) (Axillary)   Resp 28   Wt 120 lb (54.4 kg)   LMP 01/01/1984   SpO2 97%   BMI 21.26 kg/m²    Physical Exam  Vitals and nursing note reviewed. Constitutional:       Appearance: Normal appearance. HENT:      Head: Normocephalic and atraumatic. Mouth/Throat:      Mouth: Mucous membranes are moist.      Pharynx: Oropharynx is clear. Eyes:      Extraocular Movements: Extraocular movements intact. Conjunctiva/sclera: Conjunctivae normal.      Pupils: Pupils are equal, round, and reactive to light. Cardiovascular:      Rate and Rhythm: Tachycardia present. Rhythm irregular. Pulses: Normal pulses. Heart sounds: Normal heart sounds. Pulmonary:      Effort: Pulmonary effort is normal.      Breath sounds: Rales present. Abdominal:      General: Abdomen is flat. Bowel sounds are normal.      Palpations: Abdomen is soft.    Musculoskeletal: General: Normal range of motion. Cervical back: Normal range of motion and neck supple. Skin:     General: Skin is warm. Neurological:      General: No focal deficit present. Mental Status: She is alert and oriented to person, place, and time. Mental status is at baseline.    Psychiatric:         Mood and Affect: Mood normal.         LABS:  CBC:  Lab Results   Component Value Date    WBC 4.8 01/31/2022    RBC 5.23 01/31/2022    RBC 4.55 03/19/2012    HGB 15.7 01/31/2022    HCT 48.3 01/31/2022    MCV 92.4 01/31/2022    MCH 30.6 01/31/2022    MCHC 33.2 01/31/2022    RDW 14.4 01/31/2022     01/31/2022    MPV 10.1 06/13/2015     CBC with Differential:   Lab Results   Component Value Date    WBC 4.8 01/31/2022    RBC 5.23 01/31/2022    RBC 4.55 03/19/2012    HGB 15.7 01/31/2022    HCT 48.3 01/31/2022     01/31/2022    MCV 92.4 01/31/2022    MCH 30.6 01/31/2022    MCHC 33.2 01/31/2022    RDW 14.4 01/31/2022    BANDSPCT 4 01/31/2022    LYMPHOPCT 9.0 01/31/2022    MONOPCT 9.0 01/31/2022    EOSPCT 2.0 03/19/2012    BASOPCT 0.0 01/31/2022    MONOSABS 0.4 01/31/2022    LYMPHSABS 0.4 01/31/2022    EOSABS 0.0 01/31/2022    BASOSABS 0.0 01/31/2022     CMP:    Lab Results   Component Value Date     01/31/2022    K 4.2 01/31/2022     01/31/2022    CO2 22 01/31/2022    BUN 74 01/31/2022    CREATININE 1.9 01/31/2022    CREATININE 1.76 01/31/2022    GFRAA 30 01/31/2022    LABGLOM 25 01/31/2022    GLUCOSE 247 01/31/2022    GLUCOSE 97 03/19/2012    PROT 7.8 01/31/2022    LABALBU 3.0 01/31/2022    LABALBU 4.2 03/19/2012    CALCIUM 10.7 01/31/2022    BILITOT 2.2 01/31/2022    ALKPHOS 51 01/31/2022    AST 33 01/31/2022    ALT 12 01/31/2022     BMP:    Lab Results   Component Value Date     01/31/2022    K 4.2 01/31/2022     01/31/2022    CO2 22 01/31/2022    BUN 74 01/31/2022    LABALBU 3.0 01/31/2022    LABALBU 4.2 03/19/2012    CREATININE 1.9 01/31/2022    CREATININE 1.76 01/31/2022    CALCIUM 10.7 01/31/2022    GFRAA 30 01/31/2022    LABGLOM 25 01/31/2022    GLUCOSE 247 01/31/2022    GLUCOSE 97 03/19/2012     Magnesium:    Lab Results   Component Value Date    MG 1.9 03/06/2020     Troponin:    Lab Results   Component Value Date    TROPONINI 0.075 01/31/2022       Radiology:  XR CHEST PORTABLE    Result Date: 1/31/2022  EXAMINATION:  CHEST RADIOGRAPH (PORTABLE SINGLE VIEW AP) Exam Date/Time:  1/31/2022 8:49 AM Clinical History:   sob Comparison:  2/14/2021  RESULT: Lines, tubes, and devices:  None. Lungs and pleura:  Consolidative opacity in the right middle and lower lobes, concerning for pneumonia. Bibasilar atelectasis. Trace right pleural effusion. No left pleural effusion. No pneumothorax. Cardiomediastinal silhouette:  Stable cardiomediastinal silhouette. Atherosclerotic calcification of the aortic arch. Other: No acute osseous process. Right middle and lower lobe pneumonia. EKG: Atrial fibrillation with ventricular rate 184 bpm no signs of ischemia      Assessment:    Active Hospital Problems    Diagnosis Date Noted    Fever and chills [R50.9] 01/31/2022     Persistent atrial fibrillation  Sepsis due to UTI/URI  Severe dementia  Parkinson's disease  Hypertension    Plan:  Continue telemetry  Continue diltiazem drip and wean off as tolerated  Continue management of sepsis as per primary team.  Once sepsis is under control atrial fibrillation will also be better controlled  Continue Eliquis for cardioembolic prophylaxis. Please follow-up H&H daily  Continue home dose metoprolol succinate 100 mg daily  Continue home dose of diltiazem 120 mg extended release  Obtain a TSH level.   Last TSH 0.8921-year ago  Obtain an echocardiogram  Given patient's age and severe dementia I would not recommend ischemic work-up  Please keep potassium between 4 and 5 and magnesium above 2  I will continue to follow              Electronically signed by Dian Vargas MD on 1/31/2022 at 12:21 PM

## 2022-01-31 NOTE — ED NOTES
Bed: 06  Expected date:   Expected time:   Means of arrival:   Comments:  88f [place], RLL pneumonia  Lethargic, AMS, fever, hypoxic  [facility]     Elsa Quintana, EMILIA  01/31/22 [id number]

## 2022-01-31 NOTE — PROGRESS NOTES
[facility]   Facility/Department: [facility]  Speech Language Pathology    Nano Hoguecelia  1934  I013/K685-07    Date: 1/31/2022      Speech Therapy attempted to see Nano Martinez on this date for a/an:    Clinical Bedside Swallow Evaluation    Pt was unable to be seen due to:   Nursing deferred: decreased alertness/responsiveness.  Spoke with RN Shayan Andrade.        Electronically signed by KRISSY Bolton on 1/31/22 at 2:34 PM EST

## 2022-01-31 NOTE — PROGRESS NOTES
Pharmacy Note  Vancomycin Consult    Jimmy Valle is a 80 y.o. female started on Vancomycin for HAP; consult received from Dr. Tomas Ross MD  to manage therapy. Also receiving the following antibiotics: zosyn. Dehydration [E86.0]  Fever and chills [R50.9]  Septicemia ([facility] [address] [A41.9]  Altered mental status, unspecified altered mental status type [R41.82]  Pneumonia of right lower lobe due to infectious organism [J18.9]  Allergies: Donepezil and Codeine    Temp max: 107.3 F    Cultures  Recent Labs     01/31/22  0905   COVID19 Not Detected      , Weight: 120 lb (54.4 kg), Body mass index is 21.26 kg/m². Recent Labs     01/31/22  0918   CREATININE 1.9*   Estimated Creatinine Clearance: 17 mL/min (A) (based on SCr of 1.9 mg/dL (H)). .      Assessment/Plan:  Will initiate Vancomycin with a one time loading dose of 1000 mg x1, followed by 500 mg IV every 24 hours. Data input into [employer] platform. Dosing predicted therapeutic with  trough 16.7. Random level in 24-48 hours to further assess. Timing of future trough levels may be adjusted based on culture results, renal function, and clinical response. Thank you for the consult. Will continue to follow.   Fiona Naranjo, [address] [facility] PharmD

## 2022-01-31 NOTE — ED PROVIDER NOTES
[address] [facility] ED  EMERGENCY DEPARTMENT ENCOUNTER      Pt Name: Maryanne Puente  MRN: [id number]  [place] 1934  Date of evaluation: 1/31/2022  Provider: Iván ,     CHIEF COMPLAINT       Chief Complaint   Patient presents with    Fever         HISTORY OF PRESENT ILLNESS   (Location/Symptom, Timing/Onset, Context/Setting, Quality, Duration, Modifying Factors, Severity)  Note limiting factors. Maryanne Puente is a 80 y.o. female who presents to the emergency department . Patient sent from nursing home with altered mental status and high fever. Diagnosed with pneumonia yesterday. Was to be started on antibiotics today. Patient not responsive. DNR CC. History of dementia, atrial fibrillation, pseudobulbar affect, tremor, depression and hypertension. HPI    Nursing Notes were reviewed. REVIEW OF SYSTEMS    (2-9 systems for level 4, 10 or more for level 5)     Review of Systems   Unable to perform ROS: Patient unresponsive   Constitutional: Positive for fever. Except as noted above the remainder of the review of systems was reviewed and negative. PAST MEDICAL HISTORY     Past Medical History:   Diagnosis Date    Anxiety     Atrial fibrillation ([address]     Atrial fibrillation (HCC)     Atrial fibrillation (HCC)     Chronic back pain     Dementia (HCC)     Depression     Encephalopathy     Essential tremor     Hypertension     Parkinsons disease ([facility] [address]     Pseudobulbar affect     Trigeminal neuralgia          SURGICAL HISTORY       Past Surgical History:   Procedure Laterality Date    COLONOSCOPY  11/18/09    DR Keven [place]    HYSTERECTOMY      SMALL INTESTINE SURGERY      PARTIAL    UPPER GASTROINTESTINAL ENDOSCOPY  5/20/09    DR DONALD         CURRENT MEDICATIONS       Previous Medications    ACETAMINOPHEN (TYLENOL) 500 MG TABLET    Take 650 mg by mouth     CLONAZEPAM (KLONOPIN) 0.5 MG TABLET    Take 0.5 mg by mouth 2 times daily as needed.     DIGOXIN (LANOXIN) 250 MCG TABLET    Take 250 mcg by mouth daily To = 0.25mg    DILTIAZEM (CARDIZEM CD) 120 MG EXTENDED RELEASE CAPSULE    Take 1 capsule by mouth daily    DIVALPROEX (DEPAKOTE SPRINKLE) 125 MG CAPSULE        DULOXETINE (CYMBALTA) 30 MG EXTENDED RELEASE CAPSULE    Take 30 mg by mouth nightly     FAMOTIDINE (PEPCID) 20 MG TABLET    TAKE 1 TABLET BY MOUTH TWICE DAILY    FLUTICASONE (FLONASE) 50 MCG/ACT NASAL SPRAY    2 sprays by Each Nare route 2 times daily as needed     FUROSEMIDE (LASIX) 20 MG TABLET    Take 20 mg by mouth See Admin Instructions 3 times per week Mon, Wed, Fri    LACTOBACILLUS PO    Take by mouth every morning Give 1 capsule by mouth in the morning for GI health for 10 days    MEMANTINE (NAMENDA) 5 MG TABLET    Take 1 tablet by mouth 2 times daily    METFORMIN (GLUCOPHAGE) 500 MG TABLET    Take 1,000 mg by mouth    METOPROLOL SUCCINATE (TOPROL XL) 100 MG EXTENDED RELEASE TABLET    Take 1 tablet by mouth 2 times daily    OLANZAPINE (ZYPREXA) 2.5 MG TABLET    Take 2.5 mg by mouth 2 times daily    QUETIAPINE (SEROQUEL) 25 MG TABLET    Take 0.5 tablets by mouth 2 times daily    RISPERIDONE (RISPERDAL) 1 MG TABLET    TAKE 1 TABLET BY MOUTH AT BEDTIME       ALLERGIES     Donepezil and Codeine    FAMILY HISTORY     History reviewed. No pertinent family history.        SOCIAL HISTORY       Social History     Socioeconomic History    Marital status:      Spouse name: None    Number of children: None    Years of education: None    Highest education level: None   Occupational History    None   Tobacco Use    Smoking status: Never Smoker    Smokeless tobacco: Never Used   Vaping Use    Vaping Use: Never used   Substance and Sexual Activity    Alcohol use: No    Drug use: No    Sexual activity: Not Currently   Other Topics Concern    None   Social History Narrative    None     Social Determinants of Health     Financial Resource Strain:     Difficulty of Paying Living Expenses: Not on file   Food Insecurity:     Worried About Running Out of Food in the Last Year: Not on file    Alissa of Food in the Last Year: Not on file   Transportation Needs:     Lack of Transportation (Medical): Not on file    Lack of Transportation (Non-Medical): Not on file   Physical Activity:     Days of Exercise per Week: Not on file    Minutes of Exercise per Session: Not on file   Stress:     Feeling of Stress : Not on file   Social Connections:     Frequency of Communication with Friends and Family: Not on file    Frequency of Social Gatherings with Friends and Family: Not on file    Attends Anglican Services: Not on file    Active Member of [address] [place] [address] [employer]" or Organizations: Not on file    Attends Club or Organization Meetings: Not on file    Marital Status: Not on file   Intimate Partner Violence:     Fear of Current or Ex-Partner: Not on file    Emotionally Abused: Not on file    Physically Abused: Not on file    Sexually Abused: Not on file   Housing Stability:     Unable to Pay for Housing in the Last Year: Not on file    Number of [place] in the Last Year: Not on file    Unstable Housing in the Last Year: Not on file       SCREENINGS        [place] Coma Scale  Eye Opening: Spontaneous  Best Verbal Response: None  Best Motor Response: Extension to pain  Jan Coma Scale Score: 7               PHYSICAL EXAM    (up to 7 for level 4, 8 or more for level 5)     ED Triage Vitals [01/31/22 0830]   BP Temp Temp Source Pulse Resp SpO2 Height Weight   (!) 141/71 (!) 107.3 °F (41.8 °C) Rectal 100 (!) 40 94 % -- --       Physical Exam  Vitals and nursing note reviewed. Constitutional:       Appearance: She is ill-appearing. Comments: Unresponsive. HENT:      Head: Normocephalic and atraumatic. Nose: Nose normal.      Mouth/Throat:      Mouth: Mucous membranes are dry. Eyes:      Conjunctiva/sclera: Conjunctivae normal.      Pupils: Pupils are equal, round, and reactive to light. Cardiovascular:      Rate and Rhythm: Tachycardia present. Rhythm irregular. Pulmonary:      Effort: Respiratory distress present. Abdominal:      General: Bowel sounds are normal.      Palpations: Abdomen is soft. Musculoskeletal:         General: No swelling. Cervical back: Neck supple. Skin:     General: Skin is warm and dry. Neurological:      Comments: Unresponsive         DIAGNOSTIC RESULTS     EKG: All EKG's are interpreted by the Emergency Department Physician who either signs or Co-signs this chart in the absence of a cardiologist.    Atrial fibrillation with  bpm ST depression laterally.     RADIOLOGY:   Non-plain film images such as CT, Ultrasound and MRI are read by the radiologist. Plain radiographic images are visualized and preliminarily interpreted by the emergency physician with the below findings:    Chest x-ray shows right lower lobe consolidation    Interpretation per the Radiologist below, if available at the time of this note:    XR CHEST PORTABLE    (Results Pending)         ED BEDSIDE ULTRASOUND:   Performed by ED Physician - none    LABS:  Labs Reviewed   LACTIC ACID, PLASMA - Abnormal; Notable for the following components:       Result Value    Lactic Acid 5.0 (*)     All other components within normal limits    Narrative:     Malachi Rucker  [facility] tel. [phone],  LACTIC ACID results called to and read back by [address],  01/31/2022 09:25, by Leonides Nelson   CBC WITH AUTO DIFFERENTIAL - Abnormal; Notable for the following components:    Hematocrit 48.3 (*)     Platelets 042 (*)     All other components within normal limits   URINE RT REFLEX TO CULTURE - Abnormal; Notable for the following components:    Color, UA DARK YELLOW (*)     Clarity, UA CLOUDY (*)     Bilirubin Urine SMALL (*)     Ketones, Urine TRACE (*)     Blood, Urine MODERATE (*)     Protein, UA >=300 (*)     Urobilinogen, Urine 2.0 (*)     Nitrite, Urine POSITIVE (*)     Leukocyte Esterase, Urine SMALL (*)     All other components within normal limits   MICROSCOPIC URINALYSIS - Abnormal; Notable for the following components:    Bacteria, UA FEW (*)     WBC, UA 6-9 (*)     RBC, UA 3-5 (*)     All other components within normal limits   POCT ARTERIAL - Abnormal; Notable for the following components:    POC Sodium 154 (*)     POC Potassium 3.2 (*)     POC Chloride 118 (*)     POC Glucose 254 (*)     POC Creatinine 1.9 (*)     GFR Non- 25 (*)     GFR  30 (*)     pO2, Arterial 291 (*)     O2 Sat, Arterial 100 (*)     Lactate 2.68 (*)     All other components within normal limits   COVID-19, RAPID   CULTURE, BLOOD 2   CULTURE, BLOOD 1   TROPONIN   COMPREHENSIVE METABOLIC PANEL   PROCALCITONIN   PROTIME-INR   DIGOXIN LEVEL       All other labs were within normal range or not returned as of this dictation. EMERGENCY DEPARTMENT COURSE and DIFFERENTIAL DIAGNOSIS/MDM:   Vitals:    Vitals:    01/31/22 0858 01/31/22 0900 01/31/22 0917 01/31/22 0920   BP: (!) 170/89 (!) 146/83     Pulse: 138 150     Resp: (!) 39 (!) 41  (!) 43   Temp:   103.7 °F (39.8 °C)    TempSrc:   Rectal    SpO2: 98% 97%  97%       Patient here with sepsis pneumonia altered mental status and dehydration. Patient getting IV fluids IV antibiotics. Patient has ice in axillas and groin for temperature of 107. Atrial flutter with RVR. Patient has history of A. fib but was not given any medications today due to her altered mental state. Patient getting some IV Cardizem to control rate. Currently patient DNR CC.  does want patient treated so I believe that she may need to be changed to DNR CCA. Definitely does not want any intubation CPR anything extraordinary. MDM      REASSESSMENT          CRITICAL CARE TIME   Total Critical Care time was 30 minutes, excluding separately reportable procedures.   There was a high probability of clinically significant/life threatening deterioration in the patient's condition which required my urgent intervention. CONSULTS:  None    PROCEDURES:  Unless otherwise noted below, none     Procedures        FINAL IMPRESSION      1. Pneumonia of right lower lobe due to infectious organism    2. Septicemia ([facility] [address]    3. Altered mental status, unspecified altered mental status type    4. Dehydration          DISPOSITION/PLAN   DISPOSITION  admit      PATIENT REFERRED TO:  No follow-up provider specified. DISCHARGE MEDICATIONS:  New Prescriptions    No medications on file     Controlled Substances Monitoring:     RX Monitoring 7/24/2015   Attestation The Prescription Monitoring Report for this patient was reviewed today. Periodic Controlled Substance Monitoring Possible medication side effects, risk of tolerance and/or dependence, and alternative treatments discussed; No signs of potential drug abuse or diversion identified.        (Please note that portions of this note were completed with a voice recognition program.  Efforts were made to edit the dictations but occasionally words are mis-transcribed.)    Maylin Long DO (electronically signed)  Attending Emergency Physician            Maylin Long DO  01/31/22 1042

## 2022-01-31 NOTE — CARE COORDINATION
Unable to reach the spouse by phone to let him know to come to ER to see his wife. I called [employer] and they said that they called the  and he is on his way here to [facility].

## 2022-01-31 NOTE — ED NOTES
Pt in bed,  at bedside. Updated on plan of care, RT at bedside to draw ABG. Call light within reach.      \[facility] [place] [facility]\""  01/31/22 1065

## 2022-01-31 NOTE — CARE COORDINATION
0900 - Pt's spouse, Calli Espinosa, had arrived. Brought him back to pt's room and asked him if he still wants her to be [facility]. I explained to him what Erica Solis [address] and he agrees that he wants to keep her [facility].

## 2022-01-31 NOTE — FLOWSHEET NOTE
14F NGT placed into patient's right nostril. Air bolus and auscultation done. Order obtained for [facility] for placement confirmation. Patient will be taken to xray on the way to MRI. Dr. Ailyn Solis notified that patient's mouth is \"chattering\" and patient is unresponsive to verbal commands. Ativan 0.5mg IV x1 ordered prior to patient leaving for MRI.

## 2022-01-31 NOTE — PROGRESS NOTES
Physical Therapy Missed Treatment   Facility/Department: [facility] MED SURG O198/P655-74    NAME: Emerita Mata    : 1934 (80 y.o.)  MRN: [id number]    Account: [de-identified]  Gender: female      PT referral received. Chart reviewed. Hold per RN- elevated HR and fever. Will follow and attempt PT evaluation again at earliest availability.        Genet Marcos, PT, 22 at 1:16 PM

## 2022-02-01 NOTE — CONSULTS
Desiree Springer  1934  female  Medical Record Number: [id number]    HPI: Patient with advanced dementia with fever and mental status changes. Found to have UTI, bacteremia, and RLL pneumonia.   + hx of parkinsons  Per medical records,  found food in her mouth from days before. Review of Systems: All 14 review of systems were attempted with  patient and are negative other than as stated above      Past Medical History:   Diagnosis Date    Anxiety     Atrial fibrillation (HCC)     Atrial fibrillation (HCC)     Atrial fibrillation (HCC)     Chronic back pain     Dementia ([facility] [address]     Depression     Encephalopathy     Essential tremor     Hypertension     Parkinsons disease ([facility] [address]     Pseudobulbar affect     Trigeminal neuralgia        Past Surgical History:   Procedure Laterality Date    COLONOSCOPY  11/18/09    DR Ra Lopez    HYSTERECTOMY      SMALL INTESTINE SURGERY      PARTIAL    UPPER GASTROINTESTINAL ENDOSCOPY  5/20/09    DR DONALD       Social History     Socioeconomic History    Marital status:      Spouse name: Not on file    Number of children: Not on file    Years of education: Not on file    Highest education level: Not on file   Occupational History    Not on file   Tobacco Use    Smoking status: Never Smoker    Smokeless tobacco: Never Used   Vaping Use    Vaping Use: Never used   Substance and Sexual Activity    Alcohol use: No    Drug use: No    Sexual activity: Not Currently   Other Topics Concern    Not on file   Social History Narrative    Not on file     Social Determinants of Health     Financial Resource Strain:     Difficulty of Paying Living Expenses: Not on file   Food Insecurity:     Worried About Running Out of Food in the Last Year: Not on file    Alissa of Food in the Last Year: Not on file   Transportation Needs:     Lack of Transportation (Medical): Not on file    Lack of Transportation (Non-Medical):  Not on file   Physical Activity:  Days of Exercise per Week: Not on file    Minutes of Exercise per Session: Not on file   Stress:     Feeling of Stress : Not on file   Social Connections:     Frequency of Communication with Friends and Family: Not on file    Frequency of Social Gatherings with Friends and Family: Not on file    Attends Nondenominational Services: Not on file    Active Member of [address] [place] [address] or Organizations: Not on file    Attends Club or Organization Meetings: Not on file    Marital Status: Not on file   Intimate Partner Violence:     Fear of Current or Ex-Partner: Not on file    Emotionally Abused: Not on file    Physically Abused: Not on file    Sexually Abused: Not on file   Housing Stability:     Unable to Pay for Housing in the Last Year: Not on file    Number of [place] in the Last Year: Not on file    Unstable Housing in the Last Year: Not on file       History reviewed. No pertinent family history. No current facility-administered medications on file prior to encounter.      Current Outpatient Medications on File Prior to Encounter   Medication Sig Dispense Refill    LACTOBACILLUS PO Take by mouth every morning Give 1 capsule by mouth in the morning for GI health for 10 days      OLANZapine (ZYPREXA) 2.5 MG tablet Take 2.5 mg by mouth 2 times daily      apixaban (ELIQUIS) 2.5 MG TABS tablet Take 2.5 mg by mouth 2 times daily      cefTRIAXone sodium 2 g SOLR Inject 2 g into the muscle every morning 5 days for pneumonia      vitamin D (CHOLECALCIFEROL) 35018 UNIT CAPS Take 2,000 Units by mouth daily      acetaminophen (TYLENOL) 500 MG tablet Take 650 mg by mouth       divalproex (DEPAKOTE SPRINKLE) 125 MG capsule Take 250 mg by mouth 3 times daily       diltiazem (CARDIZEM CD) 120 MG extended release capsule Take 1 capsule by mouth daily 30 capsule 3    metoprolol succinate (TOPROL XL) 100 MG extended release tablet Take 1 tablet by mouth 2 times daily 30 tablet 3    digoxin (LANOXIN) 250 MCG tablet Take 125 mcg by mouth daily To = 0.25mg       furosemide (LASIX) 20 MG tablet Take 20 mg by mouth See Admin Instructions 3 times per week Mon, Wed, Fri      memantine (NAMENDA) 5 MG tablet Take 1 tablet by mouth 2 times daily 60 tablet 0    famotidine (PEPCID) 20 MG tablet TAKE 1 TABLET BY MOUTH TWICE DAILY 60 tablet 3    clonazePAM (KLONOPIN) 0.5 MG tablet Take 0.5 mg by mouth 2 times daily as needed.  metFORMIN (GLUCOPHAGE) 500 MG tablet Take 1,000 mg by mouth      risperiDONE (RISPERDAL) 1 MG tablet TAKE 1 TABLET BY MOUTH AT BEDTIME 90 tablet 0    QUEtiapine (SEROQUEL) 25 MG tablet Take 0.5 tablets by mouth 2 times daily 30 tablet 1    fluticasone (FLONASE) 50 MCG/ACT nasal spray 2 sprays by Each Nare route 2 times daily as needed       DULoxetine (CYMBALTA) 30 MG extended release capsule Take 30 mg by mouth nightly          Physical Exam:  Patient with chronic debility  General: Patient appears in no acute distress  Skin: no new rashes or erythema or induration  HEENT Neck is supple  Heart: S1 S2  Lungs: bilaterally equal expnasion  Abdomen: soft, ND, NTTP, +BS  Extrem no asymmetry of the upper or lower extremities  Neuro exam: CN II-XII intact  parkinsons hx    Labs: I have reviewed all lab results by electronic record, including most recent CBC, metabolic panel, and pertinent abnormalities were addressed from an infectious disease perspective. WBC trends are being monitored.     Lab Results   Component Value Date     02/01/2022    K 4.2 02/01/2022     02/01/2022    CO2 19 02/01/2022    BUN 52 02/01/2022    CREATININE 1.07 02/01/2022    GLUCOSE 384 02/01/2022    GLUCOSE 97 03/19/2012    CALCIUM 9.4 02/01/2022      Lab Results   Component Value Date    WBC 7.1 02/01/2022    HGB 12.8 02/01/2022    HCT 39.5 02/01/2022    MCV 93.2 02/01/2022    PLT 89 (L) 02/01/2022     Procal > 19    Radiology:   I have reviewed imaging results per electronic record and most pertinent abnormalities are being addressed from an infectious disease standpoint. Assessment:  Bacteremia - GPC chains and pairs 2/2  RML/RLL pneumonia - possibly aspiration  UTI - culture pending  Parkinsons Disease  Acute metabolic encephalopathy  Dementia/ Chronic debility    Plan:  Follow up all culture results  Vanco/Zosyn for now  Direct therapy with culture results  Repeat BC x 2 for clearance.        Karis Valadez D.O.

## 2022-02-01 NOTE — FLOWSHEET NOTE
Patient's tube feeding started as ordered per NGT. NG checked for placement with air bolus. Suction equipment set up in room and functioning. HOB locked at 30 degrees.

## 2022-02-01 NOTE — CONSULTS
Inpatient consult to Pulmonology  Consult performed by: Mark Solis MD  Consult ordered by: Teresa Cross MD            Admit Date: 1/31/2022    PCP:  Arcenio Gama MD    CHIEF COMPLAINT: Fever and mental status changes. HPI:  The patient is a 80 y.o. female with significant past medical history of atrial fibrillation, dementia, Parkinson's disease and hypertension who presented with mental status changes and fever. Most of the HPI is from the charts. Patient is nonverbal.  Apparently she was having worsening mental status changes. Has been noticed fever as well. She became nonverbal to him and looked really ill. Her oral intake has decreased as well. She was brought to the emergency room where she found to be slightly hypotensive and febrile. She had 2 sets of blood culture and a chest x-ray. Chest x-ray was reviewed personally and results interpreted  independently. There is evidence of consolidation in the right mid and lower fields. She was started on broad-spectrum antibiotics. She was placed on oxygen. Today, to have her blood cultures came back positive with gram-positive cocci's in chains.       Past Medical History:      Diagnosis Date    Anxiety     Atrial fibrillation (HCC)     Atrial fibrillation (HCC)     Atrial fibrillation (HCC)     Chronic back pain     Dementia (HCC)     Depression     Encephalopathy     Essential tremor     Hypertension     Parkinsons disease (HCC)     Pseudobulbar affect     Trigeminal neuralgia         Past Surgical History:        Procedure Laterality Date    COLONOSCOPY  11/18/09    DR DONALD    HYSTERECTOMY      SMALL INTESTINE SURGERY      PARTIAL    UPPER GASTROINTESTINAL ENDOSCOPY  5/20/09    DR Ginna Russo       Current Medications:     sodium chloride flush  5-40 mL IntraVENous 2 times per day    sodium chloride flush  5-40 mL IntraVENous 2 times per day    insulin lispro  0-12 Units SubCUTAneous Q6H    piperacillin-tazobactam  3,375 mg IntraVENous Q12H    [Held by provider] enoxaparin  1 mg/kg SubCUTAneous Daily    vancomycin (VANCOCIN) intermittent dosing (placeholder)   Other RX Placeholder    vancomycin  500 mg IntraVENous Q24H    apixaban  2.5 mg Oral BID    metoprolol tartrate  50 mg Oral BID    dilTIAZem  60 mg Oral 4 times per day     Home Meds:  Prior to Admission medications    Medication Sig Start Date End Date Taking?  Authorizing Provider   LACTOBACILLUS PO Take by mouth every morning Give 1 capsule by mouth in the morning for GI health for 10 days   Yes Historical Provider, MD   OLANZapine (ZYPREXA) 2.5 MG tablet Take 2.5 mg by mouth 2 times daily   Yes Historical Provider, MD   apixaban (ELIQUIS) 2.5 MG TABS tablet Take 2.5 mg by mouth 2 times daily   Yes Historical Provider, MD   cefTRIAXone sodium 2 g SOLR Inject 2 g into the muscle every morning 5 days for pneumonia   Yes Historical Provider, MD   vitamin D (CHOLECALCIFEROL) 26211 UNIT CAPS Take 2,000 Units by mouth daily   Yes Historical Provider, MD   acetaminophen (TYLENOL) 500 MG tablet Take 650 mg by mouth    Yes Historical Provider, MD   divalproex (DEPAKOTE SPRINKLE) 125 MG capsule Take 250 mg by mouth 3 times daily  10/28/19  Yes Historical Provider, MD   diltiazem (CARDIZEM CD) 120 MG extended release capsule Take 1 capsule by mouth daily 12/24/18  Yes Makenzie De Dios PA-C   metoprolol succinate (TOPROL XL) 100 MG extended release tablet Take 1 tablet by mouth 2 times daily 12/24/18  Yes Makenzie De Dios PA-C   digoxin (LANOXIN) 250 MCG tablet Take 125 mcg by mouth daily To = 0.25mg    Yes Historical Provider, MD   furosemide (LASIX) 20 MG tablet Take 20 mg by mouth See Admin Instructions 3 times per week Mon, Wed, Fri   Yes Historical Provider, MD   memantine (NAMENDA) 5 MG tablet Take 1 tablet by mouth 2 times daily 4/8/18  Yes Luna Bolanos MD   famotidine (PEPCID) 20 MG tablet TAKE 1 TABLET BY MOUTH TWICE DAILY 6/11/15  Yes Catalino Mendoza, DO   clonazePAM (KLONOPIN) 0.5 MG tablet Take 0.5 mg by mouth 2 times daily as needed. Historical Provider, MD   metFORMIN (GLUCOPHAGE) 500 MG tablet Take 1,000 mg by mouth 10/28/19   Historical Provider, MD   risperiDONE (RISPERDAL) 1 MG tablet TAKE 1 TABLET BY MOUTH AT BEDTIME 10/29/19   Rosa Betancourt MD   QUEtiapine (SEROQUEL) 25 MG tablet Take 0.5 tablets by mouth 2 times daily 18   Cayla Pino MD   fluticasone (FLONASE) 50 MCG/ACT nasal spray 2 sprays by Each Nare route 2 times daily as needed     Historical Provider, MD   DULoxetine (CYMBALTA) 30 MG extended release capsule Take 30 mg by mouth nightly     Historical Provider, MD       Allergies:  Donepezil and Codeine     Social History:      reports that she has never smoked. She has never used smokeless tobacco. She reports that she does not drink alcohol and does not use drugs. TOBACCO:   reports that she has never smoked. She has never used smokeless tobacco.     ETOH:   reports no history of alcohol use. Family History:   Nonpertinent     Review of Systems  Unable to perform review of systems due to patient being nonverbal.      Objective:     PHYSICAL EXAM:      VITALS:  /72   Pulse 84   Temp 97.1 °F (36.2 °C) (Axillary)   Resp 20   Ht 5' 3\" (1.6 m)   Wt 122 lb 0.2 oz (55.3 kg)   LMP 1984   SpO2 100%   BMI 21.61 kg/m²   24HR INTAKE/OUTPUT:      Intake/Output Summary (Last 24 hours) at 2022 0850  Last data filed at 2022 0700  Gross per 24 hour   Intake 4057.83 ml   Output --   Net 4057.83 ml     CURRENT PULSE OXIMETRY:  SpO2: 100 %  24HR PULSE OXIMETRY RANGE:  SpO2  Av.3 %  Min: 94 %  Max: 100 %    General appearance -cachectic and ill appearing   Mental status -   nonverbal.  Eyes - pupils equal and reactive, extraocular eye movements intact. Normal sclera and conjunctiva   Nose - normal and patent, NG tube in place  Neck - supple, no significant adenopathy. No thyromegaly. Chest -diminished bilaterally.   Bilateral crackles but more on the right side. symmetric air entry  Heart - normal rate, regular rhythm, normal S1, S2, no murmurs, rubs, clicks or gallops  Abdomen - soft, nontender, nondistended, no masses or organomegaly. Bowel sounds present. Rectal - deferred, not clinically indicated  Neurological -nonverbal, nonfocal.  Has tremors    Musculoskeletal - no joint tenderness, deformity or swelling  Extremities - peripheral pulses normal, no pedal edema, no clubbing or cyanosis  Skin - normal coloration and turgor, no rashes, no suspicious skin lesions noted  Psych: Unable to assess. She is currently nonverbal.         DATA:    CBC:   Recent Labs     01/31/22  0845 01/31/22  0918 02/01/22  0511   WBC 4.8  --  7.1   HGB 16.0 15.7 12.8   HCT 48.3*  --  39.5   *  --  89*     BMP:    Recent Labs     01/31/22  0845 01/31/22  0918 01/31/22  1211 02/01/22  0511   *  --  152* 149*   K 4.2  --  3.4 4.2   *  --  116* 114*   CO2 22  --  20 19*   BUN 74*  --  66* 52*   CREATININE 1.76* 1.9* 1.43* 1.07*   GLUCOSE 247*  --  246* 384*   CALCIUM 10.7*  --  9.0 9.4   MG  --   --  2.2  --      HEPATIC:   Recent Labs     01/31/22  0845 02/01/22  0511   AST 33 27   ALT 12 12   BILITOT 2.2* 1.2*   ALKPHOS 51 43     LACTATE:   Recent Labs     01/31/22  0845   LACTA 5.0*     PROCALCITONIN:   Recent Labs     01/31/22  0845 02/01/22  0511   PROCAL 4.99* 19.36*     TROPONIN:   Recent Labs     01/31/22 0845   TROPONINI 0.075*      Recent Labs     01/31/22  0900   INR 1.3     BLOOD GAS: No results for input(s): PH, PCO2, PO2, HCO3, O2SAT in the last 72 hours. UA:  Recent Labs     01/31/22 0845   COLORU DARK YELLOW*   NITRU POSITIVE*   LEUKOCYTESUR SMALL*   GLUCOSEU Negative   KETUA TRACE*   RBCUA 3-5*   WBCUA 6-9*   BACTERIA FEW*        Cultures:  No results for input(s): Omayra Sheldon in the last 72 hours.   Recent Labs     01/31/22  0857   BC Gram stain aerobic bottle  Gram positive cocci in chains and pairs- resembling Strep  2 out of 2 blood cultures  Further results to follow  *     Recent Labs     01/31/22  0857   BLOODCULT2 Gram stain aerobic bottle  Gram positive cocci in chains - resembling Strep  2 out of 2 blood cultures  Further results to follow  *          Radiology Review:    CXR portable: Results for orders placed during the hospital encounter of 01/31/22    XR CHEST PORTABLE    Narrative  EXAMINATION:  CHEST RADIOGRAPH (PORTABLE SINGLE VIEW AP)    Exam Date/Time:  1/31/2022 8:49 AM  Clinical History:   sob  Comparison:  2/14/2021      RESULT:    Lines, tubes, and devices:  None. Lungs and pleura:  Consolidative opacity in the right middle and lower lobes, concerning for pneumonia. Bibasilar atelectasis. Trace right pleural effusion. No left pleural effusion. No pneumothorax. Cardiomediastinal silhouette:  Stable cardiomediastinal silhouette. Atherosclerotic calcification of the aortic arch. Other: No acute osseous process. Impression  Right middle and lower lobe pneumonia. Echo:      Impression:     -Acute hypoxic respiratory failure likely due to right-sided pneumonia. -Right middle and lower lobe pneumonia. Suspect aspiration.  -Sepsis due to bacteremia. -GPC bacteremia.  -Acute kidney injury, likely due to hypotension.  -Hyponatremia. Recommendations:    -Continue oxygen. Goal saturation above 90%. -Continue broad-spectrum antibiotic. Await final culture results.  -Keep n.p.o.  NG tube in place. -Gentle hydration.  -Trend kidney function and procalcitonin. -DVT and GI prophylaxis. Thank you for allowing as to participate in the care of this pleasant patient. Further recommendations to follow.        Electronically signed by Mary Barraza MD on 2/1/2022 at 8:50 AM

## 2022-02-01 NOTE — PROGRESS NOTES
Cardiology Consult Note  Patient: Lb Reid  Unit/Bed: R905/L949-65  YOB: 1934  MRN: [id number]  Acct: [de-identified]   Admitting Diagnosis: Dehydration [E86.0]  Fever and chills [R50.9]  Septicemia ([facility] [address] [A41.9]  Altered mental status, unspecified altered mental status type [R41.82]  Pneumonia of right lower lobe due to infectious organism [J18.9]  Date:  1/31/2022  Hospital Day: 1      Chief Complaint:  AMS    Reason of this consult  Management of A. fib with RVR    History of Present Illness:  59-year-old female who comes to the ER sent from nursing home has history of persistent atrial fibrillation on Eliquis for cardioembolic prophylaxis, severe dementia, Parkinson's, anxiety, depression, hypertension, who was admitted to the hospital today for altered mental status found to have sepsis from UTI as well as pneumonia  Cardiology consulted for evaluation and management of atrial fibrillation    Per review of notes given that at this time patient is only oriented to self, over the weekend patient was nonverbal at SNF, she was found febrile and as a consequence patient was sent to the ER today  Review of labs patient is hypernatremic, JOSE with creatinine of 1.76 (normal creatinine 0.78)  Troponin 0 0.075  Lactic acid elevated at 5.0  Digoxin level 0.8 which is therapeutic  UA significantly positive  Chest x-ray right middle and lower lobe pneumonia  Patient was found in A. fib with RVR  EKG showing A. fib ventricular rate 182 bpm without signs of ischemia    Echocardiogram 9/24/2012 EF 60%    2/1/2022  Patient laying in bed comfortably  Not following commands, patient is nonverbal  Telemetry showing atrial fibrillation rate controlled in the 80s    Allergies   Allergen Reactions    Donepezil Other (See Comments)    Codeine      Other reaction(s): GI Upset       Current Facility-Administered Medications   Medication Dose Route Frequency Provider Last Rate Last Admin    insulin glargine (LANTUS) injection vial 10 Units  10 Units SubCUTAneous Nightly Phoebe Izquierdo MD        metoprolol tartrate (LOPRESSOR) tablet 100 mg  100 mg Oral BID Claudine Coto MD        dilTIAZem 125 mg in sodium chloride 0.9 % 125 mL infusion  5-15 mg/hr IntraVENous Continuous Phoebe Izquierdo MD   Stopped at 01/31/22 2214    glucose (GLUTOSE) 40 % oral gel 15 g  15 g Oral PRN Phoebe Izquierdo MD        dextrose 50 % IV solution  12.5 g IntraVENous PRN Phoebe Izquierdo MD        glucagon (rDNA) injection 1 mg  1 mg IntraMUSCular PRN Phoebe Izquierdo MD        dextrose 5 % solution  100 mL/hr IntraVENous PRN Phoebe Izquierdo MD        sodium chloride flush 0.9 % injection 5-40 mL  5-40 mL IntraVENous 2 times per day Phoebe Izquierdo MD   10 mL at 02/01/22 1004    sodium chloride flush 0.9 % injection 5-40 mL  5-40 mL IntraVENous PRN Phoebe Izquierdo MD        0.9 % sodium chloride infusion  25 mL IntraVENous PRN Phoebe Izquierdo MD        ondansetron (ZOFRAN-ODT) disintegrating tablet 4 mg  4 mg Oral Q8H PRN Phoebe Izquierdo MD        Or    ondansetron (ZOFRAN) injection 4 mg  4 mg IntraVENous Q6H PRN Phoebe Izquierdo MD        polyethylene glycol (GLYCOLAX) packet 17 g  17 g Oral Daily PRN Phoebe Izquierdo MD        acetaminophen (TYLENOL) tablet 650 mg  650 mg Oral Q6H PRN Phoebe Izquierdo MD        Or    acetaminophen (TYLENOL) suppository 650 mg  650 mg Rectal Q6H PRN Phoebe Izquierdo MD        sodium chloride flush 0.9 % injection 5-40 mL  5-40 mL IntraVENous 2 times per day Phoebe Izquierdo MD   10 mL at 02/01/22 1004    sodium chloride flush 0.9 % injection 5-40 mL  5-40 mL IntraVENous PRN Phoebe Izquierdo MD        0.9 % sodium chloride infusion  25 mL IntraVENous PRN Phoebe Izquierdo MD        dextrose 5 % and 0.45 % NaCl with KCl 20 mEq infusion   IntraVENous Continuous Phoebe Izquierdo  mL/hr at 02/01/22 0821 New Bag at 02/01/22 0821    magnesium sulfate 2000 mg in 50 mL IVPB premix  2,000 mg IntraVENous PRN Phoebe Izquierdo MD  potassium chloride 10 mEq/100 mL IVPB (Peripheral Line)  10 mEq IntraVENous PRN Cherelle Emery MD        insulin lispro (HUMALOG) injection vial 0-12 Units  0-12 Units SubCUTAneous Q6H Cherelle Emery MD   8 Units at 02/01/22 1207    piperacillin-tazobactam (ZOSYN) 3,375 mg in dextrose 5 % 50 mL IVPB extended infusion (mini-bag)  3,375 mg IntraVENous Q12H Cherelle Emery MD 12.5 mL/hr at 02/01/22 0951 3,375 mg at 02/01/22 6723    vancomycin (VANCOCIN) intermittent dosing (placeholder)   Other RX Placeholder Cherelle Emery MD        vancomycin (VANCOCIN) 500 mg in dextrose 5 % 100 mL IVPB (mini-bag)  500 mg IntraVENous Q24H Cherelle Emery MD        apixaban (ELIQUIS) tablet 2.5 mg  2.5 mg Oral BID Sharlene Macedo MD   2.5 mg at 02/01/22 0950    dilTIAZem (CARDIZEM) tablet 60 mg  60 mg Oral 4 times per day Sharlene Macedo MD   60 mg at 02/01/22 0949       PMHx:  Past Medical History:   Diagnosis Date    Anxiety     Atrial fibrillation (HCC)     Atrial fibrillation (HCC)     Atrial fibrillation (HCC)     Chronic back pain     Dementia (HCC)     Depression     Encephalopathy     Essential tremor     Hypertension     [facility] ([facility] [address]     Pseudobulbar affect     Trigeminal neuralgia        PSHx:  Past Surgical History:   Procedure Laterality Date    COLONOSCOPY  11/18/09    DR Marzette Landau    HYSTERECTOMY      SMALL INTESTINE SURGERY      PARTIAL    UPPER GASTROINTESTINAL ENDOSCOPY  5/20/09    DR DONALD       Social Hx:  Social History     Socioeconomic History    Marital status:      Spouse name: None    Number of children: None    Years of education: None    Highest education level: None   Occupational History    None   Tobacco Use    Smoking status: Never Smoker    Smokeless tobacco: Never Used   Vaping Use    Vaping Use: Never used   Substance and Sexual Activity    Alcohol use: No    Drug use: No    Sexual activity: Not Currently   Other Topics Concern    None   Social History Narrative    None     Social Determinants of Health     Financial Resource Strain:     Difficulty of Paying Living Expenses: Not on file   Food Insecurity:     Worried About Running Out of Food in the Last Year: Not on file    Alissa of Food in the Last Year: Not on file   Transportation Needs:     Lack of Transportation (Medical): Not on file    Lack of Transportation (Non-Medical): Not on file   Physical Activity:     Days of Exercise per Week: Not on file    Minutes of Exercise per Session: Not on file   Stress:     Feeling of Stress : Not on file   Social Connections:     Frequency of Communication with Friends and Family: Not on file    Frequency of Social Gatherings with Friends and Family: Not on file    Attends Rastafarian Services: Not on file    Active Member of [address] [place] [address] [employer] or Organizations: Not on file    Attends Club or Organization Meetings: Not on file    Marital Status: Not on file   Intimate Partner Violence:     Fear of Current or Ex-Partner: Not on file    Emotionally Abused: Not on file    Physically Abused: Not on file    Sexually Abused: Not on file   Housing Stability:     Unable to Pay for Housing in the Last Year: Not on file    Number of [place] in the Last Year: Not on file    Unstable Housing in the Last Year: Not on file       Family Hx:  History reviewed. No pertinent family history. Review of Systems:   Review of Systems  Unable to obtain secondary to clinical condition    Physical Examination:    /89   Pulse 102   Temp 98.1 °F (36.7 °C) (Axillary)   Resp 18   Ht 5' 3\" (1.6 m)   Wt 122 lb 0.2 oz (55.3 kg)   LMP 01/01/1984   SpO2 100%   BMI 21.61 kg/m²    Physical Exam  Vitals and nursing note reviewed. Constitutional:       Appearance: Normal appearance. HENT:      Head: Normocephalic and atraumatic. Mouth/Throat:      Mouth: Mucous membranes are moist.      Pharynx: Oropharynx is clear.    Eyes:      Extraocular Movements: Extraocular movements intact. Conjunctiva/sclera: Conjunctivae normal.      Pupils: Pupils are equal, round, and reactive to light. Cardiovascular:      Rate and Rhythm: Normal rate. Rhythm irregular. Pulses: Normal pulses. Heart sounds: Normal heart sounds. Pulmonary:      Effort: Pulmonary effort is normal.      Breath sounds: Rales present. Abdominal:      General: Abdomen is flat. Bowel sounds are normal.      Palpations: Abdomen is soft. Musculoskeletal:         General: Normal range of motion. Cervical back: Normal range of motion and neck supple. Skin:     General: Skin is warm. Neurological:      General: No focal deficit present. Mental Status: She is alert and oriented to person, place, and time. Mental status is at baseline.    Psychiatric:         Mood and Affect: Mood normal.         LABS:  CBC:  Lab Results   Component Value Date    WBC 7.1 02/01/2022    RBC 4.24 02/01/2022    RBC 4.55 03/19/2012    HGB 12.8 02/01/2022    HCT 39.5 02/01/2022    MCV 93.2 02/01/2022    MCH 30.1 02/01/2022    MCHC 32.3 02/01/2022    RDW 14.5 02/01/2022    PLT 89 02/01/2022    MPV 10.1 06/13/2015     CBC with Differential:   Lab Results   Component Value Date    WBC 7.1 02/01/2022    RBC 4.24 02/01/2022    RBC 4.55 03/19/2012    HGB 12.8 02/01/2022    HCT 39.5 02/01/2022    PLT 89 02/01/2022    MCV 93.2 02/01/2022    MCH 30.1 02/01/2022    MCHC 32.3 02/01/2022    RDW 14.5 02/01/2022    BANDSPCT 10 02/01/2022    LYMPHOPCT 9.0 02/01/2022    MONOPCT 1.0 02/01/2022    EOSPCT 2.0 03/19/2012    BASOPCT 0.0 02/01/2022    MONOSABS 0.1 02/01/2022    LYMPHSABS 0.6 02/01/2022    EOSABS 0.0 02/01/2022    BASOSABS 0.0 02/01/2022     CMP:    Lab Results   Component Value Date     02/01/2022    K 4.2 02/01/2022     02/01/2022    CO2 19 02/01/2022    BUN 52 02/01/2022    CREATININE 1.07 02/01/2022    GFRAA 58.5 02/01/2022    LABGLOM 48.4 02/01/2022    GLUCOSE 384 02/01/2022    GLUCOSE 97 cardioembolic prophylaxis. Please follow-up H&H daily  Increase metoprolol 200 mg twice daily  Continue home dose of diltiazem 120 mg extended release  Obtain a TSH level.   Last TSH 0.8921-year ago  Given patient's age and severe dementia I would not recommend ischemic work-up  Please keep potassium between 4 and 5 and magnesium above 2  I will continue to follow              Electronically signed by Helga Barbosa MD on 2/1/2022 at 1:42 PM

## 2022-02-01 NOTE — CONSULTS
Palliative Care Consult Note  Patient: Aisha Rich  Gender: female  YOB: 1934  Unit/Bed: N734/I258-73  CodeStatus: DNR-CCA  Inpatient Treatment Team: Treatment Team: Attending Provider: Kerwin Brush MD; Utilization Reviewer: Nathaniel Carr, RN; Consulting Physician: Benjamin Tuttle MD; Consulting Physician: Raffi Quiroga MD; Consulting Physician: Maria Del Rosario Sequeira MD; : Amadou Birmingham RN; Consulting Physician: Dina Orellana DO; Registered Nurse: Kelsey Briggs, EMILIA; Utilization Reviewer: Myriam Roa RN  Admit Date:  1/31/2022    Chief Complaint: Mcdonald Jerry- impaired cognition    History of Presenting Illness:      Aisha Rich is a 80 y.o. female on hospital day 1 with a history of Dementia (imaging with chronic microvascular ischemia), afib (on Eliquis), Parkinson's disease w/ pseudobulbar affect, tremors, depression, anxiety, HTN, GERD who presented to [facility] 1/31/22 from nursing home with altered mental status and fever. Hospital course c/b sepsis 2/2 pneumonia, UTI, strep bacteremia, dysphagia s/p NGT for nutrition, Afib RVR requiring cardizem drip, worsening encephalopathy, JOSE. Palliative care consulted for [address], code status discussion, and family support. Pt resting in bed, opens eyes and moans to verbal stimuli. Does not follow commands, is nonverbal. Unable to assess HPI/ROS. Pt in NAD. NGT clamped at this time, pt deceiving D5 1/2 NS with 20 K. Plan to start tube feedings today. On 4L O2 per NC. Spoke with . Spouse reports that d/t COVID-19 protocols he has been unable to see patient in 2 years. He has recently been visiting patient. Reports that she is a total care, needs assistance with all ADLs. He would go to nursing home and feed her lunch every day. Denies symptoms of dysphagia with eating. Recently he noticed her oral intake has decreased and that she has lost weight, unsure of amount. Pt started pocketing food.  This past Thursday he noticed a change in mental status and told nursing home staff, which led her to recent hospitalization. Prior to admission, pt's code status was [facility]. Spouse declining hospice treatment at this time and wants code status to remain [facility]. He is ok with tube feeding at this time in setting of acute illness. Will continue to monitor progress of patient. He is unsure if he wants long term nutrition at this time and needs time to think.    - Normotensive post IVF boluses, afebrile, rate controlled afib (previously on cardizem drip). Saturating 100% on 4L O2 per NC  - On Zosyn/Vanco  Labs reviewed today: notable for hypernatremia (152-->149), improving JOSE (creatinine 1.43-->1.07), BUN 66-->52, GFR 34.6-->48.4, lactate 3.9, albumin 3-->2.3, normal LFTs, Tbili 2.2-->1.2, total protein 7.8-->6.1, no leukocytosis, H+H stable, thrombocytopenia (89)  Imaging reviewed today: 1/31/22 MRI brain with no acute processes    Review of Systems:       Review of Systems   Reason unable to perform ROS: EVELYN d/t impaired cognition. Limited assessment per spouse. Constitutional: Positive for appetite change and unexpected weight change (weight loss, unsure of amount). HENT: Positive for trouble swallowing. Psychiatric/Behavioral: Positive for confusion. All other systems reviewed and are negative. Physical Examination:       /72   Pulse 84   Temp 97.1 °F (36.2 °C) (Axillary)   Resp 20   Ht 5' 3\" (1.6 m)   Wt 122 lb 0.2 oz (55.3 kg)   LMP 01/01/1984   SpO2 100%   BMI 21.61 kg/m²    Physical Exam  Constitutional:       General: She is not in acute distress. Appearance: She is ill-appearing. HENT:      Head: Normocephalic. Cardiovascular:      Rate and Rhythm: Normal rate. Rhythm irregular. Pulses: Normal pulses. Heart sounds: Murmur heard. Pulmonary:      Effort: Pulmonary effort is normal.      Breath sounds: Normal breath sounds. No wheezing or rhonchi.    Abdominal:      General: Bowel sounds are normal. There is no distension. Palpations: Abdomen is soft. Tenderness: There is no abdominal tenderness. Musculoskeletal:      Right lower leg: No edema. Left lower leg: No edema. Skin:     General: Skin is warm and dry. Capillary Refill: Capillary refill takes less than 2 seconds. Neurological:      Comments: Nonverbal, not following commands, EVELYN gait   Psychiatric:         Mood and Affect: Mood is not anxious. Behavior: Behavior is not agitated. Cognition and Memory: Cognition is impaired. Memory is impaired. Allergies:       Allergies   Allergen Reactions    Donepezil Other (See Comments)    Codeine      Other reaction(s): GI Upset       Medications:      Current Facility-Administered Medications   Medication Dose Route Frequency Provider Last Rate Last Admin    insulin glargine (LANTUS) injection vial 10 Units  10 Units SubCUTAneous Nightly Rojas Morales MD        dilTIAZem 125 mg in sodium chloride 0.9 % 125 mL infusion  5-15 mg/hr IntraVENous Continuous Rojas Morales MD   Stopped at 01/31/22 2214    glucose (GLUTOSE) 40 % oral gel 15 g  15 g Oral PRN Rojas Morales MD        dextrose 50 % IV solution  12.5 g IntraVENous PRN Rojas Morales MD        glucagon (rDNA) injection 1 mg  1 mg IntraMUSCular PRN Rojas Morales MD        dextrose 5 % solution  100 mL/hr IntraVENous PRN Rojas Morales MD        sodium chloride flush 0.9 % injection 5-40 mL  5-40 mL IntraVENous 2 times per Rojas Morales MD   10 mL at 01/31/22 2115    sodium chloride flush 0.9 % injection 5-40 mL  5-40 mL IntraVENous PRN Rojas Morales MD        0.9 % sodium chloride infusion  25 mL IntraVENous PRN Rojas Morales MD        ondansetron (ZOFRAN-ODT) disintegrating tablet 4 mg  4 mg Oral Q8H PRN Rojas Morales MD        Or    ondansetron (ZOFRAN) injection 4 mg  4 mg IntraVENous Q6H PRN Rojas Morales MD        polyethylene glycol (GLYCOLAX) packet 17 g  17 g Oral Daily PRN Dion Jyoti Zacarias MD        acetaminophen (TYLENOL) tablet 650 mg  650 mg Oral Q6H PRN Reddy Johnson MD        Or    acetaminophen (TYLENOL) suppository 650 mg  650 mg Rectal Q6H PRN Reddy Johnson MD        sodium chloride flush 0.9 % injection 5-40 mL  5-40 mL IntraVENous 2 times per day Reddy Johnson MD        sodium chloride flush 0.9 % injection 5-40 mL  5-40 mL IntraVENous PRN Reddy Johnson MD        0.9 % sodium chloride infusion  25 mL IntraVENous PRN Reddy Johnson MD        dextrose 5 % and 0.45 % NaCl with KCl 20 mEq infusion   IntraVENous Continuous Reddy Johnson  mL/hr at 02/01/22 0821 New Bag at 02/01/22 0821    magnesium sulfate 2000 mg in 50 mL IVPB premix  2,000 mg IntraVENous PRN Reddy Johnson MD        potassium chloride 10 mEq/100 mL IVPB (Peripheral Line)  10 mEq IntraVENous PRN Reddy Johnson MD        insulin lispro (HUMALOG) injection vial 0-12 Units  0-12 Units SubCUTAneous Q6H Reddy Johnson MD   8 Units at 02/01/22 0604    piperacillin-tazobactam (ZOSYN) 3,375 mg in dextrose 5 % 50 mL IVPB extended infusion (mini-bag)  3,375 mg IntraVENous Q12H Reddy Johnson MD   Stopped at 02/01/22 0109    vancomycin (VANCOCIN) intermittent dosing (placeholder)   Other RX Placeholder Reddy Johnson MD        vancomycin (VANCOCIN) 500 mg in dextrose 5 % 100 mL IVPB (mini-bag)  500 mg IntraVENous Q24H Reddy Johnson MD        apixaban (ELIQUIS) tablet 2.5 mg  2.5 mg Oral BID Abad Roach MD        metoprolol tartrate (LOPRESSOR) tablet 50 mg  50 mg Oral BID Abad Roach MD   50 mg at 01/31/22 2115    dilTIAZem (CARDIZEM) tablet 60 mg  60 mg Oral 4 times per day Abad Roach MD   60 mg at 02/01/22 0250       History:       PMHx:  Past Medical History:   Diagnosis Date    Anxiety     Atrial fibrillation (HCC)     Atrial fibrillation (HCC)     Atrial fibrillation (HCC)     Chronic back pain     Dementia (HCC)     Depression     Encephalopathy     Essential tremor     Hypertension     Parkinsons disease ([facility] [address]     Pseudobulbar affect     Trigeminal neuralgia        PSHx:  Past Surgical History:   Procedure Laterality Date    COLONOSCOPY  11/18/09    DR Emilie Olszewski    HYSTERECTOMY      SMALL INTESTINE SURGERY      PARTIAL    UPPER GASTROINTESTINAL ENDOSCOPY  5/20/09    DR DONALD       Social Hx:  Social History     Socioeconomic History    Marital status:      Spouse name: None    Number of children: None    Years of education: None    Highest education level: None   Occupational History    None   Tobacco Use    Smoking status: Never Smoker    Smokeless tobacco: Never Used   Vaping Use    Vaping Use: Never used   Substance and Sexual Activity    Alcohol use: No    Drug use: No    Sexual activity: Not Currently   Other Topics Concern    None   Social History Narrative    None     Social Determinants of Health     Financial Resource Strain:     Difficulty of Paying Living Expenses: Not on file   Food Insecurity:     Worried About Running Out of Food in the Last Year: Not on file    Alissa of Food in the Last Year: Not on file   Transportation Needs:     Lack of Transportation (Medical): Not on file    Lack of Transportation (Non-Medical):  Not on file   Physical Activity:     Days of Exercise per Week: Not on file    Minutes of Exercise per Session: Not on file   Stress:     Feeling of Stress : Not on file   Social Connections:     Frequency of Communication with Friends and Family: Not on file    Frequency of Social Gatherings with Friends and Family: Not on file    Attends [employer] Services: Not on file    Active Member of Clubs or Organizations: Not on file    Attends Club or Organization Meetings: Not on file    Marital Status: Not on file   Intimate Partner Violence:     Fear of Current or Ex-Partner: Not on file    Emotionally Abused: Not on file    Physically Abused: Not on file    Sexually Abused: Not on file   Housing Stability:  Unable to Pay for Housing in the Last Year: Not on file    Number of Places Lived in the Last Year: Not on file    Unstable Housing in the Last Year: Not on file       Family Hx:  History reviewed. No pertinent family history.     LABS: Reviewed     CBC:  Lab Results   Component Value Date    WBC 7.1 02/01/2022    RBC 4.24 02/01/2022    RBC 4.55 03/19/2012    HGB 12.8 02/01/2022    HCT 39.5 02/01/2022    MCV 93.2 02/01/2022    MCH 30.1 02/01/2022    MCHC 32.3 02/01/2022    RDW 14.5 02/01/2022    PLT 89 02/01/2022    MPV 10.1 06/13/2015     CBC with Differential:   Lab Results   Component Value Date    WBC 7.1 02/01/2022    RBC 4.24 02/01/2022    RBC 4.55 03/19/2012    HGB 12.8 02/01/2022    HCT 39.5 02/01/2022    PLT 89 02/01/2022    MCV 93.2 02/01/2022    MCH 30.1 02/01/2022    MCHC 32.3 02/01/2022    RDW 14.5 02/01/2022    BANDSPCT 10 02/01/2022    LYMPHOPCT 9.0 02/01/2022    MONOPCT 1.0 02/01/2022    EOSPCT 2.0 03/19/2012    BASOPCT 0.0 02/01/2022    MONOSABS 0.1 02/01/2022    LYMPHSABS 0.6 02/01/2022    EOSABS 0.0 02/01/2022    BASOSABS 0.0 02/01/2022     CMP:    Lab Results   Component Value Date     02/01/2022    K 4.2 02/01/2022     02/01/2022    CO2 19 02/01/2022    BUN 52 02/01/2022    CREATININE 1.07 02/01/2022    GFRAA 58.5 02/01/2022    LABGLOM 48.4 02/01/2022    GLUCOSE 384 02/01/2022    GLUCOSE 97 03/19/2012    PROT 6.1 02/01/2022    LABALBU 2.3 02/01/2022    LABALBU 4.2 03/19/2012    CALCIUM 9.4 02/01/2022    BILITOT 1.2 02/01/2022    ALKPHOS 43 02/01/2022    AST 27 02/01/2022    ALT 12 02/01/2022     BMP:    Lab Results   Component Value Date     02/01/2022    K 4.2 02/01/2022     02/01/2022    CO2 19 02/01/2022    BUN 52 02/01/2022    LABALBU 2.3 02/01/2022    LABALBU 4.2 03/19/2012    CREATININE 1.07 02/01/2022    CALCIUM 9.4 02/01/2022    GFRAA 58.5 02/01/2022    LABGLOM 48.4 02/01/2022    GLUCOSE 384 02/01/2022    GLUCOSE 97 03/19/2012     TSH:   Lab Results Component Value Date    TSH 0.892 03/06/2020     Vitamin B12 and Folate: No components found for: FOLIC,  V64  Urinalysis:   Lab Results   Component Value Date    NITRU POSITIVE 01/31/2022    WBCUA 6-9 01/31/2022    BACTERIA FEW 01/31/2022    RBCUA 3-5 01/31/2022    BLOODU MODERATE 01/31/2022    SPECGRAV 1.028 01/31/2022    GLUCOSEU Negative 01/31/2022    GLUCOSEU NEG 03/19/2012           FUNCTIONAL ADL´S:     Independent: [  ] Eating, [   ] Dressing, [   ] Transferring, [   ] Milbert Talib, [   ] Chrystal Sandifer, [   ] Continence  Dependent   : [ x ] Eating, [  x ] Dressing, [ x  ] Transferring, [ x  ] Toileting, [ x  ] Bathing, [x   ] Continence  W. assistant : [  ] Eating, [   ] Dressing, [   ] Transferring, [   ] Milbert Talib, [   ] Chrystal Sandifer, [   ] Continence    Radiology: Reviewed      XR CHEST PORTABLE    Result Date: 1/31/2022  EXAMINATION:  CHEST RADIOGRAPH (PORTABLE SINGLE VIEW AP) Exam Date/Time:  1/31/2022 8:49 AM Clinical History:   sob Comparison:  2/14/2021  RESULT: Lines, tubes, and devices:  None. Lungs and pleura:  Consolidative opacity in the right middle and lower lobes, concerning for pneumonia. Bibasilar atelectasis. Trace right pleural effusion. No left pleural effusion. No pneumothorax. Cardiomediastinal silhouette:  Stable cardiomediastinal silhouette. Atherosclerotic calcification of the aortic arch. Other: No acute osseous process. Right middle and lower lobe pneumonia. XR CHEST ABDOMEN NG PLACEMENT    Result Date: 1/31/2022  ABDOMEN, 1 VIEW CLINICAL INFORMATION: Feeding tube placement. DATE: 1/31/2022 TECHNIQUE: Supine abdomen 2 image(s)     RESULT/IMPRESSION: There is a feeding tube with the side-port overlying the level of the left mainstem bronchus and the tip coiling superiorly to the level of the fourth rib. The feeding tube should be readjusted. Bibasilar atelectasis. Small right pleural effusion. No pneumothorax. . There are no dilated loops of bowel.       XR CHEST ABDOMEN NG PLACEMENT    Result Date: 1/31/2022  ABDOMEN, 1 VIEW CLINICAL INFORMATION: Feeding tube placement. DATE: 1/31/2022 TECHNIQUE: Supine abdomen 1 image(s)     RESULT/IMPRESSION: There is a feeding tube with the side-port at the level of T11 and the tip at the level of T8. The feeding tube should be readjusted. Bibasilar atelectasis. Moderate right pleural effusions, unchanged. There are no dilated loops of bowel. COMMUNICATION:  Communicated with: Melanie Anthony RN 1 W. nurse on 1/31/2022 at 5:01 PM.       MRI BRAIN WO CONTRAST    Result Date: 1/31/2022  MRI BRAIN WO CONTRAST HISTORY:  Altered mental status. COMPARISON: CT head 2/14/2021. MRI brain 4/2/2015 TECHNIQUE: Routine noncontrast brain MRI protocol including diffusion and gradient echo images. RESULT: MR BRAIN: Acute Change: There is no evidence of restricted diffusion to suggest an acute infarct. Hemorrhage:    No evidence of prior parenchymal hemorrhage on the gradient echo images. Mass Effect / Mass Lesion: No evidence of an intracranial mass or extra-axial fluid collection. No significant mass effect. Chronic Change:  Scattered patchy and confluent areas of increased T2 and FLAIR signal are present in the supratentorial white matter which is nonspecific but likely represents chronic microvascular ischemia. Parenchyma: There is moderate generalized parenchymal volume loss. Ventricles:     Ventriculomegaly corresponds to the degree of parenchymal volume loss. Skull Base:    Hypothalamic and pituitary region are grossly normal.   Craniocervical junction is normal. No significant marrow replacement process. Vasculature:    Major intracranial arterial structures, and dural venous sinuses show typical flow void, suggesting patency by spin echo criteria. Other:  Visualized paranasal sinuses grossly clear. Bilateral lens replacement surgery. Trace fluid bilaterally in the mastoid air cells. Extracranial soft tissues grossly unremarkable.      No acute intracranial process. Assessment and plan:  1. Palliative Care Encounter  Explained the role of palliative care in treating patients. Discussed symptom management related to chronic diseases/conditions. Provided emotional support and active listening. Patient's spouse understands and is agreeable to current plan. - Will follow with patient as outpatient if spouse does not choose Hospice Care. 2. Encounter for Hospice Care Discussion  > Pt eligible for hospice care d/t parkinson's and dementia. Pt with progressive functional and cognitive decline, dysphagia and inability to maintain nutritional support, with visible weight loss, and hypoalbuminemia, primarily bed bound, in setting of acute encephalopathy likely 2/2 sepsis. > Bedbound with PPS of 10%  - Pt is hospice appropriate if spouse wants patient to be treated symptomatically, however, not consistent with current goal of care     -Advance Care Planning  Discussed goals of care with patient's spouse. Explained in extensive detail nuances between full code, DNR CCA and DNR CC. Patient has made the decision to be [facility], does not want ACP documents at this time.      -Goals of Care Discussion:  Disease process and goals of treatment were discussed in basic terms with spouse. Carri's goal is to treat underlying infections and have pt return to baseline so she can return to nursing home. Explained poor prognosis of case. Spouse is ok with tube feeding at this time in setting of acute illness. Explained high likelihood of patient needing long term feeding tube for nutrition upon discharge from hospital. He is unsure if he wants long term nutrition via peg tube and needs time to think. Will follow up tomorrow with decision. Explained hospice eligibility, for which he is declining at this time. We discussed the palliative care philosophy in light of those goals. We discussed all care options contingent on treatment response and QOL.  Much active listening, presence, and emotional support were given. Palliative Care signing off. Will follow as outpatient if patient and spouse agree. Don't hesitate to call back with any questions/concerns. Patient is being treated for multiple medical conditions this admission includin. Sepsis 2/2 pneumonia, UTI, strep bacteremia  2. Dysphagia  3. Afib RVR  4. Dementia with worsening encephalopathy  5. JOSE  6. T2 DM    Electronically signed by OLIVERIO Hood CNP on 2022 at 9:41 AM    Will discuss with collaborating physician Dr. Beto Paul.

## 2022-02-01 NOTE — PROGRESS NOTES
[facility] Occupational Therapy      Date: 2022  Patient Name: Roberto Camilo        MRN: [id number]  Account: [de-identified]   : 1934  (80 y.o.)  Room: [place] [address]    Chart reviewed, attempted OT at [phone] for eval. Patient not seen 2° to: Other: Pt lethargic and unable to participate, minimally responsive. RN aware; will continue to monitor. Spoke to RN Miley Bowman RN aware. Will attempt again when able.     Electronically signed by SANYA Evans on 2022 at 2:27 PM

## 2022-02-01 NOTE — PROGRESS NOTES
Hospitalist Progress Note      PCP: Karen Gomez MD    Date of Admission: 1/31/2022    Chief Complaint:    Chief Complaint   Patient presents with    Fever     Subjective:  Patient is not verbally responsive; unable to complete full 12 point ROS     Medications:  Reviewed    Infusion Medications    dilTIAZem (CARDIZEM) 125 mg in dextrose 5% 125 mL infusion Stopped (01/31/22 0914)    dextrose      sodium chloride      sodium chloride      dextrose 5% and 0.45% NaCl with KCl 20 mEq 150 mL/hr at 02/01/22 0821     Scheduled Medications    insulin glargine  10 Units SubCUTAneous Nightly    sodium chloride flush  5-40 mL IntraVENous 2 times per day    sodium chloride flush  5-40 mL IntraVENous 2 times per day    insulin lispro  0-12 Units SubCUTAneous Q6H    piperacillin-tazobactam  3,375 mg IntraVENous Q12H    vancomycin (VANCOCIN) intermittent dosing (placeholder)   Other RX Placeholder    vancomycin  500 mg IntraVENous Q24H    apixaban  2.5 mg Oral BID    metoprolol tartrate  50 mg Oral BID    dilTIAZem  60 mg Oral 4 times per day     PRN Meds: glucose, dextrose, glucagon (rDNA), dextrose, sodium chloride flush, sodium chloride, ondansetron **OR** ondansetron, polyethylene glycol, acetaminophen **OR** acetaminophen, sodium chloride flush, sodium chloride, magnesium sulfate, potassium chloride      Intake/Output Summary (Last 24 hours) at 2/1/2022 0925  Last data filed at 2/1/2022 0700  Gross per 24 hour   Intake 4057.83 ml   Output --   Net 4057.83 ml     Exam:    /72   Pulse 84   Temp 97.1 °F (36.2 °C) (Axillary)   Resp 20   Ht 5' 3\" (1.6 m)   Wt 122 lb 0.2 oz (55.3 kg)   LMP 01/01/1984   SpO2 100%   BMI 21.61 kg/m²     General appearance: NAD but not responding verbally  Skin: No rashes or lesions on exposed skin  HEENT: Head: Normal, normocephalic, atraumatic. [place]    Neck: supple, symmetrical, trachea midline  Lungs: clear to auscultation bilaterally  Heart: Irregularly irregular  Abdomen: soft, non-tender; bowel sounds normal; no masses,  no organomegaly  Extremities: extremities normal, atraumatic, no cyanosis or edema  Neurologic: Awake with eyes open but not responding to verbal or tactile stimuli     Labs:   Recent Labs     01/31/22  0845 01/31/22  0918 02/01/22  0511   WBC 4.8  --  7.1   HGB 16.0 15.7 12.8   HCT 48.3*  --  39.5   *  --  89*     Recent Labs     01/31/22  0845 01/31/22  0918 01/31/22  1211 02/01/22  0511   *  --  152* 149*   K 4.2  --  3.4 4.2   *  --  116* 114*   CO2 22  --  20 19*   BUN 74*  --  66* 52*   CREATININE 1.76* 1.9* 1.43* 1.07*   CALCIUM 10.7*  --  9.0 9.4     Recent Labs     01/31/22  0845 02/01/22  0511   AST 33 27   ALT 12 12   BILITOT 2.2* 1.2*   ALKPHOS 51 43     Recent Labs     01/31/22  0900   INR 1.3     Recent Labs     01/31/22  0845   TROPONINI 0.075*       Urinalysis:      Lab Results   Component Value Date    NITRU POSITIVE 01/31/2022    WBCUA 6-9 01/31/2022    BACTERIA FEW 01/31/2022    RBCUA 3-5 01/31/2022    BLOODU MODERATE 01/31/2022    SPECGRAV 1.028 01/31/2022    GLUCOSEU Negative 01/31/2022    GLUCOSEU NEG 03/19/2012       Radiology:  MRI BRAIN WO CONTRAST   Final Result      No acute intracranial process. XR CHEST ABDOMEN NG PLACEMENT   Final Result   RESULT/IMPRESSION:       There is a feeding tube with the side-port at the level of T11 and the tip at the level of T8. The feeding tube should be readjusted. Bibasilar atelectasis. Moderate right pleural effusions, unchanged. There are no dilated loops of bowel. COMMUNICATION:  Communicated with: Soha Vasquez, RN 1 W. nurse on 1/31/2022 at 5:01 PM.                           XR CHEST ABDOMEN NG PLACEMENT   Final Result   RESULT/IMPRESSION:       There is a feeding tube with the side-port overlying the level of the left mainstem bronchus and the tip coiling superiorly to the level of the fourth rib. The feeding tube should be readjusted. Bibasilar atelectasis.  Small right pleural effusion. No    pneumothorax. .      There are no dilated loops of bowel. XR CHEST PORTABLE   Final Result      Right middle and lower lobe pneumonia. IR PLACE NG TUBE BY DR Albertina Schilder    (Results Pending)     Assessment/Plan:    1.  Sepsis secondary to right sided pneumonia, UTI and strep bacteremia POA:  Continue with vanc/zosyn for now and consult pulmonology for their opinion; will consult ID today given the bacteremia  2. Dysphagia:  Patient currently is not in a state where she can swallow; NG in place; dietician for TF management; continue D5 1/2 NS  3. A Fib with RVR:  Improving with meds via NG; change lovenox to eliquis today  4. Dementia with worsening encephalopathy:  Probable metabolic encephalopathy; monitor; per  she would not want a PEG if necessary; encephalopathy present since Friday  5. JOSE; Hypernatremia:  IMproving  6. DMII:  Q6H SSI and adjust as needed; add lantus today  7. Functional Status: Fall precautions. Up with assistance. PT OT  8. Diet: NPO; start TF when able  9. DVT ppx: Eliquis   10. Disposition: Dependent on hospital course. Will discharge once medically stable.  on board for discharge [address] [employer]    Diagnosis Date Noted    Fever and chills [R50.9] 01/31/2022     Additional work up or/and treatment plan may be added today or then after based on clinical progression. I am managing a portion of pt care. Some medical issues are handled by other specialists. Additional work up and treatment should be done in out pt setting by pt PCP and other out pt providers. In addition to examining and evaluating pt, I spent additional time explaining care, normal and abnormal findings, and treatment plan. All of pt questions were answered. Counseling, diet and education were  provided. Case will be discussed with nursing staff when appropriate. Family will be updated if and when appropriate.       Diet: Diet NPO    Code Status: DNR-CCA    PT/OT Eval     Electronically signed by Dionisio Spears MD on 2/1/2022 at 9:25 AM

## 2022-02-01 NOTE — PROGRESS NOTES
Pharmacy Vancomycin Consult     Vancomycin Day: 2  Current Dosin mg every 24 hours    Temp 24hr max:  98.1    Recent Labs     22  0845 22  0918 22  1211 22  0511   BUN 74*   < > 66* 52*   CREATININE 1.76*   < > 1.43* 1.07*   WBC 4.8  --   --  7.1    < > = values in this interval not displayed. Intake/Output Summary (Last 24 hours) at 2022 1307  Last data filed at 2022 0700  Gross per 24 hour   Intake 4057.83 ml   Output --   Net 4057.83 ml     Cultures  Recent Labs     22  0905 22  0857   BC  --  Gram stain aerobic bottle  Gram positive cocci in chains and pairs- resembling Strep  2 out of 2 blood cultures  Further results to follow  *   BLOODCULT2  --  Gram stain aerobic bottle  Gram positive cocci in chains - resembling Strep  2 out of 2 blood cultures  Further results to follow  *   COVID19 Not Detected  --      Height: 5' 3\" (160 cm), Weight: 122 lb 0.2 oz (55.3 kg), Body mass index is 21.61 kg/m². Estimated Creatinine Clearance: 30 mL/min (A) (based on SCr of 1.07 mg/dL (H)). .      Assessment/Plan:  Current dosing is predicted to be subtherapeutic at  mg/L.hr, trough 10.1 mg/L  Patient has only received one dose thus far, so will defer any dose modification until after tomorrow AM labs result  InsightRx updated  Timing of future levels may be adjusted based on culture results, renal function, and clinical response.     Thank you,  Cameron Mclean, PharmD  PGY-1 Pharmacy Resident  2022 1:07 PM

## 2022-02-01 NOTE — PROGRESS NOTES
[facility]   Facility/Department: [facility]  Speech Language Pathology    Osei Has  1934  [id number]    Date: 2/1/2022      Speech Therapy attempted to see Osei Has on this date for a/an:    Clinical Bedside Swallow Evaluation    Pt was unable to be seen due to:   Nursing deferred: patient is not alert enough for participation. Will re-attempt as the patient is able. Discussed with RN Amanda Cortes.          Electronically signed by KRISSY Riley on 2/1/22 at 8:44 AM EST

## 2022-02-01 NOTE — PLAN OF CARE
Nutrition Problem #1: Swallowing difficulty  Intervention: Food and/or Nutrient Delivery: Start Tube Feeding (Start Diabetic formula @20ml/hr x 24 hrs and increase as tolerated to goal @40ml/hr with 200ml water flush q 4 hrs)  Nutritional Goals: Pt able to tolerate initiation of EN

## 2022-02-01 NOTE — FLOWSHEET NOTE
Patient's NGT checked for residual. 5ML residual noted. Patient resting in bed. 's . Pt. Given IV lopressor 5 mg per PRN order. HR down to 90's. B/P stable.

## 2022-02-01 NOTE — PROGRESS NOTES
Comprehensive Nutrition Assessment    Type and Reason for Visit:  Initial (Tube feed order and manage)    Nutrition Recommendations/Plan:   · Begin TF via NG of Diabetic formula (Glucerna 1.5)   · Start @20ml/hr x 24 hrs and increase as tolerated to goal @40ml/hr with 200ml water flush q 4 hrs  · Monitor serum sodium and adjust water flushes accordingly     Nutrition Assessment:  Pt at high risk for malnutrition d/t low wt with advanced age, poor intakes pta, and current inability to take PO. UTD if wt loss is present d/t insufficient bed scale wt hx. Will start TF via NG and monitor tolerance and serum sodium levels. Malnutrition Assessment:  Malnutrition Status:  Insufficient data    Context:  Chronic Illness     Findings of the 6 clinical characteristics of malnutrition:  Energy Intake:  Mild decrease in energy intake (Comment) (reported from  pta)  Weight Loss:  Unable to assess     Body Fat Loss:  Unable to assess     Muscle Mass Loss:  Unable to assess    Fluid Accumulation:  Unable to assess     Strength:  Not Performed    Estimated Daily Nutrient Needs:  Energy (kcal):  1430-1595kcal (26-29kcal/kg); Weight Used for Energy Requirements:  Current (55kg)     Protein (g):  82g (1.5g/kg); Weight Used for Protein Requirements:  Current (55kg)        Fluid (ml/day):  ~1600ml; Method Used for Fluid Requirements:  1 ml/kcal      Nutrition Related Findings:  Pt presents from NH with AMS/fever/PNA. Pmhx: dementia, depression, HTN, Parkinsons, DM2(?). Last BM pta. Labs (2/1): Ko=063; BUN/Cr=52/1.07; Gluc=>300. NaCl in D5 @150ml/hr. Per  pt does not eat at [facility] without help. NG placed. PEG placement not within POC. Pt unable to complete BSE, too lethargic.       Wounds:  None       Current Nutrition Therapies:    Diet NPO  Current Tube Feeding (TF) Orders:  · Feeding Route: Nasogastric  · Formula: Diabetic (Glucerna 1.5)  · Schedule: Continuous  · Water Flushes: 200ml q 4 hrs (1200ml)  · Current TF & Flush Orders Provides: @20ml/cp=516ownk, 39g pro, 1564ml  · Goal TF & Flush Orders Provides: @40ml/wp=8590ktue, 79g pro, 1928 ml    Anthropometric Measures:  · Height: 5' 3\" (160 cm)  · Current Body Weight: 122 lb 0.2 oz (55.3 kg) (bed 1/31)   · Admission Body Weight: 120 lb (54.4 kg) (1/31 stated)    · Usual Body Weight: 120 lb (54.4 kg) (12/23/21 [facility]; 135# 2/14/21 unknown source)     · Ideal Body Weight: 115 lbs; % Ideal Body Weight   >100%  · BMI: 21.6  · BMI Categories: Underweight (BMI less than 22) age over 72       Nutrition Diagnosis:   · Swallowing difficulty related to cognitive or neurological impairment as evidenced by NPO or clear liquid status due to medical condition,nutrition support - enteral nutrition  · Underweight related to inadequate protein-energy intake as evidenced by poor intake prior to admission,BMI    Nutrition Interventions:   Food and/or Nutrient Delivery:  Start Tube Feeding  Nutrition Education/Counseling:  No recommendation at this time   Coordination of Nutrition Care:  Continue to monitor while inpatient    Goals:  Pt able to tolerate initiation of EN       Nutrition Monitoring and Evaluation:   Behavioral-Environmental Outcomes:  None Identified   Food/Nutrient Intake Outcomes:  Enteral Nutrition Intake/Tolerance,IVF Intake  Physical Signs/Symptoms Outcomes:  Biochemical Data,Weight     Discharge Planning:     Too soon to determine     Electronically signed by Maggy Salinas MS, RD, LD on 2/1/22 at 9:42 AM EST

## 2022-02-01 NOTE — PROGRESS NOTES
Physical Therapy   Facility/Department: [facility] MED SURG [id number]    NAME: Carlos Zamora    : 1934 (80 y.o.)  MRN: [id number]    Account: [de-identified]  Gender: female    PT evaluation and treatment orders received. Chart reviewed. PT eval attempted. Hold PT eval: pt not alert enough to participate in PT eval . Discussed with Jose Izquierdo RN. Will attempt PT evaluation again at earliest convenience.       Electronically signed by Antonio Hernandez PT on 22 at 2:16 PM EST

## 2022-02-02 NOTE — SIGNIFICANT EVENT
During my rounds I noticed the patients breathing being more shallow than yesterday and less responsive. Ordered stat ABG. Respiratory therapist notified me of acute hypercapnic respiratory failure and that the patient was more hypoxic now. Ordered Bipap and updated . I then later received a message that the nurses wanted to speak to me; they informed me the patients heart rate was in the 30s. Requested a stat EKG and that I would come to the room immediately. Upon my arrival patient was on bipap; RT was feeling a femoral pulse and the patient was hypotensive. Asked RT if she felt a pulse and she said yes; requested a rapid response. The patient lost her pulses, stopped breathing and  shortly after before we could administer atropine. Called the patients  and informed him of her passing; time of death was 11:20am.  Over 45 minutes of critical care time was spent on the care of this patient.       Fredo Kraft

## 2022-02-02 NOTE — PROGRESS NOTES
[facility] Occupational Therapy      Date: 2022  Patient Name: Jodie Jolley        MRN: [id number]  Account: [de-identified]   : 1934  (80 y.o.)  [facility]    Chart reviewed, attempted OT at 36 for eval. Patient not seen 2° to: Other: lethargy    Spoke to Kasi Smalls RN aware. Will attempt again when able.     Electronically signed by Valentin Au OT on 2022 at 8:20 AM

## 2022-02-02 NOTE — DISCHARGE SUMMARY
Hospital Medicine Discharge Summary    Fracisco Pollock  :  1934  MRN:  [id number]    Admit date:  2022  Discharge date:  2022    Admitting Physician:  Shanae Rodas MD  Primary Care Physician:  Shelly Jewell MD    This patient was seen during a global health during the COVID-19 pandemic and its resultant significant effects on the delivery of emergency care. This includes but is not limited to the following: significant ED boarding, hospital overcrowding,  limited bed availability (floor and especially ICU), limited resources (personnel, supplies, testing, services etc). While every and all efforts are made to delivery the highest quality care in a prompt way there may or may not be significant delays in care as a result. Discharge Diagnoses:    Acute hypoxic and hypercapnic respiratory failure; sepsis; bacteremia    Chief Complaint   Patient presents with   Rosalene Jacob Fever     Hospital Course:   Patient with advanced dementia presented with A Fib with RVR, septic encephalopathy, acute hypoxic respiratory failure and was found to have strep bacteremia. The patient developed acute hypercapnic respiratory failure and became bradycardic before passing away.        Signed:  Shanae Rodas MD  2022, 3:27 PM  ----------------------------------------------------------------------------------------------------------------------    [facility]

## 2022-02-02 NOTE — PROGRESS NOTES
Pharmacy Vancomycin Consult     Vancomycin Day: 3  Current Dosin mg every 24 hours    Temp 24hr max:  97.5    Recent Labs     22  0511 22  0600   BUN 52* 42*   CREATININE 1.07* 0.81   WBC 7.1 21.4*       Intake/Output Summary (Last 24 hours) at 2022 6685  Last data filed at 2022 6181  Gross per 24 hour   Intake 3680.65 ml   Output --   Net 3680.65 ml     Cultures  Recent Labs     22  0905 22  0857   BC  --  Gram stain aerobic bottle  Gram positive cocci in chains and pairs- resembling Strep  2 out of 2 blood cultures  Further results to follow  *  POSITIVE for  ID and sensitivity to follow     BLOODCULT2  --  Gram stain aerobic bottle  Gram positive cocci in chains - resembling Strep  2 out of 2 blood cultures  Further results to follow  *  POSITIVE for  ID and sensitivity to follow     ORG  --  Streptococcus species*  Streptococcus species*   COVID19 Not Detected  --      Height: 5' 3\" (160 cm), Weight: 122 lb 0.2 oz (55.3 kg), Body mass index is 21.61 kg/m². Estimated Creatinine Clearance: 40 mL/min (based on SCr of 0.81 mg/dL). .    Trough:  Recent Labs     22  0600   VANCORANDOM <4.0*      Assessment/Plan:  Current dose is subtherapeutic given level of < 4 mg/L  Patient's renal function has significantly improved over the past 48 hours  Will modify dose to vancomycin 750 mg every 18 hours  Repeat level ordered for  @ 0600  InsightRx updated, which predicts the following:   mg/L*hr   Trough concentration at steady state 14 mg/L   Probability AUC is >400 mg/L*hr 60%   Probability trough >20 mg/L 13%   Probability of nephrotoxicity 9%   Timing of future levels may be adjusted based on culture results, renal function, and clinical response.     Thank you,  Liam Guzmán, PharmD  PGY-1 Pharmacy Resident  2022 8:42 AM

## 2022-02-02 NOTE — CODE DOCUMENTATION
Physician at bedside, respiratory on bedside. Pt on bipap support. No pulse at this time. Attempt ekg per physician request. Iv fluids stopped. pt spouse updated.

## 2022-02-03 LAB
BLOOD CULTURE, ROUTINE: ABNORMAL
BLOOD CULTURE, ROUTINE: ABNORMAL
CULTURE, BLOOD 2: ABNORMAL
CULTURE, BLOOD 2: ABNORMAL
ORGANISM: ABNORMAL
ORGANISM: ABNORMAL

## 2022-02-07 LAB — BLOOD CULTURE, ROUTINE: NORMAL
